# Patient Record
Sex: MALE | Race: WHITE | NOT HISPANIC OR LATINO | Employment: FULL TIME | URBAN - METROPOLITAN AREA
[De-identification: names, ages, dates, MRNs, and addresses within clinical notes are randomized per-mention and may not be internally consistent; named-entity substitution may affect disease eponyms.]

---

## 2017-06-02 ENCOUNTER — HOSPITAL ENCOUNTER (OUTPATIENT)
Dept: RADIOLOGY | Age: 42
Discharge: HOME | End: 2017-06-02
Payer: COMMERCIAL

## 2017-06-02 DIAGNOSIS — C73 THYROID CANCER (HCC): ICD-10-CM

## 2017-06-27 ENCOUNTER — APPOINTMENT (OUTPATIENT)
Dept: LAB | Facility: CLINIC | Age: 42
End: 2017-06-27
Payer: COMMERCIAL

## 2017-06-27 ENCOUNTER — TRANSCRIBE ORDERS (OUTPATIENT)
Dept: LAB | Facility: CLINIC | Age: 42
End: 2017-06-27

## 2017-06-27 DIAGNOSIS — C73 MALIGNANT NEOPLASM OF THYROID GLAND (HCC): ICD-10-CM

## 2017-06-27 DIAGNOSIS — E83.51 HYPOCALCEMIA: ICD-10-CM

## 2017-06-27 DIAGNOSIS — E89.0 POSTSURGICAL HYPOTHYROIDISM: ICD-10-CM

## 2017-06-27 DIAGNOSIS — E11.9 DIABETES MELLITUS WITHOUT COMPLICATION (HCC): ICD-10-CM

## 2017-06-27 DIAGNOSIS — Z79.899 ENCOUNTER FOR LONG-TERM (CURRENT) USE OF OTHER MEDICATIONS: ICD-10-CM

## 2017-06-27 DIAGNOSIS — Z79.4 ENCOUNTER FOR LONG-TERM (CURRENT) USE OF INSULIN (HCC): ICD-10-CM

## 2017-06-27 DIAGNOSIS — E66.09 EXOGENOUS OBESITY: ICD-10-CM

## 2017-06-27 DIAGNOSIS — E11.9 DIABETES MELLITUS WITHOUT COMPLICATION (HCC): Primary | ICD-10-CM

## 2017-06-27 LAB
ALBUMIN SERPL BCP-MCNC: 4 G/DL (ref 3.5–5)
ALP SERPL-CCNC: 58 U/L (ref 46–116)
ALT SERPL W P-5'-P-CCNC: 70 U/L (ref 12–78)
ANION GAP SERPL CALCULATED.3IONS-SCNC: 6 MMOL/L (ref 4–13)
AST SERPL W P-5'-P-CCNC: 33 U/L (ref 5–45)
BILIRUB SERPL-MCNC: 0.9 MG/DL (ref 0.2–1)
BUN SERPL-MCNC: 16 MG/DL (ref 5–25)
CALCIUM SERPL-MCNC: 8.7 MG/DL (ref 8.3–10.1)
CHLORIDE SERPL-SCNC: 102 MMOL/L (ref 100–108)
CO2 SERPL-SCNC: 31 MMOL/L (ref 21–32)
CREAT SERPL-MCNC: 1.08 MG/DL (ref 0.6–1.3)
EST. AVERAGE GLUCOSE BLD GHB EST-MCNC: 154 MG/DL
GFR SERPL CREATININE-BSD FRML MDRD: >60 ML/MIN/1.73SQ M
GLUCOSE P FAST SERPL-MCNC: 154 MG/DL (ref 65–99)
HBA1C MFR BLD: 7 % (ref 4.2–6.3)
POTASSIUM SERPL-SCNC: 3.9 MMOL/L (ref 3.5–5.3)
PROT SERPL-MCNC: 7.4 G/DL (ref 6.4–8.2)
SODIUM SERPL-SCNC: 139 MMOL/L (ref 136–145)
T4 FREE SERPL-MCNC: 0.3 NG/DL (ref 0.76–1.46)
TSH SERPL DL<=0.05 MIU/L-ACNC: 93.91 UIU/ML (ref 0.36–3.74)

## 2017-06-27 PROCEDURE — 80053 COMPREHEN METABOLIC PANEL: CPT

## 2017-06-27 PROCEDURE — 84443 ASSAY THYROID STIM HORMONE: CPT

## 2017-06-27 PROCEDURE — 36415 COLL VENOUS BLD VENIPUNCTURE: CPT

## 2017-06-27 PROCEDURE — 86800 THYROGLOBULIN ANTIBODY: CPT

## 2017-06-27 PROCEDURE — 84432 ASSAY OF THYROGLOBULIN: CPT

## 2017-06-27 PROCEDURE — 83036 HEMOGLOBIN GLYCOSYLATED A1C: CPT

## 2017-06-27 PROCEDURE — 84439 ASSAY OF FREE THYROXINE: CPT

## 2017-06-28 LAB
THYROGLOB AB SERPL-ACNC: <1 IU/ML (ref 0–0.9)
THYROGLOB AB SERPL-ACNC: <1 IU/ML (ref 0–0.9)
THYROGLOB SERPL-MCNC: 10.1 NG/ML (ref 1.4–29.2)

## 2017-06-29 ENCOUNTER — HOSPITAL ENCOUNTER (OUTPATIENT)
Dept: RADIOLOGY | Age: 42
Discharge: HOME | End: 2017-06-29
Payer: COMMERCIAL

## 2017-06-29 DIAGNOSIS — C73 MALIGNANT NEOPLASM OF THYROID GLAND (HCC): ICD-10-CM

## 2017-06-30 ENCOUNTER — HOSPITAL ENCOUNTER (OUTPATIENT)
Dept: RADIOLOGY | Age: 42
Discharge: HOME | End: 2017-06-30
Payer: COMMERCIAL

## 2017-06-30 ENCOUNTER — HOSPITAL ENCOUNTER (OUTPATIENT)
Dept: RADIOLOGY | Facility: HOSPITAL | Age: 42
Discharge: HOME | End: 2017-06-30
Payer: COMMERCIAL

## 2017-06-30 DIAGNOSIS — C73 MALIGNANT NEOPLASM OF THYROID GLAND (HCC): ICD-10-CM

## 2017-06-30 PROCEDURE — 78018 THYROID MET IMAGING BODY: CPT

## 2017-06-30 PROCEDURE — 79005 NUCLEAR RX ORAL ADMIN: CPT

## 2017-06-30 PROCEDURE — A9517 I131 IODIDE CAP, RX: HCPCS

## 2017-06-30 PROCEDURE — A9509 IODINE I-123 SOD IODIDE MIL: HCPCS

## 2017-07-05 ENCOUNTER — HOSPITAL ENCOUNTER (OUTPATIENT)
Dept: RADIOLOGY | Age: 42
Discharge: HOME | End: 2017-07-05
Payer: COMMERCIAL

## 2017-07-05 DIAGNOSIS — C73 MALIGNANT NEOPLASM OF THYROID GLAND (HCC): ICD-10-CM

## 2017-07-05 PROCEDURE — 78018 THYROID MET IMAGING BODY: CPT

## 2017-08-04 ENCOUNTER — TRANSCRIBE ORDERS (OUTPATIENT)
Dept: LAB | Facility: CLINIC | Age: 42
End: 2017-08-04

## 2017-08-04 ENCOUNTER — APPOINTMENT (OUTPATIENT)
Dept: LAB | Facility: CLINIC | Age: 42
End: 2017-08-04
Payer: COMMERCIAL

## 2017-08-04 DIAGNOSIS — E83.51 HYPOCALCEMIA: ICD-10-CM

## 2017-08-04 DIAGNOSIS — E66.09 EXOGENOUS OBESITY: ICD-10-CM

## 2017-08-04 DIAGNOSIS — Z79.899 ENCOUNTER FOR LONG-TERM (CURRENT) USE OF OTHER MEDICATIONS: ICD-10-CM

## 2017-08-04 DIAGNOSIS — Z79.4 ENCOUNTER FOR LONG-TERM (CURRENT) USE OF INSULIN (HCC): ICD-10-CM

## 2017-08-04 DIAGNOSIS — E11.9 DIABETES MELLITUS WITHOUT COMPLICATION (HCC): ICD-10-CM

## 2017-08-04 DIAGNOSIS — C73 MALIGNANT NEOPLASM OF THYROID GLAND (HCC): ICD-10-CM

## 2017-08-04 DIAGNOSIS — E89.0 POSTSURGICAL HYPOTHYROIDISM: ICD-10-CM

## 2017-08-04 DIAGNOSIS — C73 MALIGNANT NEOPLASM OF THYROID GLAND (HCC): Primary | ICD-10-CM

## 2017-08-04 LAB
ALBUMIN SERPL BCP-MCNC: 4 G/DL (ref 3.5–5)
ALP SERPL-CCNC: 69 U/L (ref 46–116)
ALT SERPL W P-5'-P-CCNC: 38 U/L (ref 12–78)
ANION GAP SERPL CALCULATED.3IONS-SCNC: 8 MMOL/L (ref 4–13)
AST SERPL W P-5'-P-CCNC: 16 U/L (ref 5–45)
BILIRUB SERPL-MCNC: 1.1 MG/DL (ref 0.2–1)
BUN SERPL-MCNC: 25 MG/DL (ref 5–25)
CALCIUM SERPL-MCNC: 8.6 MG/DL (ref 8.3–10.1)
CHLORIDE SERPL-SCNC: 102 MMOL/L (ref 100–108)
CHOLEST SERPL-MCNC: 152 MG/DL (ref 50–200)
CO2 SERPL-SCNC: 29 MMOL/L (ref 21–32)
CREAT SERPL-MCNC: 0.99 MG/DL (ref 0.6–1.3)
GFR SERPL CREATININE-BSD FRML MDRD: 93 ML/MIN/1.73SQ M
GLUCOSE P FAST SERPL-MCNC: 229 MG/DL (ref 65–99)
HDLC SERPL-MCNC: 31 MG/DL (ref 40–60)
LDLC SERPL CALC-MCNC: 100 MG/DL (ref 0–100)
POTASSIUM SERPL-SCNC: 4 MMOL/L (ref 3.5–5.3)
PROT SERPL-MCNC: 7.5 G/DL (ref 6.4–8.2)
SODIUM SERPL-SCNC: 139 MMOL/L (ref 136–145)
T4 FREE SERPL-MCNC: 1.39 NG/DL (ref 0.76–1.46)
TRIGL SERPL-MCNC: 103 MG/DL
TSH SERPL DL<=0.05 MIU/L-ACNC: 4.54 UIU/ML (ref 0.36–3.74)

## 2017-08-04 PROCEDURE — 84439 ASSAY OF FREE THYROXINE: CPT

## 2017-08-04 PROCEDURE — 80061 LIPID PANEL: CPT

## 2017-08-04 PROCEDURE — 80053 COMPREHEN METABOLIC PANEL: CPT

## 2017-08-04 PROCEDURE — 36415 COLL VENOUS BLD VENIPUNCTURE: CPT

## 2017-08-04 PROCEDURE — 84443 ASSAY THYROID STIM HORMONE: CPT

## 2017-11-14 ENCOUNTER — TRANSCRIBE ORDERS (OUTPATIENT)
Dept: LAB | Facility: CLINIC | Age: 42
End: 2017-11-14

## 2017-11-14 ENCOUNTER — APPOINTMENT (OUTPATIENT)
Dept: LAB | Facility: CLINIC | Age: 42
End: 2017-11-14
Payer: COMMERCIAL

## 2017-11-14 DIAGNOSIS — E78.00 PURE HYPERCHOLESTEROLEMIA: ICD-10-CM

## 2017-11-14 DIAGNOSIS — E66.09 EXOGENOUS OBESITY: ICD-10-CM

## 2017-11-14 DIAGNOSIS — E89.0 POSTSURGICAL HYPOTHYROIDISM: ICD-10-CM

## 2017-11-14 DIAGNOSIS — R03.0 ELEVATED BLOOD PRESSURE READING WITHOUT DIAGNOSIS OF HYPERTENSION: ICD-10-CM

## 2017-11-14 DIAGNOSIS — E11.00 UNCONTROLLED TYPE 2 DIABETES MELLITUS WITH HYPEROSMOLARITY WITHOUT COMA, UNSPECIFIED LONG TERM INSULIN USE STATUS: ICD-10-CM

## 2017-11-14 DIAGNOSIS — Z79.899 NEED FOR PROPHYLACTIC CHEMOTHERAPY: ICD-10-CM

## 2017-11-14 DIAGNOSIS — E78.00 PURE HYPERCHOLESTEROLEMIA: Primary | ICD-10-CM

## 2017-11-14 LAB
ALBUMIN SERPL BCP-MCNC: 3.9 G/DL (ref 3.5–5)
ALP SERPL-CCNC: 62 U/L (ref 46–116)
ALT SERPL W P-5'-P-CCNC: 39 U/L (ref 12–78)
ANION GAP SERPL CALCULATED.3IONS-SCNC: 8 MMOL/L (ref 4–13)
AST SERPL W P-5'-P-CCNC: 14 U/L (ref 5–45)
BASOPHILS # BLD AUTO: 0.05 THOUSANDS/ΜL (ref 0–0.1)
BASOPHILS NFR BLD AUTO: 1 % (ref 0–1)
BILIRUB SERPL-MCNC: 0.8 MG/DL (ref 0.2–1)
BUN SERPL-MCNC: 16 MG/DL (ref 5–25)
CALCIUM SERPL-MCNC: 8.9 MG/DL (ref 8.3–10.1)
CHLORIDE SERPL-SCNC: 103 MMOL/L (ref 100–108)
CO2 SERPL-SCNC: 29 MMOL/L (ref 21–32)
CREAT SERPL-MCNC: 0.99 MG/DL (ref 0.6–1.3)
EOSINOPHIL # BLD AUTO: 0.1 THOUSAND/ΜL (ref 0–0.61)
EOSINOPHIL NFR BLD AUTO: 2 % (ref 0–6)
ERYTHROCYTE [DISTWIDTH] IN BLOOD BY AUTOMATED COUNT: 13 % (ref 11.6–15.1)
EST. AVERAGE GLUCOSE BLD GHB EST-MCNC: 171 MG/DL
GFR SERPL CREATININE-BSD FRML MDRD: 93 ML/MIN/1.73SQ M
GLUCOSE P FAST SERPL-MCNC: 251 MG/DL (ref 65–99)
HBA1C MFR BLD: 7.6 % (ref 4.2–6.3)
HCT VFR BLD AUTO: 44.3 % (ref 36.5–49.3)
HGB BLD-MCNC: 15.9 G/DL (ref 12–17)
LYMPHOCYTES # BLD AUTO: 1.13 THOUSANDS/ΜL (ref 0.6–4.47)
LYMPHOCYTES NFR BLD AUTO: 28 % (ref 14–44)
MCH RBC QN AUTO: 30.3 PG (ref 26.8–34.3)
MCHC RBC AUTO-ENTMCNC: 35.9 G/DL (ref 31.4–37.4)
MCV RBC AUTO: 85 FL (ref 82–98)
MONOCYTES # BLD AUTO: 0.36 THOUSAND/ΜL (ref 0.17–1.22)
MONOCYTES NFR BLD AUTO: 9 % (ref 4–12)
NEUTROPHILS # BLD AUTO: 2.46 THOUSANDS/ΜL (ref 1.85–7.62)
NEUTS SEG NFR BLD AUTO: 60 % (ref 43–75)
PLATELET # BLD AUTO: 182 THOUSANDS/UL (ref 149–390)
PMV BLD AUTO: 9.5 FL (ref 8.9–12.7)
POTASSIUM SERPL-SCNC: 4.1 MMOL/L (ref 3.5–5.3)
PROT SERPL-MCNC: 7.4 G/DL (ref 6.4–8.2)
RBC # BLD AUTO: 5.24 MILLION/UL (ref 3.88–5.62)
SODIUM SERPL-SCNC: 140 MMOL/L (ref 136–145)
T4 FREE SERPL-MCNC: 0.8 NG/DL (ref 0.76–1.46)
TSH SERPL DL<=0.05 MIU/L-ACNC: 84.72 UIU/ML (ref 0.36–3.74)
WBC # BLD AUTO: 4.1 THOUSAND/UL (ref 4.31–10.16)

## 2017-11-14 PROCEDURE — 36415 COLL VENOUS BLD VENIPUNCTURE: CPT

## 2017-11-14 PROCEDURE — 86800 THYROGLOBULIN ANTIBODY: CPT

## 2017-11-14 PROCEDURE — 83036 HEMOGLOBIN GLYCOSYLATED A1C: CPT

## 2017-11-14 PROCEDURE — 80053 COMPREHEN METABOLIC PANEL: CPT

## 2017-11-14 PROCEDURE — 85025 COMPLETE CBC W/AUTO DIFF WBC: CPT

## 2017-11-14 PROCEDURE — 84443 ASSAY THYROID STIM HORMONE: CPT

## 2017-11-14 PROCEDURE — 84439 ASSAY OF FREE THYROXINE: CPT

## 2017-11-14 PROCEDURE — 84432 ASSAY OF THYROGLOBULIN: CPT

## 2017-11-15 LAB
THYROGLOB AB SERPL-ACNC: <1 IU/ML (ref 0–0.9)
THYROGLOB AB SERPL-ACNC: <1 IU/ML (ref 0–0.9)
THYROGLOB SERPL-MCNC: 0.3 NG/ML (ref 1.4–29.2)

## 2018-01-16 ENCOUNTER — TRANSCRIBE ORDERS (OUTPATIENT)
Dept: LAB | Facility: CLINIC | Age: 43
End: 2018-01-16

## 2018-01-16 ENCOUNTER — APPOINTMENT (OUTPATIENT)
Dept: LAB | Facility: CLINIC | Age: 43
End: 2018-01-16
Payer: COMMERCIAL

## 2018-01-16 DIAGNOSIS — I10 ESSENTIAL HYPERTENSION, MALIGNANT: ICD-10-CM

## 2018-01-16 DIAGNOSIS — E89.0 POSTSURGICAL HYPOTHYROIDISM: ICD-10-CM

## 2018-01-16 DIAGNOSIS — E78.2 MIXED HYPERLIPIDEMIA: ICD-10-CM

## 2018-01-16 DIAGNOSIS — Z79.899 NEED FOR PROPHYLACTIC CHEMOTHERAPY: ICD-10-CM

## 2018-01-16 DIAGNOSIS — E66.09 EXOGENOUS OBESITY: ICD-10-CM

## 2018-01-16 DIAGNOSIS — E11.00 UNCONTROLLED TYPE 2 DIABETES MELLITUS WITH HYPEROSMOLARITY WITHOUT COMA, UNSPECIFIED LONG TERM INSULIN USE STATUS: Primary | ICD-10-CM

## 2018-01-16 DIAGNOSIS — E11.00 UNCONTROLLED TYPE 2 DIABETES MELLITUS WITH HYPEROSMOLARITY WITHOUT COMA, UNSPECIFIED LONG TERM INSULIN USE STATUS: ICD-10-CM

## 2018-01-16 LAB
25(OH)D3 SERPL-MCNC: 8.6 NG/ML (ref 30–100)
ALBUMIN SERPL BCP-MCNC: 4 G/DL (ref 3.5–5)
ALP SERPL-CCNC: 68 U/L (ref 46–116)
ALT SERPL W P-5'-P-CCNC: 41 U/L (ref 12–78)
ANION GAP SERPL CALCULATED.3IONS-SCNC: 9 MMOL/L (ref 4–13)
AST SERPL W P-5'-P-CCNC: 15 U/L (ref 5–45)
BILIRUB SERPL-MCNC: 0.7 MG/DL (ref 0.2–1)
BUN SERPL-MCNC: 22 MG/DL (ref 5–25)
CALCIUM SERPL-MCNC: 8.5 MG/DL (ref 8.3–10.1)
CHLORIDE SERPL-SCNC: 103 MMOL/L (ref 100–108)
CO2 SERPL-SCNC: 27 MMOL/L (ref 21–32)
CREAT SERPL-MCNC: 0.91 MG/DL (ref 0.6–1.3)
EST. AVERAGE GLUCOSE BLD GHB EST-MCNC: 154 MG/DL
GFR SERPL CREATININE-BSD FRML MDRD: 103 ML/MIN/1.73SQ M
GLUCOSE P FAST SERPL-MCNC: 218 MG/DL (ref 65–99)
HBA1C MFR BLD: 7 % (ref 4.2–6.3)
POTASSIUM SERPL-SCNC: 3.9 MMOL/L (ref 3.5–5.3)
PROT SERPL-MCNC: 7.5 G/DL (ref 6.4–8.2)
SODIUM SERPL-SCNC: 139 MMOL/L (ref 136–145)
T4 FREE SERPL-MCNC: 1.16 NG/DL (ref 0.76–1.46)
TSH SERPL DL<=0.05 MIU/L-ACNC: 25.12 UIU/ML (ref 0.36–3.74)

## 2018-01-16 PROCEDURE — 83036 HEMOGLOBIN GLYCOSYLATED A1C: CPT

## 2018-01-16 PROCEDURE — 36415 COLL VENOUS BLD VENIPUNCTURE: CPT

## 2018-01-16 PROCEDURE — 84432 ASSAY OF THYROGLOBULIN: CPT

## 2018-01-16 PROCEDURE — 84439 ASSAY OF FREE THYROXINE: CPT

## 2018-01-16 PROCEDURE — 80053 COMPREHEN METABOLIC PANEL: CPT

## 2018-01-16 PROCEDURE — 84443 ASSAY THYROID STIM HORMONE: CPT

## 2018-01-16 PROCEDURE — 82306 VITAMIN D 25 HYDROXY: CPT

## 2018-01-16 PROCEDURE — 86800 THYROGLOBULIN ANTIBODY: CPT

## 2018-01-17 LAB
THYROGLOB AB SERPL-ACNC: <1 IU/ML (ref 0–0.9)
THYROGLOB SERPL-MCNC: 0.1 NG/ML (ref 1.4–29.2)

## 2018-03-26 ENCOUNTER — APPOINTMENT (OUTPATIENT)
Dept: LAB | Facility: CLINIC | Age: 43
End: 2018-03-26
Payer: COMMERCIAL

## 2018-03-26 ENCOUNTER — TRANSCRIBE ORDERS (OUTPATIENT)
Dept: LAB | Facility: CLINIC | Age: 43
End: 2018-03-26

## 2018-03-26 DIAGNOSIS — E55.9 VITAMIN D DEFICIENCY DISEASE: ICD-10-CM

## 2018-03-26 DIAGNOSIS — Z79.899 ENCOUNTER FOR LONG-TERM (CURRENT) USE OF MEDICATIONS: ICD-10-CM

## 2018-03-26 DIAGNOSIS — E11.65 UNCONTROLLED TYPE 2 DIABETES MELLITUS WITH COMPLICATION, UNSPECIFIED LONG TERM INSULIN USE STATUS: ICD-10-CM

## 2018-03-26 DIAGNOSIS — I10 HYPERTENSION, ESSENTIAL: ICD-10-CM

## 2018-03-26 DIAGNOSIS — Z79.4 ENCOUNTER FOR LONG-TERM (CURRENT) USE OF INSULIN (HCC): ICD-10-CM

## 2018-03-26 DIAGNOSIS — E66.09 EXOGENOUS OBESITY: ICD-10-CM

## 2018-03-26 DIAGNOSIS — E78.2 MIXED HYPERLIPIDEMIA: ICD-10-CM

## 2018-03-26 DIAGNOSIS — E89.0 POSTSURGICAL HYPOTHYROIDISM: ICD-10-CM

## 2018-03-26 DIAGNOSIS — E11.8 UNCONTROLLED TYPE 2 DIABETES MELLITUS WITH COMPLICATION, UNSPECIFIED LONG TERM INSULIN USE STATUS: ICD-10-CM

## 2018-03-26 DIAGNOSIS — E11.65 UNCONTROLLED TYPE 2 DIABETES MELLITUS WITH COMPLICATION, UNSPECIFIED LONG TERM INSULIN USE STATUS: Primary | ICD-10-CM

## 2018-03-26 DIAGNOSIS — E11.8 UNCONTROLLED TYPE 2 DIABETES MELLITUS WITH COMPLICATION, UNSPECIFIED LONG TERM INSULIN USE STATUS: Primary | ICD-10-CM

## 2018-03-26 LAB
25(OH)D3 SERPL-MCNC: 30 NG/ML (ref 30–100)
ALBUMIN SERPL BCP-MCNC: 4.1 G/DL (ref 3.5–5)
ALP SERPL-CCNC: 63 U/L (ref 46–116)
ALT SERPL W P-5'-P-CCNC: 41 U/L (ref 12–78)
ANION GAP SERPL CALCULATED.3IONS-SCNC: 9 MMOL/L (ref 4–13)
AST SERPL W P-5'-P-CCNC: 13 U/L (ref 5–45)
BILIRUB SERPL-MCNC: 0.8 MG/DL (ref 0.2–1)
BUN SERPL-MCNC: 20 MG/DL (ref 5–25)
CALCIUM SERPL-MCNC: 8.6 MG/DL (ref 8.3–10.1)
CHLORIDE SERPL-SCNC: 102 MMOL/L (ref 100–108)
CO2 SERPL-SCNC: 27 MMOL/L (ref 21–32)
CREAT SERPL-MCNC: 0.88 MG/DL (ref 0.6–1.3)
EST. AVERAGE GLUCOSE BLD GHB EST-MCNC: 197 MG/DL
GFR SERPL CREATININE-BSD FRML MDRD: 105 ML/MIN/1.73SQ M
GLUCOSE P FAST SERPL-MCNC: 314 MG/DL (ref 65–99)
HBA1C MFR BLD: 8.5 % (ref 4.2–6.3)
POTASSIUM SERPL-SCNC: 4.5 MMOL/L (ref 3.5–5.3)
PROT SERPL-MCNC: 7.4 G/DL (ref 6.4–8.2)
SODIUM SERPL-SCNC: 138 MMOL/L (ref 136–145)
T4 FREE SERPL-MCNC: 1.3 NG/DL (ref 0.76–1.46)
TSH SERPL DL<=0.05 MIU/L-ACNC: 3.45 UIU/ML (ref 0.36–3.74)

## 2018-03-26 PROCEDURE — 83036 HEMOGLOBIN GLYCOSYLATED A1C: CPT | Performed by: INTERNAL MEDICINE

## 2018-03-26 PROCEDURE — 82306 VITAMIN D 25 HYDROXY: CPT

## 2018-03-26 PROCEDURE — 84439 ASSAY OF FREE THYROXINE: CPT

## 2018-03-26 PROCEDURE — 84432 ASSAY OF THYROGLOBULIN: CPT

## 2018-03-26 PROCEDURE — 80053 COMPREHEN METABOLIC PANEL: CPT

## 2018-03-26 PROCEDURE — 84443 ASSAY THYROID STIM HORMONE: CPT

## 2018-03-26 PROCEDURE — 36415 COLL VENOUS BLD VENIPUNCTURE: CPT | Performed by: INTERNAL MEDICINE

## 2018-03-26 PROCEDURE — 86800 THYROGLOBULIN ANTIBODY: CPT

## 2018-03-27 LAB
THYROGLOB AB SERPL-ACNC: <1 IU/ML (ref 0–0.9)
THYROGLOB SERPL-MCNC: 0.1 NG/ML (ref 1.4–29.2)

## 2018-06-27 ENCOUNTER — TRANSCRIBE ORDERS (OUTPATIENT)
Dept: LAB | Facility: CLINIC | Age: 43
End: 2018-06-27

## 2018-06-27 ENCOUNTER — APPOINTMENT (OUTPATIENT)
Dept: LAB | Facility: CLINIC | Age: 43
End: 2018-06-27
Payer: COMMERCIAL

## 2018-06-27 DIAGNOSIS — C73 MALIGNANT NEOPLASM OF THYROID GLAND (HCC): ICD-10-CM

## 2018-06-27 DIAGNOSIS — Z79.899 HIGH RISK MEDICATION USE: ICD-10-CM

## 2018-06-27 DIAGNOSIS — IMO0002 TYPE II DIABETES MELLITUS WITH COMA, UNCONTROLLED: ICD-10-CM

## 2018-06-27 DIAGNOSIS — E66.09 EXOGENOUS OBESITY: ICD-10-CM

## 2018-06-27 DIAGNOSIS — E78.00 PURE HYPERCHOLESTEROLEMIA: ICD-10-CM

## 2018-06-27 DIAGNOSIS — Z79.4 ENCOUNTER FOR LONG-TERM (CURRENT) USE OF INSULIN (HCC): ICD-10-CM

## 2018-06-27 DIAGNOSIS — I10 HYPERTENSION, UNSPECIFIED TYPE: ICD-10-CM

## 2018-06-27 DIAGNOSIS — I10 HYPERTENSION, UNSPECIFIED TYPE: Primary | ICD-10-CM

## 2018-06-27 DIAGNOSIS — E55.9 VITAMIN D DEFICIENCY DISEASE: ICD-10-CM

## 2018-06-27 DIAGNOSIS — E89.0 POSTSURGICAL HYPOTHYROIDISM: ICD-10-CM

## 2018-06-27 LAB
25(OH)D3 SERPL-MCNC: 27.9 NG/ML (ref 30–100)
ALBUMIN SERPL BCP-MCNC: 3.9 G/DL (ref 3.5–5)
ALP SERPL-CCNC: 72 U/L (ref 46–116)
ALT SERPL W P-5'-P-CCNC: 35 U/L (ref 12–78)
ANION GAP SERPL CALCULATED.3IONS-SCNC: 8 MMOL/L (ref 4–13)
AST SERPL W P-5'-P-CCNC: 14 U/L (ref 5–45)
BILIRUB SERPL-MCNC: 1.1 MG/DL (ref 0.2–1)
BUN SERPL-MCNC: 19 MG/DL (ref 5–25)
CALCIUM SERPL-MCNC: 8.8 MG/DL (ref 8.3–10.1)
CHLORIDE SERPL-SCNC: 103 MMOL/L (ref 100–108)
CHOLEST SERPL-MCNC: 125 MG/DL (ref 50–200)
CO2 SERPL-SCNC: 28 MMOL/L (ref 21–32)
CREAT SERPL-MCNC: 0.86 MG/DL (ref 0.6–1.3)
EST. AVERAGE GLUCOSE BLD GHB EST-MCNC: 169 MG/DL
GFR SERPL CREATININE-BSD FRML MDRD: 106 ML/MIN/1.73SQ M
GLUCOSE P FAST SERPL-MCNC: 149 MG/DL (ref 65–99)
HBA1C MFR BLD: 7.5 % (ref 4.2–6.3)
HDLC SERPL-MCNC: 34 MG/DL (ref 40–60)
LDLC SERPL CALC-MCNC: 75 MG/DL (ref 0–100)
NONHDLC SERPL-MCNC: 91 MG/DL
POTASSIUM SERPL-SCNC: 3.6 MMOL/L (ref 3.5–5.3)
PROT SERPL-MCNC: 7.3 G/DL (ref 6.4–8.2)
SODIUM SERPL-SCNC: 139 MMOL/L (ref 136–145)
T4 FREE SERPL-MCNC: 1.47 NG/DL (ref 0.76–1.46)
TRIGL SERPL-MCNC: 80 MG/DL
TSH SERPL DL<=0.05 MIU/L-ACNC: 1.56 UIU/ML (ref 0.36–3.74)

## 2018-06-27 PROCEDURE — 82306 VITAMIN D 25 HYDROXY: CPT

## 2018-06-27 PROCEDURE — 84443 ASSAY THYROID STIM HORMONE: CPT

## 2018-06-27 PROCEDURE — 83036 HEMOGLOBIN GLYCOSYLATED A1C: CPT | Performed by: INTERNAL MEDICINE

## 2018-06-27 PROCEDURE — 80053 COMPREHEN METABOLIC PANEL: CPT

## 2018-06-27 PROCEDURE — 80061 LIPID PANEL: CPT

## 2018-06-27 PROCEDURE — 84439 ASSAY OF FREE THYROXINE: CPT

## 2018-06-27 PROCEDURE — 36415 COLL VENOUS BLD VENIPUNCTURE: CPT | Performed by: INTERNAL MEDICINE

## 2018-11-05 ENCOUNTER — HOSPITAL ENCOUNTER (EMERGENCY)
Facility: HOSPITAL | Age: 43
Discharge: HOME | End: 2018-11-05
Attending: EMERGENCY MEDICINE
Payer: OTHER MISCELLANEOUS

## 2018-11-05 VITALS
RESPIRATION RATE: 18 BRPM | HEART RATE: 84 BPM | OXYGEN SATURATION: 97 % | SYSTOLIC BLOOD PRESSURE: 123 MMHG | WEIGHT: 208 LBS | DIASTOLIC BLOOD PRESSURE: 81 MMHG | TEMPERATURE: 98.1 F

## 2018-11-05 DIAGNOSIS — H10.211 CHEMICAL CONJUNCTIVITIS OF RIGHT EYE: Primary | ICD-10-CM

## 2018-11-05 PROCEDURE — 99283 EMERGENCY DEPT VISIT LOW MDM: CPT

## 2018-11-05 NOTE — DISCHARGE INSTRUCTIONS
Conjunctivitis   WHAT YOU NEED TO KNOW:   Conjunctivitis, or pink eye, is inflammation of your conjunctiva  The conjunctiva is a thin tissue that covers the front of your eye and the back of your eyelids  The conjunctiva helps protect your eye and keep it moist  Conjunctivitis may be caused by bacteria, allergies, or a virus  If your conjunctivitis is caused by bacteria, it may get better on its own in about 7 days  Viral conjunctivitis can last up to 3 weeks  DISCHARGE INSTRUCTIONS:   Return to the emergency department if:   · You have worsening eye pain  · The swelling in your eye gets worse, even after treatment  · Your vision suddenly becomes worse or you cannot see at all  Contact your healthcare provider if:   · You develop a fever and ear pain  · You have tiny bumps or spots of blood on your eye  · You have questions or concerns about your condition or care  Manage your symptoms:   · Apply a cool compress  Wet a washcloth with cold water and place it on your eye  This will help decrease itching and irritation  · Do not wear contact lenses  They can irritate your eye  Throw away the pair you are using and ask when you can wear them again  Use a new pair of lenses when your healthcare provider says it is okay  · Avoid irritants  Stay away from smoke filled areas  Shield your eyes from wind and sun  · Flush your eye  You may need to flush your eye with saline to help decrease your symptoms  Ask for more information on how to flush your eye  Medicines:  Treatment depends on what is causing your conjunctivitis  You may be given any of the following:  · Allergy medicine  helps decrease itchy, red, swollen eyes caused by allergies  It may be given as a pill, eye drops, or nasal spray  · Antibiotics  may be needed if your conjunctivitis is caused by bacteria  This medicine may be given as a pill, eye drops, or eye ointment  · Take your medicine as directed    Contact your healthcare provider if you think your medicine is not helping or if you have side effects  Tell him or her if you are allergic to any medicine  Keep a list of the medicines, vitamins, and herbs you take  Include the amounts, and when and why you take them  Bring the list or the pill bottles to follow-up visits  Carry your medicine list with you in case of an emergency  Prevent the spread of conjunctivitis:   · Wash your hands with soap and water often  Wash your hands before and after you touch your eyes  Also wash your hands before you prepare or eat food and after you use the bathroom or change a diaper  · Avoid allergens  Try to avoid the things that cause your allergies, such as pets, dust, or grass  · Avoid contact with others  Do not share towels or washcloths  Try to stay away from others as much as possible  Ask when you can return to work or school  · Throw away eye makeup  The bacteria that caused your conjunctivitis can stay in eye makeup  Throw away mascara and other eye makeup  © 2017 [employer]  Information is for End User's use only and may not be sold, redistributed or otherwise used for commercial purposes  All illustrations and images included in CareNotes® are the copyrighted property of A D A M [employer] Inc  or [employer]  The above information is an  only  It is not intended as medical advice for individual conditions or treatments  Talk to your doctor, nurse or pharmacist before following any medical regimen to see if it is safe and effective for you

## 2018-11-05 NOTE — ED PROVIDER NOTES
History  Chief Complaint   Patient presents with    Foreign Body in Eye     paint thinner slpashed in right eye while at work this mornint  pt states "it is starting to feel irritated" denies blurry vision  Patient is a 28-year-old male with past medical history of thyroid cancer status post resection, who presents to the emergency department for splash of paint thinner into the right eye that occurred while at work today  Patient states that he was wearing protective eyewear, however some of the pain thinner went underneath the protective eyewear, and entered the right eye  He immediately irrigated it with the eye flash while at work  Currently complains of some eye irritation and redness  He denies any loss of vision, blurred vision, double vision, current coming over the eyes, flashing lights  Denies any foreign body sensation  Denies any abnormal discharge  Denies fevers or chills  Patient does were glasses  Does not wear contacts  History provided by:  Patient   used: No        None       Past Medical History:   Diagnosis Date    Cancer [facility])     Thyroid cancer       Past Surgical History:   Procedure Laterality Date    NECK SURGERY  04/21/2017       Family History   Problem Relation Age of Onset    Diabetes type II Mother         MELLITUS    COPD Father     Cancer Father         MB 2/4/16    LYMPHOMA CANCER     I have reviewed and agree with the history as documented  Social History   Substance Use Topics    Smoking status: Never Smoker    Smokeless tobacco: Never Used    Alcohol use Not on file        Review of Systems   Constitutional: Negative for chills and fever  HENT: Negative for congestion, ear pain, postnasal drip, rhinorrhea, sinus pain, sinus pressure, sore throat and trouble swallowing  Eyes: Positive for pain and redness  Negative for photophobia, discharge, itching and visual disturbance     Respiratory: Negative for cough, chest tightness, shortness of breath and wheezing  Cardiovascular: Negative for chest pain, palpitations and leg swelling  Gastrointestinal: Negative for abdominal pain, anal bleeding, constipation, diarrhea, nausea and vomiting  Genitourinary: Negative for dysuria, flank pain, frequency, hematuria and urgency  Musculoskeletal: Negative for arthralgias, back pain, gait problem, joint swelling, myalgias, neck pain and neck stiffness  Skin: Negative for color change, pallor, rash and wound  Neurological: Negative for dizziness, syncope, weakness, light-headedness, numbness and headaches  Psychiatric/Behavioral: Negative  Physical Exam  Physical Exam   Constitutional: He is oriented to person, place, and time  He appears well-developed and well-nourished  No distress  HENT:   Head: Normocephalic and atraumatic  Mouth/Throat: Oropharynx is clear and moist    Eyes: Pupils are equal, round, and reactive to light  EOM are normal  Right conjunctiva is injected  Right conjunctiva has no hemorrhage  Left conjunctiva is not injected  Left conjunctiva has no hemorrhage  Right eye visual acuity is 20/20  Left eye visual acuity is 20/20  Bilateral eye visual acuity is 20/20  Peripheral exam is intact to bilateral eyes  There is no fluorescein uptake on Wood's lamp exam   No foreign body identified to the right eye  PH to bilateral eyes is 6 5  Neck: Normal range of motion  Neck supple  Cardiovascular: Normal rate, regular rhythm and intact distal pulses  Pulmonary/Chest: Effort normal  No respiratory distress  Abdominal: Soft  Musculoskeletal: Normal range of motion  He exhibits no edema or tenderness  Neurological: He is alert and oriented to person, place, and time  Skin: Skin is warm and dry  Capillary refill takes less than 2 seconds  He is not diaphoretic  Psychiatric: He has a normal mood and affect  His behavior is normal    Nursing note and vitals reviewed        Vital Signs  ED Triage Vitals   Temperature Pulse Respirations Blood Pressure SpO2   11/05/18 0805 11/05/18 0805 11/05/18 0805 11/05/18 0807 11/05/18 0805   98 1 °F (36 7 °C) 84 18 123/81 97 %      Temp Source Heart Rate Source Patient Position - Orthostatic VS BP Location FiO2 (%)   11/05/18 0805 11/05/18 0805 -- -- --   Oral Monitor         Pain Score       --                  Vitals:    11/05/18 0805 11/05/18 0807   BP:  123/81   Pulse: 84        Visual Acuity      ED Medications  Medications - No data to display    Diagnostic Studies  Results Reviewed     None                 No orders to display              Procedures  Procedures       Phone Contacts  ED Phone Contact    ED Course                               MDM  Number of Diagnoses or Management Options  Chemical conjunctivitis of right eye:   Diagnosis management comments: Differential Diagnosis includes but is not limited to:  Chemical conjunctivitis, corneal ulcer or abrasion  Patient likely has eye irritation secondary to chemical conjunctivitis  Patient did irrigate the eyes prior to arrival in the emergency department  Patient instructed to continue to use eye drops as needed for irritation  Discharge home with primary care in optometry follow-up as needed  CritCare Time    Disposition  Final diagnoses:   Chemical conjunctivitis of right eye     Time reflects when diagnosis was documented in both MDM as applicable and the Disposition within this note     Time User Action Codes Description Comment    11/5/2018  9:17 AM Guerrero Staton Add [H10 211] Chemical conjunctivitis of right eye       ED Disposition     ED Disposition Condition Comment    Discharge  Jeannette Vincent discharge to home/self care      Condition at discharge: Good        Follow-up Information     Follow up With Specialties Details Why [place] [facility] In 1 week As needed, If symptoms worsen [place] [phone]  [place] [address] [facility] There are no discharge medications for this patient  No discharge procedures on file      ED Provider  Electronically Signed by           Eleno Kennedy PA-C  11/05/18 7756

## 2019-02-16 ENCOUNTER — TRANSCRIBE ORDERS (OUTPATIENT)
Dept: LAB | Facility: CLINIC | Age: 44
End: 2019-02-16

## 2019-02-16 ENCOUNTER — APPOINTMENT (OUTPATIENT)
Dept: LAB | Facility: CLINIC | Age: 44
End: 2019-02-16
Payer: COMMERCIAL

## 2019-02-16 DIAGNOSIS — C73 MALIGNANT NEOPLASM OF THYROID GLAND (HCC): ICD-10-CM

## 2019-02-16 DIAGNOSIS — E11.649 UNCONTROLLED TYPE 2 DIABETES MELLITUS WITH HYPOGLYCEMIA, UNSPECIFIED HYPOGLYCEMIA COMA STATUS (HCC): ICD-10-CM

## 2019-02-16 DIAGNOSIS — E66.8 OBESITY OF ENDOCRINE ORIGIN: ICD-10-CM

## 2019-02-16 DIAGNOSIS — E89.0 POSTSURGICAL HYPOTHYROIDISM: Primary | ICD-10-CM

## 2019-02-16 DIAGNOSIS — E89.0 POSTSURGICAL HYPOTHYROIDISM: ICD-10-CM

## 2019-02-16 LAB
ALBUMIN SERPL BCP-MCNC: 3.9 G/DL (ref 3.5–5)
ALP SERPL-CCNC: 68 U/L (ref 46–116)
ALT SERPL W P-5'-P-CCNC: 31 U/L (ref 12–78)
ANION GAP SERPL CALCULATED.3IONS-SCNC: 10 MMOL/L (ref 4–13)
AST SERPL W P-5'-P-CCNC: 11 U/L (ref 5–45)
BASOPHILS # BLD AUTO: 0.05 THOUSANDS/ΜL (ref 0–0.1)
BASOPHILS NFR BLD AUTO: 1 % (ref 0–1)
BILIRUB SERPL-MCNC: 1 MG/DL (ref 0.2–1)
BUN SERPL-MCNC: 19 MG/DL (ref 5–25)
CALCIUM SERPL-MCNC: 8.6 MG/DL (ref 8.3–10.1)
CHLORIDE SERPL-SCNC: 103 MMOL/L (ref 100–108)
CHOLEST SERPL-MCNC: 148 MG/DL (ref 50–200)
CO2 SERPL-SCNC: 25 MMOL/L (ref 21–32)
CREAT SERPL-MCNC: 0.82 MG/DL (ref 0.6–1.3)
CREAT UR-MCNC: 172 MG/DL
EOSINOPHIL # BLD AUTO: 0.08 THOUSAND/ΜL (ref 0–0.61)
EOSINOPHIL NFR BLD AUTO: 2 % (ref 0–6)
ERYTHROCYTE [DISTWIDTH] IN BLOOD BY AUTOMATED COUNT: 12.6 % (ref 11.6–15.1)
EST. AVERAGE GLUCOSE BLD GHB EST-MCNC: 212 MG/DL
FERRITIN SERPL-MCNC: 314 NG/ML (ref 8–388)
FSH SERPL-ACNC: 8.5 MIU/ML (ref 0.7–10.8)
GFR SERPL CREATININE-BSD FRML MDRD: 108 ML/MIN/1.73SQ M
GLUCOSE P FAST SERPL-MCNC: 232 MG/DL (ref 65–99)
HBA1C MFR BLD: 9 % (ref 4.2–6.3)
HCT VFR BLD AUTO: 49.2 % (ref 36.5–49.3)
HDLC SERPL-MCNC: 34 MG/DL (ref 40–60)
HGB BLD-MCNC: 17.3 G/DL (ref 12–17)
IMM GRANULOCYTES # BLD AUTO: 0.03 THOUSAND/UL (ref 0–0.2)
IMM GRANULOCYTES NFR BLD AUTO: 1 % (ref 0–2)
IRON SERPL-MCNC: 91 UG/DL (ref 65–175)
LDLC SERPL CALC-MCNC: 95 MG/DL (ref 0–100)
LH SERPL-ACNC: 5.8 MIU/ML (ref 1.2–10.6)
LYMPHOCYTES # BLD AUTO: 0.83 THOUSANDS/ΜL (ref 0.6–4.47)
LYMPHOCYTES NFR BLD AUTO: 20 % (ref 14–44)
MAGNESIUM SERPL-MCNC: 1.8 MG/DL (ref 1.6–2.6)
MCH RBC QN AUTO: 29.3 PG (ref 26.8–34.3)
MCHC RBC AUTO-ENTMCNC: 35.2 G/DL (ref 31.4–37.4)
MCV RBC AUTO: 83 FL (ref 82–98)
MICROALBUMIN UR-MCNC: 10.8 MG/L (ref 0–20)
MICROALBUMIN/CREAT 24H UR: 6 MG/G CREATININE (ref 0–30)
MONOCYTES # BLD AUTO: 0.29 THOUSAND/ΜL (ref 0.17–1.22)
MONOCYTES NFR BLD AUTO: 7 % (ref 4–12)
NEUTROPHILS # BLD AUTO: 2.86 THOUSANDS/ΜL (ref 1.85–7.62)
NEUTS SEG NFR BLD AUTO: 69 % (ref 43–75)
NONHDLC SERPL-MCNC: 114 MG/DL
NRBC BLD AUTO-RTO: 0 /100 WBCS
PHOSPHATE SERPL-MCNC: 3.3 MG/DL (ref 2.7–4.5)
PLATELET # BLD AUTO: 170 THOUSANDS/UL (ref 149–390)
PMV BLD AUTO: 9.8 FL (ref 8.9–12.7)
POTASSIUM SERPL-SCNC: 3.9 MMOL/L (ref 3.5–5.3)
PROLACTIN SERPL-MCNC: 7.6 NG/ML (ref 2.5–17.4)
PROT SERPL-MCNC: 7.2 G/DL (ref 6.4–8.2)
PSA SERPL-MCNC: 0.6 NG/ML (ref 0–4)
RBC # BLD AUTO: 5.91 MILLION/UL (ref 3.88–5.62)
SODIUM SERPL-SCNC: 138 MMOL/L (ref 136–145)
T4 FREE SERPL-MCNC: 1.51 NG/DL (ref 0.76–1.46)
TESTOST SERPL-MCNC: 464 NG/DL (ref 113–1065)
TIBC SERPL-MCNC: 356 UG/DL (ref 250–450)
TRIGL SERPL-MCNC: 93 MG/DL
TSH SERPL DL<=0.05 MIU/L-ACNC: 0.7 UIU/ML (ref 0.36–3.74)
WBC # BLD AUTO: 4.14 THOUSAND/UL (ref 4.31–10.16)

## 2019-02-16 PROCEDURE — 83540 ASSAY OF IRON: CPT

## 2019-02-16 PROCEDURE — 84100 ASSAY OF PHOSPHORUS: CPT

## 2019-02-16 PROCEDURE — 36415 COLL VENOUS BLD VENIPUNCTURE: CPT

## 2019-02-16 PROCEDURE — 84443 ASSAY THYROID STIM HORMONE: CPT

## 2019-02-16 PROCEDURE — 84439 ASSAY OF FREE THYROXINE: CPT

## 2019-02-16 PROCEDURE — 80061 LIPID PANEL: CPT

## 2019-02-16 PROCEDURE — 86800 THYROGLOBULIN ANTIBODY: CPT

## 2019-02-16 PROCEDURE — 84432 ASSAY OF THYROGLOBULIN: CPT

## 2019-02-16 PROCEDURE — 83519 RIA NONANTIBODY: CPT

## 2019-02-16 PROCEDURE — 83036 HEMOGLOBIN GLYCOSYLATED A1C: CPT

## 2019-02-16 PROCEDURE — 80053 COMPREHEN METABOLIC PANEL: CPT

## 2019-02-16 PROCEDURE — 82728 ASSAY OF FERRITIN: CPT

## 2019-02-16 PROCEDURE — 82570 ASSAY OF URINE CREATININE: CPT | Performed by: SPECIALIST

## 2019-02-16 PROCEDURE — G0103 PSA SCREENING: HCPCS

## 2019-02-16 PROCEDURE — 83735 ASSAY OF MAGNESIUM: CPT

## 2019-02-16 PROCEDURE — 85025 COMPLETE CBC W/AUTO DIFF WBC: CPT

## 2019-02-16 PROCEDURE — 84146 ASSAY OF PROLACTIN: CPT

## 2019-02-16 PROCEDURE — 83550 IRON BINDING TEST: CPT

## 2019-02-16 PROCEDURE — 84206 ASSAY OF PROINSULIN: CPT

## 2019-02-16 PROCEDURE — 84681 ASSAY OF C-PEPTIDE: CPT

## 2019-02-16 PROCEDURE — 82043 UR ALBUMIN QUANTITATIVE: CPT | Performed by: SPECIALIST

## 2019-02-16 PROCEDURE — 83001 ASSAY OF GONADOTROPIN (FSH): CPT

## 2019-02-16 PROCEDURE — 83002 ASSAY OF GONADOTROPIN (LH): CPT

## 2019-02-16 PROCEDURE — 84403 ASSAY OF TOTAL TESTOSTERONE: CPT

## 2019-02-17 LAB — C PEPTIDE SERPL-MCNC: 3.6 NG/ML (ref 1.1–4.4)

## 2019-02-18 LAB — PROINSULIN SERPL-SCNC: 28.9 PMOL/L (ref 0–10)

## 2019-02-19 LAB
GAD65 AB SER-ACNC: <5 U/ML (ref 0–5)
THYROGLOB AB SERPL-ACNC: <1 IU/ML (ref 0–0.9)
THYROGLOB SERPL-MCNC: <0.1 NG/ML (ref 1.4–29.2)

## 2019-04-15 ENCOUNTER — OFFICE VISIT (OUTPATIENT)
Dept: FAMILY MEDICINE CLINIC | Facility: CLINIC | Age: 44
End: 2019-04-15
Payer: COMMERCIAL

## 2019-04-15 VITALS
SYSTOLIC BLOOD PRESSURE: 128 MMHG | HEIGHT: 68 IN | BODY MASS INDEX: 30.68 KG/M2 | DIASTOLIC BLOOD PRESSURE: 80 MMHG | TEMPERATURE: 97.3 F | WEIGHT: 202.4 LBS | HEART RATE: 94 BPM | RESPIRATION RATE: 16 BRPM | OXYGEN SATURATION: 98 %

## 2019-04-15 DIAGNOSIS — J06.9 ACUTE UPPER RESPIRATORY INFECTION: Primary | ICD-10-CM

## 2019-04-15 DIAGNOSIS — R10.9 ABDOMINAL PAIN, UNSPECIFIED ABDOMINAL LOCATION: ICD-10-CM

## 2019-04-15 PROBLEM — E11.9 DIABETES (HCC): Status: ACTIVE | Noted: 2019-04-15

## 2019-04-15 PROBLEM — E89.0 POSTOPERATIVE HYPOTHYROIDISM: Status: ACTIVE | Noted: 2019-04-15

## 2019-04-15 PROBLEM — I10 ESSENTIAL HYPERTENSION: Status: ACTIVE | Noted: 2019-04-15

## 2019-04-15 PROCEDURE — 99213 OFFICE O/P EST LOW 20 MIN: CPT | Performed by: PHYSICIAN ASSISTANT

## 2019-04-15 PROCEDURE — 1036F TOBACCO NON-USER: CPT | Performed by: PHYSICIAN ASSISTANT

## 2019-04-15 PROCEDURE — 3008F BODY MASS INDEX DOCD: CPT | Performed by: PHYSICIAN ASSISTANT

## 2019-04-15 RX ORDER — BENAZEPRIL HYDROCHLORIDE 40 MG/1
TABLET, FILM COATED ORAL
COMMUNITY
Start: 2019-04-04 | End: 2022-07-28 | Stop reason: SDUPTHER

## 2019-04-15 RX ORDER — AMLODIPINE AND OLMESARTAN MEDOXOMIL 5; 40 MG/1; MG/1
TABLET ORAL
COMMUNITY
End: 2019-04-15

## 2019-04-15 RX ORDER — AMLODIPINE BESYLATE 5 MG/1
TABLET ORAL
COMMUNITY
Start: 2019-04-04 | End: 2022-07-28 | Stop reason: SDUPTHER

## 2019-04-15 RX ORDER — ATORVASTATIN CALCIUM 20 MG/1
20 TABLET, FILM COATED ORAL
Refills: 5 | COMMUNITY
Start: 2019-03-20 | End: 2022-07-28 | Stop reason: SDUPTHER

## 2019-04-15 RX ORDER — PEN NEEDLE, DIABETIC 30 GX3/16"
NEEDLE, DISPOSABLE MISCELLANEOUS
COMMUNITY
Start: 2017-04-26

## 2019-04-15 RX ORDER — ATORVASTATIN CALCIUM 20 MG/1
TABLET, FILM COATED ORAL EVERY 24 HOURS
COMMUNITY
Start: 2017-10-30 | End: 2019-04-15

## 2019-04-15 RX ORDER — FENOPROFEN CALCIUM 200 MG
CAPSULE ORAL
Refills: 3 | COMMUNITY
Start: 2019-03-22

## 2019-04-15 RX ORDER — LEVOTHYROXINE SODIUM 0.2 MG/1
200 TABLET ORAL DAILY
Refills: 6 | COMMUNITY
Start: 2019-03-20

## 2019-05-08 ENCOUNTER — OFFICE VISIT (OUTPATIENT)
Dept: URGENT CARE | Age: 44
End: 2019-05-08
Payer: COMMERCIAL

## 2019-05-08 VITALS
HEIGHT: 68 IN | WEIGHT: 200 LBS | OXYGEN SATURATION: 96 % | DIASTOLIC BLOOD PRESSURE: 75 MMHG | BODY MASS INDEX: 30.31 KG/M2 | TEMPERATURE: 97.3 F | RESPIRATION RATE: 16 BRPM | SYSTOLIC BLOOD PRESSURE: 119 MMHG | HEART RATE: 109 BPM

## 2019-05-08 DIAGNOSIS — J01.10 ACUTE NON-RECURRENT FRONTAL SINUSITIS: Primary | ICD-10-CM

## 2019-05-08 DIAGNOSIS — R53.83 FATIGUE, UNSPECIFIED TYPE: ICD-10-CM

## 2019-05-08 LAB
GLUCOSE SERPL-MCNC: 196 MG/DL (ref 65–140)
SL AMB POCT GLUCOSE BLD: 196

## 2019-05-08 PROCEDURE — 99213 OFFICE O/P EST LOW 20 MIN: CPT | Performed by: NURSE PRACTITIONER

## 2019-05-08 PROCEDURE — 82948 REAGENT STRIP/BLOOD GLUCOSE: CPT | Performed by: NURSE PRACTITIONER

## 2019-05-08 RX ORDER — AMOXICILLIN AND CLAVULANATE POTASSIUM 875; 125 MG/1; MG/1
1 TABLET, FILM COATED ORAL EVERY 12 HOURS SCHEDULED
Qty: 14 TABLET | Refills: 0 | Status: SHIPPED | OUTPATIENT
Start: 2019-05-08 | End: 2019-05-15

## 2019-06-12 ENCOUNTER — TELEPHONE (OUTPATIENT)
Dept: FAMILY MEDICINE CLINIC | Facility: CLINIC | Age: 44
End: 2019-06-12

## 2019-09-13 ENCOUNTER — TELEPHONE (OUTPATIENT)
Dept: FAMILY MEDICINE CLINIC | Facility: CLINIC | Age: 44
End: 2019-09-13

## 2019-10-07 ENCOUNTER — TELEPHONE (OUTPATIENT)
Dept: FAMILY MEDICINE CLINIC | Facility: CLINIC | Age: 44
End: 2019-10-07

## 2019-10-07 NOTE — TELEPHONE ENCOUNTER
PC from pt, states there was a chemical released at work today & he would like to be checked  Dick Carrera states he is not having any symptoms "just wants to get checked"  Raj Kilpatrick to check with his supervisor to see if they have a workmens comp Dr that he should see  Dick Carrera agreed

## 2019-12-03 ENCOUNTER — OFFICE VISIT (OUTPATIENT)
Dept: URGENT CARE | Age: 44
End: 2019-12-03
Payer: COMMERCIAL

## 2019-12-03 ENCOUNTER — APPOINTMENT (OUTPATIENT)
Dept: RADIOLOGY | Age: 44
End: 2019-12-03
Payer: COMMERCIAL

## 2019-12-03 VITALS
WEIGHT: 204.8 LBS | OXYGEN SATURATION: 96 % | HEIGHT: 68 IN | HEART RATE: 94 BPM | SYSTOLIC BLOOD PRESSURE: 149 MMHG | DIASTOLIC BLOOD PRESSURE: 85 MMHG | TEMPERATURE: 96.6 F | RESPIRATION RATE: 19 BRPM | BODY MASS INDEX: 31.04 KG/M2

## 2019-12-03 DIAGNOSIS — M54.6 ACUTE RIGHT-SIDED THORACIC BACK PAIN: Primary | ICD-10-CM

## 2019-12-03 DIAGNOSIS — R10.11 RUQ PAIN: ICD-10-CM

## 2019-12-03 PROCEDURE — 71046 X-RAY EXAM CHEST 2 VIEWS: CPT

## 2019-12-03 PROCEDURE — 99213 OFFICE O/P EST LOW 20 MIN: CPT | Performed by: PHYSICIAN ASSISTANT

## 2019-12-03 RX ORDER — CLOBETASOL PROPIONATE 0.5 MG/G
CREAM TOPICAL
Refills: 0 | COMMUNITY
Start: 2019-11-13

## 2019-12-03 NOTE — PATIENT INSTRUCTIONS
Continue to monitor symptoms  If new or worsening symptoms occur, go immediately to the ER  Follow up with family doctor this week  Take over-the-counter pain medications in use warm heat to relieve symptoms  Back Pain   WHAT YOU NEED TO KNOW:   Back pain is common  It can be caused by many conditions, such as arthritis or the breakdown of spinal discs  Your risk for back pain is increased by injuries, lack of activity, or repeated bending and twisting  You may feel sore or stiff on one or both sides of your back  The pain may spread to your buttocks or thighs  DISCHARGE INSTRUCTIONS:   Medicines:   · NSAIDs  help decrease swelling and pain  This medicine is available with or without a doctor's order  NSAIDs can cause stomach bleeding or kidney problems in certain people  If you take blood thinner medicine, always ask your healthcare provider if NSAIDs are safe for you  Always read the medicine label and follow directions  · Acetaminophen  decreases pain  It is available without a doctor's order  Ask how much to take and how often to take it  Follow directions  Acetaminophen can cause liver damage if not taken correctly  · Prescription pain medicine  may be given  Ask your healthcare provider how to take this medicine safely  · Take your medicine as directed  Contact your healthcare provider if you think your medicine is not helping or if you have side effects  Tell him or her if you are allergic to any medicine  Keep a list of the medicines, vitamins, and herbs you take  Include the amounts, and when and why you take them  Bring the list or the pill bottles to follow-up visits  Carry your medicine list with you in case of an emergency  Follow up with your healthcare provider in 2 weeks, or as directed:  Write down your questions so you remember to ask them during your visits  How to manage your back pain:   · Apply ice  on your back or affected area for 15 to 20 minutes every hour or as directed  Use an ice pack, or put crushed ice in a plastic bag  Cover it with a towel  Ice helps prevent tissue damage and decreases pain  · Apply heat  on your back or affected area for 20 to 30 minutes every 2 hours for as many days as directed  Heat helps decrease pain and muscle spasms  · Stay active  as much as you can without causing more pain  Bed rest could make your back pain worse  Avoid heavy lifting until your pain is gone  Return to the emergency department if:   · You have pain, numbness, or weakness in one or both legs  · Your pain becomes so severe that you cannot walk  · You cannot control your urine or bowel movements  · You have severe back pain with chest pain  · You have severe back pain, nausea, and vomiting  · You have severe back pain that spreads to your side or genital area  Contact your healthcare provider if:   · You have back pain that does not get better with rest and pain medicine  · You have a fever  · You have pain that worsens when you are on your back or when you rest     · You have pain that worsens when you cough or sneeze  · You lose weight without trying  · You have questions or concerns about your condition or care  © 2017 [employer]  Information is for End User's use only and may not be sold, redistributed or otherwise used for commercial purposes  All illustrations and images included in CareNotes® are the copyrighted property of [employer]  or [employer]  The above information is an  only  It is not intended as medical advice for individual conditions or treatments  Talk to your doctor, nurse or pharmacist before following any medical regimen to see if it is safe and effective for you

## 2019-12-03 NOTE — LETTER
December 3, 2019     Patient: Alee Chowdhury   YOB: 1975   Date of Visit: 12/3/2019       To Whom It May Concern: It is my medical opinion that Ji Herrera may return to work on 12/4/2019  If you have any questions or concerns, please don't hesitate to call           Sincerely,        Jacklyn Boyer PA-C    CC: No Recipients

## 2019-12-03 NOTE — PROGRESS NOTES
[facility]        NAME: Anju Stinson is a 38 y o  male  : 1975    MRN: [id number]  DATE: December 3, 2019  TIME: 12:00 PM    Assessment and Plan   RUQ pain [R10 11]  1  RUQ pain  XR chest pa & lateral       X-ray provider read, no acute findings  Patient's pain decreasing with Tylenol  Patient has a family doctor  Patient's vital signs stable  Negative Berumen sign  Pt denies any urinary complaints  No CVA tenderness  Offered to send patient to ER for evaluation today or have patient follow up with family doctor and he wants to go to family doctor  Patient would like to try conservative therapy at this time with ibuprofen and hot pack  Patient Instructions       Take medications as directed  Drink plenty of fluids  Follow up with family doctor this week  Go to ER immediately if new or worsening symptoms occur  Chief Complaint     Chief Complaint   Patient presents with    Back Pain     pt states starting today, lumbar spine pain  no injury  right sided pain with deep inspiration and abdominal pain with palpation  pt took pain reliever(unsure of kind) at 9am          History of Present Illness       Abdominal Pain   This is a new problem  The current episode started today  The onset quality is sudden  The problem occurs constantly  The problem has been unchanged  Pain location: R middle back radiating into RUQ/Epigastric area  The pain is at a severity of 4/10  Quality: Throbbing  Pertinent negatives include no belching, constipation, diarrhea, dysuria, fever, headaches, hematochezia, melena, nausea or vomiting  The pain is aggravated by palpation  The pain is relieved by nothing  Treatments tried: tylenol  The treatment provided moderate relief  Pt denies any urinary complaints    Review of Systems   Review of Systems   Constitutional: Negative  Negative for chills and fever  HENT: Negative  Eyes: Negative  Respiratory: Negative    Negative for chest tightness, shortness of breath and wheezing  Cardiovascular: Negative  Negative for chest pain and palpitations  Gastrointestinal: Positive for abdominal pain  Negative for constipation, diarrhea, hematochezia, melena, nausea and vomiting  Endocrine: Negative  Genitourinary: Negative  Negative for dysuria  Musculoskeletal: Negative for back pain and neck pain  Skin: Negative  Negative for color change, rash and wound  Neurological: Negative for dizziness, weakness, light-headedness, numbness and headaches  Hematological: Negative  Psychiatric/Behavioral: Negative            Current Medications       Current Outpatient Medications:     clobetasol (TEMOVATE) 0 05 % cream, APPLY UP TO TWICE DAILY TO SKIN OR ECZEMA, Disp: , Rfl: 0    levothyroxine 200 mcg tablet, Take 200 mcg by mouth daily, Disp: , Rfl: 6    amLODIPine (NORVASC) 5 mg tablet, , Disp: , Rfl:     atorvastatin (LIPITOR) 20 mg tablet, Take 20 mg by mouth daily at bedtime, Disp: , Rfl: 5    benazepril (LOTENSIN) 40 MG tablet, , Disp: , Rfl:     Ergocalciferol (VITAMIN D2 PO), take 1 capsule by oral route  every week, Disp: , Rfl:     glucose blood test strip, test blood sugar twice daily or prn, Disp: , Rfl:     hydrocortisone 1 % lotion, APPLY AT BEDTIME, Disp: , Rfl: 3    Insulin Degludec-Liraglutide (XULTOPHY) 100 units-3 6 mg/mL injection pen, inject (16UNITS) every morning; may increase by 2u every 3 days until B is below 120 in am, Disp: , Rfl:     Insulin Pen Needle (PEN NEEDLES) 32G X 4 MM MISC, inject once daily as directed, Disp: , Rfl:     metFORMIN (GLUCOPHAGE) 1000 MG tablet, Every 12 hours, Disp: , Rfl:     Current Allergies     Allergies as of 12/03/2019 - Reviewed 12/03/2019   Allergen Reaction Noted    No active allergies  04/24/2018            The following portions of the patient's history were reviewed and updated as appropriate: allergies, current medications, past family history, past medical history, past social history, past surgical history and problem list      Past Medical History:   Diagnosis Date    Cancer [facility])     Thyroid cancer    Diabetes mellitus ([address] )     Disease of thyroid gland     Hyperlipidemia     Hypertension        Past Surgical History:   Procedure Laterality Date    NECK SURGERY  04/21/2017       Family History   Problem Relation Age of Onset    Diabetes type II Mother         MELLITUS    COPD Father     Cancer Father         MB 2/4/16    LYMPHOMA CANCER         Medications have been verified  Objective   /85 (BP Location: Right arm, Patient Position: Sitting, Cuff Size: Adult)   Pulse 94   Temp (!) 96 6 °F (35 9 °C) (Tympanic)   Resp 19   Ht 5' 8" (1 727 m)   Wt 92 9 kg (204 lb 12 8 oz)   SpO2 96%   BMI 31 14 kg/m²        Physical Exam     Physical Exam   Constitutional: He appears well-developed and well-nourished  No distress  HENT:   Head: Normocephalic and atraumatic  Right Ear: External ear normal    Left Ear: External ear normal    Eyes: Conjunctivae are normal  Right eye exhibits no discharge  Left eye exhibits no discharge  Neck: Normal range of motion  Neck supple  Cardiovascular: Normal rate, regular rhythm, normal heart sounds and intact distal pulses  Pulmonary/Chest: Effort normal and breath sounds normal  No respiratory distress  He has no wheezes  He has no rales  Abdominal: Soft  Bowel sounds are normal  He exhibits no distension and no mass  There is tenderness (Very mild epigastric slightly off to the right  (-)Lonita Shilpi Sign)  There is no guarding  No CVA tenderness   Musculoskeletal:        Thoracic back: He exhibits pain (Mild pain to R middle thoracic area with contralateral twisting and bending)  He exhibits normal range of motion, no tenderness, no swelling, no deformity and no spasm  Lumbar back: He exhibits normal range of motion, no tenderness, no swelling, no deformity and no spasm     Lymphadenopathy:     He has no cervical adenopathy  Skin: Skin is warm  Capillary refill takes less than 2 seconds  No rash noted  He is not diaphoretic  No pallor  Nursing note and vitals reviewed

## 2020-03-19 ENCOUNTER — OFFICE VISIT (OUTPATIENT)
Dept: FAMILY MEDICINE CLINIC | Facility: CLINIC | Age: 45
End: 2020-03-19
Payer: COMMERCIAL

## 2020-03-19 VITALS
HEIGHT: 68 IN | BODY MASS INDEX: 30.89 KG/M2 | WEIGHT: 203.8 LBS | DIASTOLIC BLOOD PRESSURE: 84 MMHG | HEART RATE: 102 BPM | RESPIRATION RATE: 16 BRPM | SYSTOLIC BLOOD PRESSURE: 110 MMHG | TEMPERATURE: 97.6 F | OXYGEN SATURATION: 96 %

## 2020-03-19 DIAGNOSIS — E89.0 POSTOPERATIVE HYPOTHYROIDISM: ICD-10-CM

## 2020-03-19 DIAGNOSIS — E11.69 TYPE 2 DIABETES MELLITUS WITH OTHER SPECIFIED COMPLICATION, UNSPECIFIED WHETHER LONG TERM INSULIN USE (HCC): Primary | ICD-10-CM

## 2020-03-19 DIAGNOSIS — E11.65 UNCONTROLLED TYPE 2 DIABETES MELLITUS WITH HYPERGLYCEMIA (HCC): ICD-10-CM

## 2020-03-19 DIAGNOSIS — F41.9 ANXIETY: ICD-10-CM

## 2020-03-19 PROBLEM — J06.9 ACUTE UPPER RESPIRATORY INFECTION: Status: RESOLVED | Noted: 2019-04-15 | Resolved: 2020-03-19

## 2020-03-19 PROBLEM — R10.9 ABDOMINAL PAIN: Status: RESOLVED | Noted: 2019-04-15 | Resolved: 2020-03-19

## 2020-03-19 PROBLEM — I10 ESSENTIAL HYPERTENSION: Status: RESOLVED | Noted: 2019-04-15 | Resolved: 2020-03-19

## 2020-03-19 LAB — SL AMB POCT HEMOGLOBIN AIC: 11.6 (ref ?–6.5)

## 2020-03-19 PROCEDURE — 3008F BODY MASS INDEX DOCD: CPT | Performed by: PHYSICIAN ASSISTANT

## 2020-03-19 PROCEDURE — 3074F SYST BP LT 130 MM HG: CPT | Performed by: PHYSICIAN ASSISTANT

## 2020-03-19 PROCEDURE — 83036 HEMOGLOBIN GLYCOSYLATED A1C: CPT | Performed by: PHYSICIAN ASSISTANT

## 2020-03-19 PROCEDURE — 3079F DIAST BP 80-89 MM HG: CPT | Performed by: PHYSICIAN ASSISTANT

## 2020-03-19 PROCEDURE — 3046F HEMOGLOBIN A1C LEVEL >9.0%: CPT | Performed by: PHYSICIAN ASSISTANT

## 2020-03-19 PROCEDURE — 99214 OFFICE O/P EST MOD 30 MIN: CPT | Performed by: PHYSICIAN ASSISTANT

## 2020-03-19 PROCEDURE — 1036F TOBACCO NON-USER: CPT | Performed by: PHYSICIAN ASSISTANT

## 2020-03-19 RX ORDER — QUETIAPINE FUMARATE 50 MG/1
50 TABLET, FILM COATED ORAL
Qty: 90 TABLET | Refills: 1 | Status: SHIPPED | OUTPATIENT
Start: 2020-03-19 | End: 2021-04-22 | Stop reason: HOSPADM

## 2020-03-19 NOTE — PROGRESS NOTES
Assessment/Plan:    Initially the patient did not want to take his shoes off for his diabetic foot exam   He stated he was not here for this  I did advise him on the increased complications of diabetes especially if his sugars are not well controlled  He then took his shoes off     -I was able to encourage him to get a hemoglobin A1c level done today here in the office  Is currently at 11 5  I did advise him of the complications of diabetes including blindness, heart disease, nerve damage, infections, kidney damage  Patient does not want any other blood work done at this time   -I did try to encourage him to restart his insulin  Patient refuses  He states the only medication he would wonder restart his metformin  I did advise him that based on his elevated hemoglobin A1c level that he would also need to exercise and watch his diet closely which he states he does not have the time 4  He still does not want to start insulin  He states that the endocrinologist told him that they were not able to see him in the office at this time  I explained to him that some of the office is may be cancelling routine appointments because of the corona virus  -I did try to encourage him to start checking his blood sugars at least several times a week  We 1 day fasting and another day 2 hours post meal in to keep a log book form a  -he will continue on his levothyroxine as per the suggestion of his endocrinologist, Dr Eusebio Berumen who is in [facility]  -start Seroquel 50 mg daily  Advised to increase the medication up to 100 mg daily if needed after 2-3 weeks  -I did review his insurance card with him and he does have a 1 800 number on the back of the card for behavior health/counseling  I did advise him to call this number to find a local counselor  -he states his last eye exam was done at [facility] at least 1 or 2 years ago  The importance of having yearly exams has been discussed    Vannessa will call to get the last report  -I did recommend follow-up here in 6 weeks, sooner if needed  He has also been given a note to return to work tomorrow    BMI Counseling: Body mass index is 30 99 kg/m²  The BMI is above normal  Nutrition recommendations include reducing portion sizes, decreasing overall calorie intake and moderation in carbohydrate intake  Exercise recommendations include exercising 3-5 times per week  M*Modal software was used to dictate this note  It may contain errors with dictating incorrect words/spelling  Please contact provider directly for any questions  Diagnoses and all orders for this visit:    Type 2 diabetes mellitus with other specified complication, unspecified whether long term insulin use (HCC)  -     POCT hemoglobin A1c    Anxiety  -     QUEtiapine (SEROquel) 50 mg tablet; Take 1 tablet (50 mg total) by mouth daily at bedtime    Uncontrolled type 2 diabetes mellitus with hyperglycemia (HCC)  -     metFORMIN (GLUCOPHAGE) 1000 MG tablet; Take 1 tablet (1,000 mg total) by mouth 2 (two) times a day with meals    Postoperative hypothyroidism          Subjective:      Patient ID: Kev Cobian is a 38 y o  male  Patient presents today for for concerns about anger issues/anxiety  He states this is been ongoing for awhile  He states yesterday he got into an argument with his niece and said some things that he regrets  He has no thoughts of harming himself or harming anybody  He states he has never been on treatment for this  He did miss work today and he was told by his employer he needed a note to return to work  He states he was also told that counseling may benefit him  He was not aware that he had a phone number on the back of his insurance card to call for counseling  He states that he sleeping okay  He states his diet is normal     He does follow with Dr Bill/endocrinology for thyroid and diabetes    He states the only medication he is taking at this time is is levothyroxine 200 mcg daily  He states he stopped all his diabetes medications  He states it is too much for him to remember his medications along with checking his blood sugars  He states he wakes up in the morning go straight to work  He does not exercise  He states he does not have a time  He does not watch his diet closely  He just called his endocrinologist for refill of his levothyroxine  The following portions of the patient's history were reviewed and updated as appropriate:   He  has a past medical history of Cancer ([facility] ), Diabetes mellitus ([place] [address] ), Disease of thyroid gland, Hyperlipidemia, and Hypertension  He   Patient Active Problem List    Diagnosis Date Noted    Anxiety 03/19/2020    Uncontrolled type 2 diabetes mellitus with hyperglycemia ([place] [address] ) 03/19/2020    Diabetes ([place] [address] ) 04/15/2019    Postoperative hypothyroidism 04/15/2019     He  has a past surgical history that includes Neck surgery (04/21/2017)  His family history includes COPD in his father; Cancer in his father; Diabetes type II in his mother  He  reports that he has never smoked  He has never used smokeless tobacco  He reports that he does not drink alcohol or use drugs    Current Outpatient Medications   Medication Sig Dispense Refill    clobetasol (TEMOVATE) 0 05 % cream APPLY UP TO TWICE DAILY TO SKIN OR ECZEMA  0    glucose blood test strip test blood sugar twice daily or prn      hydrocortisone 1 % lotion APPLY AT BEDTIME  3    Insulin Pen Needle (PEN NEEDLES) 32G X 4 MM MISC inject once daily as directed      levothyroxine 200 mcg tablet Take 200 mcg by mouth daily  6    amLODIPine (NORVASC) 5 mg tablet       atorvastatin (LIPITOR) 20 mg tablet Take 20 mg by mouth daily at bedtime  5    benazepril (LOTENSIN) 40 MG tablet       Ergocalciferol (VITAMIN D2 PO) take 1 capsule by oral route  every week      Insulin Degludec-Liraglutide (XULTOPHY) 100 units-3 6 mg/mL injection pen inject (16UNITS) every morning; may increase by 2u every 3 days until B is below 120 in am      metFORMIN (GLUCOPHAGE) 1000 MG tablet Take 1 tablet (1,000 mg total) by mouth 2 (two) times a day with meals 180 tablet 1    QUEtiapine (SEROquel) 50 mg tablet Take 1 tablet (50 mg total) by mouth daily at bedtime 90 tablet 1     No current facility-administered medications for this visit  Current Outpatient Medications on File Prior to Visit   Medication Sig    clobetasol (TEMOVATE) 0 05 % cream APPLY UP TO TWICE DAILY TO SKIN OR ECZEMA    glucose blood test strip test blood sugar twice daily or prn    hydrocortisone 1 % lotion APPLY AT BEDTIME    Insulin Pen Needle (PEN NEEDLES) 32G X 4 MM MISC inject once daily as directed    levothyroxine 200 mcg tablet Take 200 mcg by mouth daily    amLODIPine (NORVASC) 5 mg tablet     atorvastatin (LIPITOR) 20 mg tablet Take 20 mg by mouth daily at bedtime    benazepril (LOTENSIN) 40 MG tablet     Ergocalciferol (VITAMIN D2 PO) take 1 capsule by oral route  every week    Insulin Degludec-Liraglutide (XULTOPHY) 100 units-3 6 mg/mL injection pen inject (16UNITS) every morning; may increase by 2u every 3 days until B is below 120 in am    [DISCONTINUED] metFORMIN (GLUCOPHAGE) 1000 MG tablet Every 12 hours     No current facility-administered medications on file prior to visit  He is allergic to no active allergies       Review of Systems   Constitutional: Negative  Respiratory: Negative for cough and shortness of breath  Cardiovascular: Negative for chest pain and leg swelling  Gastrointestinal: Negative for abdominal pain  Psychiatric/Behavioral:        As stated in HPI         Objective:      /84 (BP Location: Left arm, Patient Position: Sitting, Cuff Size: Large)   Pulse 102   Temp 97 6 °F (36 4 °C) (Tympanic)   Resp 16   Ht 5' 8" (1 727 m)   Wt 92 4 kg (203 lb 12 8 oz)   SpO2 96%   BMI 30 99 kg/m²          Physical Exam   Constitutional: He appears well-developed and well-nourished  No distress  HENT:   Head: Normocephalic and atraumatic  Right Ear: External ear normal    Left Ear: External ear normal    Mouth/Throat: Oropharynx is clear and moist  No oropharyngeal exudate  Neck: Neck supple  No thyromegaly present  Cardiovascular: Normal rate, regular rhythm and normal heart sounds  Pulses are no weak pulses  No murmur heard  Pulses:       Dorsalis pedis pulses are 2+ on the right side, and 2+ on the left side  Posterior tibial pulses are 2+ on the right side, and 2+ on the left side  Pulmonary/Chest: Effort normal and breath sounds normal  No respiratory distress  He has no wheezes  He has no rales  Abdominal: Soft  Bowel sounds are normal  He exhibits no mass  There is no tenderness  Musculoskeletal: He exhibits no edema  Feet:   Right Foot:   Skin Integrity: Negative for ulcer, skin breakdown, erythema, warmth, callus or dry skin  Left Foot:   Skin Integrity: Negative for ulcer, skin breakdown, erythema, warmth, callus or dry skin  Lymphadenopathy:     He has no cervical adenopathy  Neurological: He is alert  Skin: Skin is warm  Psychiatric: He has a normal mood and affect  Diabetic Foot Exam    Patient's shoes and socks removed  Right Foot/Ankle   Right Foot Inspection  Skin Exam: skin normal skin not intact, no dry skin, no warmth, no callus, no erythema, no maceration, no abnormal color, no pre-ulcer, no ulcer and no callus                          Toe Exam: no swelling, no tenderness, erythema and  no right toe deformity  Sensory       Monofilament testing: intact  Vascular    The right DP pulse is 2+  The right PT pulse is 2+       Left Foot/Ankle  Left Foot Inspection  Skin Exam: skin normalskin not intact, no dry skin, no warmth, no erythema, no maceration, normal color, no pre-ulcer, no ulcer and no callus                         Toe Exam: no swelling, no tenderness, no erythema and no left toe deformity                   Sensory       Monofilament: intact  Vascular    The left DP pulse is 2+  The left PT pulse is 2+  Assign Risk Category:  No deformity present; Loss of protective sensation;  No weak pulses       Risk: 0

## 2020-03-19 NOTE — LETTER
March 19, 2020     Patient: Eliberto Habermann   YOB: 1975   Date of Visit: 3/19/2020       To Whom it May Concern:    Natty Jamesaham is under my professional care  He was seen in my office on 3/19/2020  He is able to return to work on 03/20/2020  If you have any questions or concerns, please don't hesitate to call           Sincerely,          Mariajose Baird PA-C        CC: No Recipients

## 2020-03-27 ENCOUNTER — TELEPHONE (OUTPATIENT)
Dept: FAMILY MEDICINE CLINIC | Facility: CLINIC | Age: 45
End: 2020-03-27

## 2020-03-27 NOTE — TELEPHONE ENCOUNTER
He can cut in half    He can let me know if he feels better on this dose and then I can send a new prescription to the pharmacy

## 2020-03-27 NOTE — TELEPHONE ENCOUNTER
Pt informed he can cut Quetiapine in half and if that works for pt then he should call office and Rolf Morataya will send new script the patient agrees

## 2020-03-27 NOTE — TELEPHONE ENCOUNTER
Phone call from Lelo, states he saw you last week & you started him on Quetiapine 50mg  He feels like this dosage is too strong  He took the med Tues night & came to work feeling drowsy, he had to go home  He is wondering if the dose can be adjusted ?  Please call him back after 3:30 at [phone]

## 2020-06-02 ENCOUNTER — TELEPHONE (OUTPATIENT)
Dept: FAMILY MEDICINE CLINIC | Facility: CLINIC | Age: 45
End: 2020-06-02

## 2020-08-31 ENCOUNTER — APPOINTMENT (OUTPATIENT)
Dept: LAB | Facility: CLINIC | Age: 45
End: 2020-08-31
Payer: COMMERCIAL

## 2020-08-31 ENCOUNTER — TRANSCRIBE ORDERS (OUTPATIENT)
Dept: LAB | Facility: CLINIC | Age: 45
End: 2020-08-31

## 2020-08-31 DIAGNOSIS — E11.8 DIABETIC COMPLICATION (HCC): ICD-10-CM

## 2020-08-31 DIAGNOSIS — C73 MALIGNANT NEOPLASM OF THYROID GLAND (HCC): ICD-10-CM

## 2020-08-31 DIAGNOSIS — C73 MALIGNANT NEOPLASM OF THYROID GLAND (HCC): Primary | ICD-10-CM

## 2020-08-31 LAB
ALBUMIN SERPL BCP-MCNC: 3.9 G/DL (ref 3.5–5)
ALP SERPL-CCNC: 67 U/L (ref 46–116)
ALT SERPL W P-5'-P-CCNC: 23 U/L (ref 12–78)
ANION GAP SERPL CALCULATED.3IONS-SCNC: 5 MMOL/L (ref 4–13)
AST SERPL W P-5'-P-CCNC: 8 U/L (ref 5–45)
BASOPHILS # BLD AUTO: 0.06 THOUSANDS/ΜL (ref 0–0.1)
BASOPHILS NFR BLD AUTO: 2 % (ref 0–1)
BILIRUB SERPL-MCNC: 0.69 MG/DL (ref 0.2–1)
BUN SERPL-MCNC: 16 MG/DL (ref 5–25)
CALCIUM SERPL-MCNC: 8.4 MG/DL (ref 8.3–10.1)
CHLORIDE SERPL-SCNC: 105 MMOL/L (ref 100–108)
CO2 SERPL-SCNC: 29 MMOL/L (ref 21–32)
CREAT SERPL-MCNC: 0.99 MG/DL (ref 0.6–1.3)
EOSINOPHIL # BLD AUTO: 0.08 THOUSAND/ΜL (ref 0–0.61)
EOSINOPHIL NFR BLD AUTO: 2 % (ref 0–6)
ERYTHROCYTE [DISTWIDTH] IN BLOOD BY AUTOMATED COUNT: 12.6 % (ref 11.6–15.1)
EST. AVERAGE GLUCOSE BLD GHB EST-MCNC: 226 MG/DL
GFR SERPL CREATININE-BSD FRML MDRD: 91 ML/MIN/1.73SQ M
GLUCOSE P FAST SERPL-MCNC: 271 MG/DL (ref 65–99)
HBA1C MFR BLD: 9.5 %
HCT VFR BLD AUTO: 46.6 % (ref 36.5–49.3)
HGB BLD-MCNC: 16.3 G/DL (ref 12–17)
IMM GRANULOCYTES # BLD AUTO: 0.03 THOUSAND/UL (ref 0–0.2)
IMM GRANULOCYTES NFR BLD AUTO: 1 % (ref 0–2)
LYMPHOCYTES # BLD AUTO: 0.95 THOUSANDS/ΜL (ref 0.6–4.47)
LYMPHOCYTES NFR BLD AUTO: 24 % (ref 14–44)
MAGNESIUM SERPL-MCNC: 2.3 MG/DL (ref 1.6–2.6)
MCH RBC QN AUTO: 29.9 PG (ref 26.8–34.3)
MCHC RBC AUTO-ENTMCNC: 35 G/DL (ref 31.4–37.4)
MCV RBC AUTO: 86 FL (ref 82–98)
MONOCYTES # BLD AUTO: 0.35 THOUSAND/ΜL (ref 0.17–1.22)
MONOCYTES NFR BLD AUTO: 9 % (ref 4–12)
NEUTROPHILS # BLD AUTO: 2.47 THOUSANDS/ΜL (ref 1.85–7.62)
NEUTS SEG NFR BLD AUTO: 62 % (ref 43–75)
NRBC BLD AUTO-RTO: 0 /100 WBCS
PHOSPHATE SERPL-MCNC: 3.5 MG/DL (ref 2.7–4.5)
PLATELET # BLD AUTO: 191 THOUSANDS/UL (ref 149–390)
PMV BLD AUTO: 9.3 FL (ref 8.9–12.7)
POTASSIUM SERPL-SCNC: 4.6 MMOL/L (ref 3.5–5.3)
PROT SERPL-MCNC: 7.8 G/DL (ref 6.4–8.2)
RBC # BLD AUTO: 5.45 MILLION/UL (ref 3.88–5.62)
SODIUM SERPL-SCNC: 139 MMOL/L (ref 136–145)
T4 FREE SERPL-MCNC: 1.42 NG/DL (ref 0.76–1.46)
TSH SERPL DL<=0.05 MIU/L-ACNC: 10.1 UIU/ML (ref 0.36–3.74)
WBC # BLD AUTO: 3.94 THOUSAND/UL (ref 4.31–10.16)

## 2020-08-31 PROCEDURE — 84443 ASSAY THYROID STIM HORMONE: CPT

## 2020-08-31 PROCEDURE — 36415 COLL VENOUS BLD VENIPUNCTURE: CPT

## 2020-08-31 PROCEDURE — 85025 COMPLETE CBC W/AUTO DIFF WBC: CPT

## 2020-08-31 PROCEDURE — 80053 COMPREHEN METABOLIC PANEL: CPT

## 2020-08-31 PROCEDURE — 3046F HEMOGLOBIN A1C LEVEL >9.0%: CPT | Performed by: PHYSICIAN ASSISTANT

## 2020-08-31 PROCEDURE — 83036 HEMOGLOBIN GLYCOSYLATED A1C: CPT

## 2020-08-31 PROCEDURE — 84439 ASSAY OF FREE THYROXINE: CPT

## 2020-08-31 PROCEDURE — 84100 ASSAY OF PHOSPHORUS: CPT

## 2020-08-31 PROCEDURE — 84432 ASSAY OF THYROGLOBULIN: CPT

## 2020-08-31 PROCEDURE — 83735 ASSAY OF MAGNESIUM: CPT

## 2020-08-31 PROCEDURE — 86800 THYROGLOBULIN ANTIBODY: CPT

## 2020-09-01 LAB
THYROGLOB AB SERPL-ACNC: <1 IU/ML (ref 0–0.9)
THYROGLOB SERPL-MCNC: 0.2 NG/ML (ref 1.4–29.2)

## 2020-10-05 ENCOUNTER — TRANSCRIBE ORDERS (OUTPATIENT)
Dept: ADMINISTRATIVE | Facility: HOSPITAL | Age: 45
End: 2020-10-05

## 2020-10-05 DIAGNOSIS — E11.8 TYPE 2 DIABETES MELLITUS WITH UNSPECIFIED COMPLICATIONS (HCC): ICD-10-CM

## 2020-10-05 DIAGNOSIS — C73 MALIGNANT NEOPLASM OF THYROID GLAND (HCC): Primary | ICD-10-CM

## 2020-10-09 ENCOUNTER — HOSPITAL ENCOUNTER (OUTPATIENT)
Dept: ULTRASOUND IMAGING | Facility: HOSPITAL | Age: 45
Discharge: HOME | End: 2020-10-09
Attending: SPECIALIST
Payer: COMMERCIAL

## 2020-10-09 DIAGNOSIS — E11.8 TYPE 2 DIABETES MELLITUS WITH UNSPECIFIED COMPLICATIONS (HCC): ICD-10-CM

## 2020-10-09 DIAGNOSIS — C73 MALIGNANT NEOPLASM OF THYROID GLAND (HCC): ICD-10-CM

## 2020-10-09 PROCEDURE — 76536 US EXAM OF HEAD AND NECK: CPT

## 2020-11-19 ENCOUNTER — VBI (OUTPATIENT)
Dept: ADMINISTRATIVE | Facility: OTHER | Age: 45
End: 2020-11-19

## 2020-11-30 ENCOUNTER — OFFICE VISIT (OUTPATIENT)
Dept: URGENT CARE | Age: 45
End: 2020-11-30
Payer: COMMERCIAL

## 2020-11-30 VITALS
TEMPERATURE: 97.5 F | HEART RATE: 92 BPM | BODY MASS INDEX: 30.31 KG/M2 | WEIGHT: 200 LBS | HEIGHT: 68 IN | OXYGEN SATURATION: 97 % | RESPIRATION RATE: 18 BRPM

## 2020-11-30 DIAGNOSIS — R19.7 DIARRHEA, UNSPECIFIED TYPE: Primary | ICD-10-CM

## 2020-11-30 PROCEDURE — U0003 INFECTIOUS AGENT DETECTION BY NUCLEIC ACID (DNA OR RNA); SEVERE ACUTE RESPIRATORY SYNDROME CORONAVIRUS 2 (SARS-COV-2) (CORONAVIRUS DISEASE [COVID-19]), AMPLIFIED PROBE TECHNIQUE, MAKING USE OF HIGH THROUGHPUT TECHNOLOGIES AS DESCRIBED BY CMS-2020-01-R: HCPCS | Performed by: NURSE PRACTITIONER

## 2020-11-30 PROCEDURE — 99213 OFFICE O/P EST LOW 20 MIN: CPT | Performed by: NURSE PRACTITIONER

## 2020-12-01 LAB — SARS-COV-2 RNA SPEC QL NAA+PROBE: NOT DETECTED

## 2021-02-04 ENCOUNTER — TELEPHONE (OUTPATIENT)
Dept: FAMILY MEDICINE CLINIC | Facility: CLINIC | Age: 46
End: 2021-02-04

## 2021-02-04 NOTE — TELEPHONE ENCOUNTER
Patient has not been seen here in the office since March 2020 for his diabetes  Please call him for a routine appointment    FYI he has been very resistant to any type of treatment in the past   Thank you

## 2021-04-19 ENCOUNTER — TELEMEDICINE (OUTPATIENT)
Dept: FAMILY MEDICINE CLINIC | Facility: CLINIC | Age: 46
End: 2021-04-19
Payer: COMMERCIAL

## 2021-04-19 ENCOUNTER — TELEPHONE (OUTPATIENT)
Dept: FAMILY MEDICINE CLINIC | Facility: CLINIC | Age: 46
End: 2021-04-19

## 2021-04-19 VITALS — BODY MASS INDEX: 30.31 KG/M2 | HEIGHT: 68 IN | WEIGHT: 200 LBS

## 2021-04-19 DIAGNOSIS — E11.65 UNCONTROLLED TYPE 2 DIABETES MELLITUS WITH HYPERGLYCEMIA (HCC): ICD-10-CM

## 2021-04-19 DIAGNOSIS — E89.0 POSTOPERATIVE HYPOTHYROIDISM: ICD-10-CM

## 2021-04-19 DIAGNOSIS — J06.9 ACUTE UPPER RESPIRATORY INFECTION: Primary | ICD-10-CM

## 2021-04-19 PROCEDURE — 3725F SCREEN DEPRESSION PERFORMED: CPT | Performed by: PHYSICIAN ASSISTANT

## 2021-04-19 PROCEDURE — 99214 OFFICE O/P EST MOD 30 MIN: CPT | Performed by: PHYSICIAN ASSISTANT

## 2021-04-19 RX ORDER — LEVOTHYROXINE SODIUM 0.03 MG/1
25 TABLET ORAL DAILY
COMMUNITY

## 2021-04-19 NOTE — PROGRESS NOTES
Virtual Brief Visit    Assessment/Plan:    Problem List Items Addressed This Visit        Endocrine    Postoperative hypothyroidism    Relevant Medications    levothyroxine 25 mcg tablet    Other Relevant Orders    Microalbumin / creatinine urine ratio    Comprehensive metabolic panel    Hemoglobin A1C    Lipid panel    TSH, 3rd generation    Uncontrolled type 2 diabetes mellitus with hyperglycemia (HCC)    Relevant Orders    Microalbumin / creatinine urine ratio    Comprehensive metabolic panel    Hemoglobin A1C    Lipid panel    TSH, 3rd generation       Respiratory    Acute upper respiratory infection - Primary    Relevant Orders    Microalbumin / creatinine urine ratio    Comprehensive metabolic panel    Hemoglobin A1C    Lipid panel    TSH, 3rd generation         3 days post upper respiratory symptoms   - patient refuses any COVID-19 testing at this time   -recommend over-the-counter generic Sudafed as needed as package directs   -over-the-counter Mucinex for cough as needed as package directs  -advised to call with any increasing symptoms  -he did not request a note for work at this time   - patient encouraged to make an appointment in May for his diabetes  Proceed with fasting labs prior to that visit      M*[employer] software was used to dictate this note  It may contain errors with dictating incorrect words/spelling  Please contact provider directly for any questions  Reason for visit is   Chief Complaint   Patient presents with    Cough    Headache    Generalized Body Aches    Virtual Brief Visit        Encounter provider Leland Fernandez PA-C    Provider located at [address] [place] [address] [place] [address]   [address] [address]    Recent Visits  No visits were found meeting these conditions     Showing recent visits within past 7 days and meeting all other requirements     Today's Visits  Date Type Provider Dept 04/19/21 Telemedicine Mariajose Baird PA-C [facility]   Showing today's visits and meeting all other requirements     Future Appointments  No visits were found meeting these conditions  Showing future appointments within next 150 days and meeting all other requirements        After connecting through  telephone and patient was informed that this is not a secure, HIPAA-compliant platform  He agrees to proceed  , the patient was identified by name and date of birth  Earlis Sandhoff was informed that this is a telemedicine visit and that the visit is being conducted through  telephone and patient was informed that this is not a secure, HIPAA-compliant platform  He agrees to proceed     My office door was closed  No one else was in the room  He acknowledged consent and understanding of privacy and security of the platform  The patient has agreed to participate and understands he can discontinue the visit at any time  It was my intent to perform this visit via video technology but the patient was not able to do a video connection so the visit was completed via audio telephone only  Patient is aware this is a billable service  Subjective    Earlis Sandhoff is a 54 y o  male  Virtual visit for upper respiratory symptoms  Patient has in acute visit via telephone because I was not able to connect through [employer]  He states he has had a cough, headache, body aches over the past 2-3 days  He denies any known COVID-19 contacts  He states he has been working except for today  He denies any fever, nasal congestion, loss of sense of smell or taste, nausea, vomiting, diarrhea, shortness of breath with activity  He states that he had a COVID-19 test in December which came back negative  He refuses to do any type of COVID-19 testing at this time  He has been utilizing DayQuil and NyQuil with no relief    He states his temperature has been ranging in the 97's    he states he does not check his blood sugars because he does not have the time with work  He has not been here since August also because he states he has not had the time  Past Medical History:   Diagnosis Date    Cancer [facility])     Thyroid cancer    Diabetes [facility] ([facility] )     Disease of thyroid gland     Hyperlipidemia     Hypertension        Past Surgical History:   Procedure Laterality Date    NECK SURGERY  04/21/2017       Current Outpatient Medications   Medication Sig Dispense Refill    amLODIPine (NORVASC) 5 mg tablet       atorvastatin (LIPITOR) 20 mg tablet Take 20 mg by mouth daily at bedtime  5    benazepril (LOTENSIN) 40 MG tablet       clobetasol (TEMOVATE) 0 05 % cream APPLY UP TO TWICE DAILY TO SKIN OR ECZEMA  0    Ergocalciferol (VITAMIN D2 PO) take 1 capsule by oral route  every week      glucose blood test strip test blood sugar twice daily or prn      hydrocortisone 1 % lotion APPLY AT BEDTIME  3    Insulin Degludec-Liraglutide (XULTOPHY) 100 units-3 6 mg/mL injection pen inject (16UNITS) every morning; may increase by 2u every 3 days until B is below 120 in am      Insulin Pen Needle (PEN NEEDLES) 32G X 4 MM MISC inject once daily as directed      levothyroxine 200 mcg tablet Take 200 mcg by mouth daily  6    levothyroxine 25 mcg tablet Take 25 mcg by mouth daily      metFORMIN (GLUCOPHAGE) 1000 MG tablet Take 1 tablet (1,000 mg total) by mouth 2 (two) times a day with meals 180 tablet 1    QUEtiapine (SEROquel) 50 mg tablet Take 1 tablet (50 mg total) by mouth daily at bedtime (Patient not taking: Reported on 11/30/2020) 90 tablet 1     No current facility-administered medications for this visit  Allergies   Allergen Reactions    No Active Allergies        Review of Systems   Constitutional: Negative for chills and fever  HENT: Positive for postnasal drip  Negative for congestion, rhinorrhea and sore throat  Respiratory: Positive for cough  Negative for shortness of breath      Cardiovascular: Negative for chest pain  Gastrointestinal: Negative for abdominal pain, diarrhea, nausea and vomiting  Vitals:    04/19/21 0846   Weight: 90 7 kg (200 lb)   Height: 5' 8" (1 727 m)         I spent 10 minutes directly with the patient during this visit    [address] acknowledges that he has consented to an online visit or consultation  He understands that the online visit is based solely on information provided by him, and that, in the absence of a face-to-face physical evaluation by the physician, the diagnosis he receives is both limited and provisional in terms of accuracy and completeness  This is not intended to replace a full medical face-to-face evaluation by the physician  Vinay Erickson understands and accepts these terms

## 2021-04-19 NOTE — TELEPHONE ENCOUNTER
Phone call to pt to sched a follow up appt in May for diabetes as per Mariajose (EDERS) Pt states he will call back to sched

## 2021-04-20 ENCOUNTER — HOSPITAL ENCOUNTER (INPATIENT)
Facility: HOSPITAL | Age: 46
LOS: 2 days | Discharge: HOME | DRG: 871 | End: 2021-04-22
Attending: EMERGENCY MEDICINE | Admitting: INTERNAL MEDICINE
Payer: COMMERCIAL

## 2021-04-20 ENCOUNTER — APPOINTMENT (EMERGENCY)
Dept: RADIOLOGY | Facility: HOSPITAL | Age: 46
DRG: 871 | End: 2021-04-20
Payer: COMMERCIAL

## 2021-04-20 DIAGNOSIS — J12.82 PNEUMONIA DUE TO COVID-19 VIRUS: Primary | ICD-10-CM

## 2021-04-20 DIAGNOSIS — U07.1 PNEUMONIA DUE TO COVID-19 VIRUS: Primary | ICD-10-CM

## 2021-04-20 PROBLEM — Z79.4 TYPE 2 DIABETES MELLITUS WITH HYPERGLYCEMIA, WITH LONG-TERM CURRENT USE OF INSULIN (HCC): Chronic | Status: ACTIVE | Noted: 2019-04-15

## 2021-04-20 PROBLEM — A41.89 VIRAL SEPSIS (HCC): Status: ACTIVE | Noted: 2021-04-20

## 2021-04-20 PROBLEM — E87.6 HYPOKALEMIA: Status: ACTIVE | Noted: 2021-04-20

## 2021-04-20 PROBLEM — B97.89 VIRAL SEPSIS (HCC): Status: ACTIVE | Noted: 2021-04-20

## 2021-04-20 PROBLEM — E11.65 TYPE 2 DIABETES MELLITUS WITH HYPERGLYCEMIA, WITH LONG-TERM CURRENT USE OF INSULIN (HCC): Chronic | Status: ACTIVE | Noted: 2019-04-15

## 2021-04-20 LAB
ALBUMIN SERPL BCP-MCNC: 3.6 G/DL (ref 3.5–5)
ALP SERPL-CCNC: 72 U/L (ref 46–116)
ALT SERPL W P-5'-P-CCNC: 33 U/L (ref 12–78)
ANION GAP SERPL CALCULATED.3IONS-SCNC: 11 MMOL/L (ref 4–13)
APTT PPP: 28 SECONDS (ref 23–37)
AST SERPL W P-5'-P-CCNC: 19 U/L (ref 5–45)
BASOPHILS # BLD AUTO: 0.01 THOUSANDS/ΜL (ref 0–0.1)
BASOPHILS NFR BLD AUTO: 1 % (ref 0–1)
BILIRUB SERPL-MCNC: 0.91 MG/DL (ref 0.2–1)
BUN SERPL-MCNC: 21 MG/DL (ref 5–25)
CALCIUM SERPL-MCNC: 7.8 MG/DL (ref 8.3–10.1)
CHLORIDE SERPL-SCNC: 99 MMOL/L (ref 100–108)
CO2 SERPL-SCNC: 26 MMOL/L (ref 21–32)
CREAT SERPL-MCNC: 0.99 MG/DL (ref 0.6–1.3)
D DIMER PPP FEU-MCNC: 0.27 UG/ML FEU
EOSINOPHIL # BLD AUTO: 0 THOUSAND/ΜL (ref 0–0.61)
EOSINOPHIL NFR BLD AUTO: 0 % (ref 0–6)
ERYTHROCYTE [DISTWIDTH] IN BLOOD BY AUTOMATED COUNT: 12.4 % (ref 11.6–15.1)
FLUAV RNA RESP QL NAA+PROBE: NEGATIVE
FLUBV RNA RESP QL NAA+PROBE: NEGATIVE
GFR SERPL CREATININE-BSD FRML MDRD: 91 ML/MIN/1.73SQ M
GLUCOSE SERPL-MCNC: 133 MG/DL (ref 65–140)
GLUCOSE SERPL-MCNC: 158 MG/DL (ref 65–140)
GLUCOSE SERPL-MCNC: 217 MG/DL (ref 65–140)
HCT VFR BLD AUTO: 43.1 % (ref 36.5–49.3)
HGB BLD-MCNC: 14.9 G/DL (ref 12–17)
IMM GRANULOCYTES # BLD AUTO: 0.01 THOUSAND/UL (ref 0–0.2)
IMM GRANULOCYTES NFR BLD AUTO: 1 % (ref 0–2)
INR PPP: 1.05 (ref 0.84–1.19)
LACTATE SERPL-SCNC: 1.3 MMOL/L (ref 0.5–2)
LYMPHOCYTES # BLD AUTO: 0.33 THOUSANDS/ΜL (ref 0.6–4.47)
LYMPHOCYTES NFR BLD AUTO: 16 % (ref 14–44)
MCH RBC QN AUTO: 29.3 PG (ref 26.8–34.3)
MCHC RBC AUTO-ENTMCNC: 34.6 G/DL (ref 31.4–37.4)
MCV RBC AUTO: 85 FL (ref 82–98)
MONOCYTES # BLD AUTO: 0.23 THOUSAND/ΜL (ref 0.17–1.22)
MONOCYTES NFR BLD AUTO: 11 % (ref 4–12)
NEUTROPHILS # BLD AUTO: 1.45 THOUSANDS/ΜL (ref 1.85–7.62)
NEUTS SEG NFR BLD AUTO: 71 % (ref 43–75)
NRBC BLD AUTO-RTO: 0 /100 WBCS
NT-PROBNP SERPL-MCNC: 22 PG/ML
PLATELET # BLD AUTO: 136 THOUSANDS/UL (ref 149–390)
PMV BLD AUTO: 9.2 FL (ref 8.9–12.7)
POTASSIUM SERPL-SCNC: 3.3 MMOL/L (ref 3.5–5.3)
PROCALCITONIN SERPL-MCNC: <0.05 NG/ML
PROT SERPL-MCNC: 7.9 G/DL (ref 6.4–8.2)
PROTHROMBIN TIME: 13.8 SECONDS (ref 11.6–14.5)
RBC # BLD AUTO: 5.08 MILLION/UL (ref 3.88–5.62)
RSV RNA RESP QL NAA+PROBE: NEGATIVE
SARS-COV-2 RNA RESP QL NAA+PROBE: POSITIVE
SODIUM SERPL-SCNC: 136 MMOL/L (ref 136–145)
TROPONIN I SERPL-MCNC: <0.02 NG/ML
WBC # BLD AUTO: 2.03 THOUSAND/UL (ref 4.31–10.16)

## 2021-04-20 PROCEDURE — 80053 COMPREHEN METABOLIC PANEL: CPT | Performed by: EMERGENCY MEDICINE

## 2021-04-20 PROCEDURE — 0241U HB NFCT DS VIR RESP RNA 4 TRGT: CPT | Performed by: EMERGENCY MEDICINE

## 2021-04-20 PROCEDURE — 99285 EMERGENCY DEPT VISIT HI MDM: CPT | Performed by: EMERGENCY MEDICINE

## 2021-04-20 PROCEDURE — 85379 FIBRIN DEGRADATION QUANT: CPT | Performed by: EMERGENCY MEDICINE

## 2021-04-20 PROCEDURE — 36415 COLL VENOUS BLD VENIPUNCTURE: CPT | Performed by: EMERGENCY MEDICINE

## 2021-04-20 PROCEDURE — 96365 THER/PROPH/DIAG IV INF INIT: CPT

## 2021-04-20 PROCEDURE — 85025 COMPLETE CBC W/AUTO DIFF WBC: CPT | Performed by: EMERGENCY MEDICINE

## 2021-04-20 PROCEDURE — 84484 ASSAY OF TROPONIN QUANT: CPT | Performed by: EMERGENCY MEDICINE

## 2021-04-20 PROCEDURE — XW033E5 INTRODUCTION OF REMDESIVIR ANTI-INFECTIVE INTO PERIPHERAL VEIN, PERCUTANEOUS APPROACH, NEW TECHNOLOGY GROUP 5: ICD-10-PCS | Performed by: HOSPITALIST

## 2021-04-20 PROCEDURE — 85610 PROTHROMBIN TIME: CPT | Performed by: EMERGENCY MEDICINE

## 2021-04-20 PROCEDURE — 93005 ELECTROCARDIOGRAM TRACING: CPT

## 2021-04-20 PROCEDURE — 84145 PROCALCITONIN (PCT): CPT | Performed by: EMERGENCY MEDICINE

## 2021-04-20 PROCEDURE — 82948 REAGENT STRIP/BLOOD GLUCOSE: CPT

## 2021-04-20 PROCEDURE — 83605 ASSAY OF LACTIC ACID: CPT | Performed by: EMERGENCY MEDICINE

## 2021-04-20 PROCEDURE — 99223 1ST HOSP IP/OBS HIGH 75: CPT | Performed by: PHYSICIAN ASSISTANT

## 2021-04-20 PROCEDURE — 87040 BLOOD CULTURE FOR BACTERIA: CPT | Performed by: EMERGENCY MEDICINE

## 2021-04-20 PROCEDURE — 85730 THROMBOPLASTIN TIME PARTIAL: CPT | Performed by: EMERGENCY MEDICINE

## 2021-04-20 PROCEDURE — 99285 EMERGENCY DEPT VISIT HI MDM: CPT

## 2021-04-20 PROCEDURE — 71045 X-RAY EXAM CHEST 1 VIEW: CPT

## 2021-04-20 PROCEDURE — 83880 ASSAY OF NATRIURETIC PEPTIDE: CPT | Performed by: EMERGENCY MEDICINE

## 2021-04-20 PROCEDURE — 96360 HYDRATION IV INFUSION INIT: CPT

## 2021-04-20 RX ORDER — ATORVASTATIN CALCIUM 20 MG/1
20 TABLET, FILM COATED ORAL
Status: DISCONTINUED | OUTPATIENT
Start: 2021-04-20 | End: 2021-04-22 | Stop reason: HOSPADM

## 2021-04-20 RX ORDER — LEVOTHYROXINE SODIUM 0.03 MG/1
25 TABLET ORAL
Status: DISCONTINUED | OUTPATIENT
Start: 2021-04-21 | End: 2021-04-22 | Stop reason: HOSPADM

## 2021-04-20 RX ORDER — SODIUM CHLORIDE 9 MG/ML
125 INJECTION, SOLUTION INTRAVENOUS CONTINUOUS
Status: DISCONTINUED | OUTPATIENT
Start: 2021-04-20 | End: 2021-04-22 | Stop reason: HOSPADM

## 2021-04-20 RX ORDER — ONDANSETRON 2 MG/ML
4 INJECTION INTRAMUSCULAR; INTRAVENOUS EVERY 6 HOURS PRN
Status: DISCONTINUED | OUTPATIENT
Start: 2021-04-20 | End: 2021-04-22 | Stop reason: HOSPADM

## 2021-04-20 RX ORDER — INSULIN GLARGINE 100 [IU]/ML
16 INJECTION, SOLUTION SUBCUTANEOUS EVERY MORNING
Status: DISCONTINUED | OUTPATIENT
Start: 2021-04-21 | End: 2021-04-22 | Stop reason: HOSPADM

## 2021-04-20 RX ORDER — ACETAMINOPHEN 325 MG/1
650 TABLET ORAL EVERY 6 HOURS PRN
Status: DISCONTINUED | OUTPATIENT
Start: 2021-04-20 | End: 2021-04-22 | Stop reason: HOSPADM

## 2021-04-20 RX ORDER — MELATONIN
2000 DAILY
Status: DISCONTINUED | OUTPATIENT
Start: 2021-04-21 | End: 2021-04-22 | Stop reason: HOSPADM

## 2021-04-20 RX ORDER — ASCORBIC ACID 500 MG
1000 TABLET ORAL EVERY 12 HOURS SCHEDULED
Status: DISCONTINUED | OUTPATIENT
Start: 2021-04-20 | End: 2021-04-22 | Stop reason: HOSPADM

## 2021-04-20 RX ORDER — LEVOTHYROXINE SODIUM 0.1 MG/1
200 TABLET ORAL
Status: DISCONTINUED | OUTPATIENT
Start: 2021-04-21 | End: 2021-04-22 | Stop reason: HOSPADM

## 2021-04-20 RX ORDER — BENZONATATE 100 MG/1
200 CAPSULE ORAL 3 TIMES DAILY
Status: DISCONTINUED | OUTPATIENT
Start: 2021-04-20 | End: 2021-04-22 | Stop reason: HOSPADM

## 2021-04-20 RX ORDER — ZINC SULFATE 50(220)MG
220 CAPSULE ORAL DAILY
Status: DISCONTINUED | OUTPATIENT
Start: 2021-04-21 | End: 2021-04-22 | Stop reason: HOSPADM

## 2021-04-20 RX ORDER — AMLODIPINE BESYLATE 5 MG/1
5 TABLET ORAL DAILY
Status: DISCONTINUED | OUTPATIENT
Start: 2021-04-21 | End: 2021-04-22 | Stop reason: HOSPADM

## 2021-04-20 RX ORDER — POTASSIUM CHLORIDE 20 MEQ/1
40 TABLET, EXTENDED RELEASE ORAL ONCE
Status: COMPLETED | OUTPATIENT
Start: 2021-04-20 | End: 2021-04-20

## 2021-04-20 RX ORDER — MULTIVITAMIN/IRON/FOLIC ACID 18MG-0.4MG
1 TABLET ORAL DAILY
Status: DISCONTINUED | OUTPATIENT
Start: 2021-04-28 | End: 2021-04-22 | Stop reason: HOSPADM

## 2021-04-20 RX ORDER — LISINOPRIL 20 MG/1
40 TABLET ORAL DAILY
Status: DISCONTINUED | OUTPATIENT
Start: 2021-04-21 | End: 2021-04-22 | Stop reason: HOSPADM

## 2021-04-20 RX ADMIN — SODIUM CHLORIDE 125 ML/HR: 0.9 INJECTION, SOLUTION INTRAVENOUS at 20:48

## 2021-04-20 RX ADMIN — SODIUM CHLORIDE 1000 ML: 0.9 INJECTION, SOLUTION INTRAVENOUS at 16:06

## 2021-04-20 RX ADMIN — CEFTRIAXONE SODIUM 1000 MG: 10 INJECTION, POWDER, FOR SOLUTION INTRAVENOUS at 16:22

## 2021-04-20 RX ADMIN — OXYCODONE HYDROCHLORIDE AND ACETAMINOPHEN 1000 MG: 500 TABLET ORAL at 20:44

## 2021-04-20 RX ADMIN — POTASSIUM CHLORIDE 40 MEQ: 1500 TABLET, EXTENDED RELEASE ORAL at 20:45

## 2021-04-20 RX ADMIN — ATORVASTATIN CALCIUM 20 MG: 40 TABLET, FILM COATED ORAL at 22:12

## 2021-04-20 RX ADMIN — ENOXAPARIN SODIUM 30 MG: 30 INJECTION SUBCUTANEOUS at 20:47

## 2021-04-20 RX ADMIN — REMDESIVIR 200 MG: 100 INJECTION, POWDER, LYOPHILIZED, FOR SOLUTION INTRAVENOUS at 22:09

## 2021-04-20 RX ADMIN — INSULIN LISPRO 2 UNITS: 100 INJECTION, SOLUTION INTRAVENOUS; SUBCUTANEOUS at 22:11

## 2021-04-20 RX ADMIN — BENZONATATE 200 MG: 100 CAPSULE ORAL at 20:45

## 2021-04-20 NOTE — ED PROVIDER NOTES
History  Chief Complaint   Patient presents with    Fever - 9 weeks to 74 years     pt reprts fatigue fever and cough for a few days       History provided by:  Patient  Fever - 9 weeks to 74 years  Max temp prior to arrival:  102  Temp source:  Oral  Severity:  Moderate  Onset quality:  Gradual  Duration:  3 days  Timing:  Constant  Progression:  Worsening  Chronicity:  New  Relieved by:  None tried  Worsened by:  Nothing  Ineffective treatments:  None tried  Associated symptoms: chills, confusion, congestion, cough, myalgias, nausea, rhinorrhea, somnolence and sore throat    Associated symptoms: no chest pain, no diarrhea, no dysuria, no headaches, no rash and no vomiting        Prior to Admission Medications   Prescriptions Last Dose Informant Patient Reported? Taking?    Ergocalciferol (VITAMIN D2 PO) Not Taking at Unknown time  Yes No   Sig: take 1 capsule by oral route  every week   Insulin Degludec-Liraglutide (XULTOPHY) 100 units-3 6 mg/mL injection pen Not Taking at Unknown time  Yes No   Sig: inject (16UNITS) every morning; may increase by 2u every 3 days until B is below 120 in am   Insulin Pen Needle (PEN NEEDLES) 32G X 4 MM MISC   Yes No   Sig: inject once daily as directed   QUEtiapine (SEROquel) 50 mg tablet Not Taking at Unknown time  No No   Sig: Take 1 tablet (50 mg total) by mouth daily at bedtime   Patient not taking: Reported on 11/30/2020   amLODIPine (NORVASC) 5 mg tablet Unknown at Unknown time  Yes No   atorvastatin (LIPITOR) 20 mg tablet Not Taking at Unknown time  Yes No   Sig: Take 20 mg by mouth daily at bedtime   benazepril (LOTENSIN) 40 MG tablet Not Taking at Unknown time  Yes No   clobetasol (TEMOVATE) 0 05 % cream Not Taking at Unknown time  Yes No   Sig: APPLY UP TO TWICE DAILY TO SKIN OR ECZEMA   glucose blood test strip   Yes Yes   Sig: test blood sugar twice daily or prn   hydrocortisone 1 % lotion Not Taking at Unknown time  Yes No   Sig: APPLY AT BEDTIME   levothyroxine 200 mcg tablet 4/20/2021 at Unknown time  Yes Yes   Sig: Take 200 mcg by mouth daily   levothyroxine 25 mcg tablet 4/20/2021 at Unknown time  Yes Yes   Sig: Take 25 mcg by mouth daily   metFORMIN (GLUCOPHAGE) 1000 MG tablet Past Month at Unknown time  No Yes   Sig: Take 1 tablet (1,000 mg total) by mouth 2 (two) times a day with meals      Facility-Administered Medications: None       Past Medical History:   Diagnosis Date    Cancer ([facility] )     Thyroid cancer    Diabetes mellitus ([place] [address] )     Disease of thyroid gland     Hyperlipidemia     Hypertension        Past Surgical History:   Procedure Laterality Date    NECK SURGERY  04/21/2017       Family History   Problem Relation Age of Onset    Diabetes type II Mother         MELLITUS    COPD Father     Cancer Father         MB 2/4/16    LYMPHOMA CANCER     I have reviewed and agree with the history as documented  E-Cigarette/Vaping    E-Cigarette Use Never User      E-Cigarette/Vaping Substances    Nicotine No     THC No     CBD No     Flavoring No     Other No     Unknown No      Social History     Tobacco Use    Smoking status: Never Smoker    Smokeless tobacco: Never Used   Substance Use Topics    Alcohol use: Never     Frequency: Never    Drug use: No       Review of Systems   Constitutional: Positive for chills and fever  Negative for activity change and diaphoresis  HENT: Positive for congestion, rhinorrhea and sore throat  Negative for sinus pressure  Eyes: Negative for pain and visual disturbance  Respiratory: Positive for cough  Negative for chest tightness, shortness of breath, wheezing and stridor  Cardiovascular: Negative for chest pain and palpitations  Gastrointestinal: Positive for nausea  Negative for abdominal distention, abdominal pain, constipation, diarrhea and vomiting  Genitourinary: Negative for dysuria and frequency  Musculoskeletal: Positive for myalgias  Negative for neck pain and neck stiffness     Skin: Negative for rash  Neurological: Negative for dizziness, speech difficulty, light-headedness, numbness and headaches  Psychiatric/Behavioral: Positive for confusion  Physical Exam  Physical Exam  Vitals signs reviewed  Constitutional:       General: He is in acute distress ( Uncomfortable in appearance)  Appearance: He is well-developed  He is not diaphoretic  HENT:      Head: Normocephalic and atraumatic  Right Ear: External ear normal       Left Ear: External ear normal       Nose: Nose normal    Eyes:      General:         Right eye: No discharge  Left eye: No discharge  Pupils: Pupils are equal, round, and reactive to light  Neck:      Musculoskeletal: Normal range of motion and neck supple  Trachea: No tracheal deviation  Cardiovascular:      Rate and Rhythm: Normal rate and regular rhythm  Heart sounds: Normal heart sounds  No murmur  Pulmonary:      Effort: Pulmonary effort is normal  No respiratory distress  Breath sounds: Normal breath sounds  No stridor  Abdominal:      General: There is no distension  Palpations: Abdomen is soft  Tenderness: There is no abdominal tenderness  There is no guarding or rebound  Musculoskeletal: Normal range of motion  Skin:     General: Skin is warm and dry  Coloration: Skin is not pale  Findings: No erythema  Neurological:      General: No focal deficit present  Mental Status: He is alert and oriented to person, place, and time           Vital Signs  ED Triage Vitals [04/20/21 1523]   Temperature Pulse Respirations Blood Pressure SpO2   (!) 100 9 °F (38 3 °C) (!) 125 16 147/71 100 %      Temp Source Heart Rate Source Patient Position - Orthostatic VS BP Location FiO2 (%)   Oral Monitor Sitting Left arm --      Pain Score       8           Vitals:    04/20/21 1727 04/20/21 1817 04/20/21 2051 04/20/21 2100   BP: 121/81 129/77 142/81 131/73   Pulse: (!) 106 (!) 108 105 (!) 110   Patient Position - Orthostatic VS:  Sitting Lying Lying         Visual Acuity      ED Medications  Medications   amLODIPine (NORVASC) tablet 5 mg (has no administration in time range)   atorvastatin (LIPITOR) tablet 20 mg (has no administration in time range)   lisinopril (ZESTRIL) tablet 40 mg (has no administration in time range)   insulin glargine (LANTUS) subcutaneous injection 16 Units 0 16 mL (has no administration in time range)   levothyroxine tablet 200 mcg (has no administration in time range)   levothyroxine tablet 25 mcg (has no administration in time range)   sodium chloride 0 9 % infusion (125 mL/hr Intravenous New Bag 4/20/21 2048)   acetaminophen (TYLENOL) tablet 650 mg (has no administration in time range)   ondansetron (ZOFRAN) injection 4 mg (has no administration in time range)   insulin lispro (HumaLOG) 100 units/mL subcutaneous injection 1-6 Units (has no administration in time range)   insulin lispro (HumaLOG) 100 units/mL subcutaneous injection 1-6 Units (has no administration in time range)   cholecalciferol (VITAMIN D3) tablet 2,000 Units (has no administration in time range)   ascorbic acid (VITAMIN C) tablet 1,000 mg (1,000 mg Oral Given 4/20/21 2044)   zinc sulfate (ZINCATE) capsule 220 mg (has no administration in time range)     Followed by   multivitamin-minerals (CENTRUM ADULTS) tablet 1 tablet (has no administration in time range)   enoxaparin (LOVENOX) subcutaneous injection 30 mg (30 mg Subcutaneous Given 4/20/21 2047)   remdesivir (Veklury) 200 mg in sodium chloride 0 9 % 250 mL IVPB (has no administration in time range)     Followed by   remdesivir Radene Stack) 100 mg in sodium chloride 0 9 % 250 mL IVPB (has no administration in time range)   benzonatate (TESSALON PERLES) capsule 200 mg (200 mg Oral Given 4/20/21 2045)   sodium chloride 0 9 % bolus 1,000 mL (0 mL Intravenous Stopped 4/20/21 1730)   ceftriaxone (ROCEPHIN) 1 g/50 mL in dextrose IVPB (0 mg Intravenous Stopped 4/20/21 1730)   potassium chloride (K-DUR,KLOR-CON) CR tablet 40 mEq (40 mEq Oral Given 4/20/21 2045)       Diagnostic Studies  Results Reviewed     Procedure Component Value Units Date/Time    Procalcitonin with AM Reflex [[id number]]  (Normal) Collected: 04/20/21 1600    Lab Status: Final result Specimen: Blood from Arm, Left Updated: 04/20/21 2128     Procalcitonin <0 05 ng/ml     Procalcitonin Reflex [[id number]]     Lab Status: No result Specimen: Blood     COVID19, Influenza A/B, RSV PCR, [facility] [[id number]]  (Abnormal) Collected: 04/20/21 1604    Lab Status: Final result Specimen: Nares from Nasopharyngeal Swab Updated: 04/20/21 1905     SARS-CoV-2 Positive     INFLUENZA A PCR Negative     INFLUENZA B PCR Negative     RSV PCR Negative    Narrative: This test has been authorized by FDA under an EUA (Emergency Use Assay) for use by authorized laboratories  Clinical caution and judgement should be used with the interpretation of these results with consideration of the clinical impression and other laboratory testing  Testing reported as "Positive" or "Negative" has been proven to be accurate according to standard laboratory validation requirements  All testing is performed with control materials showing appropriate reactivity at standard intervals      Fingerstick Glucose (POCT) [[id number]]  (Normal) Collected: 04/20/21 1819    Lab Status: Final result Updated: 04/20/21 1821     POC Glucose 133 mg/dl     Comprehensive metabolic panel [[id number]]  (Abnormal) Collected: 04/20/21 1600    Lab Status: Edited Result - FINAL Specimen: Blood from Arm, Left Updated: 04/20/21 1806     Sodium 136 mmol/L      Potassium 3 3 mmol/L      Chloride 99 mmol/L      CO2 26 mmol/L      ANION GAP 11 mmol/L      BUN 21 mg/dL      Creatinine 0 99 mg/dL      Glucose 158 mg/dL      Calcium 7 8 mg/dL      AST 19 U/L      ALT 33 U/L      Alkaline Phosphatase 72 U/L      Total Protein 7 9 g/dL      Albumin 3 6 g/dL      Total Bilirubin 0 91 mg/dL      eGFR 91 ml/min/1 73sq m     Narrative:      National Kidney Disease Foundation guidelines for Chronic Kidney Disease (CKD):     Stage 1 with normal or high GFR (GFR > 90 mL/min/1 73 square meters)    Stage 2 Mild CKD (GFR = 60-89 mL/min/1 73 square meters)    Stage 3A Moderate CKD (GFR = 45-59 mL/min/1 73 square meters)    Stage 3B Moderate CKD (GFR = 30-44 mL/min/1 73 square meters)    Stage 4 Severe CKD (GFR = 15-29 mL/min/1 73 square meters)    Stage 5 End Stage CKD (GFR <15 mL/min/1 73 square meters)  Note: GFR calculation is accurate only with a steady state creatinine    NT-BNP PRO [[id number]]  (Normal) Collected: 04/20/21 1600    Lab Status: Final result Specimen: Blood from Arm, Left Updated: 04/20/21 1636     NT-proBNP 22 pg/mL     Troponin I [[id number]]  (Normal) Collected: 04/20/21 1600    Lab Status: Final result Specimen: Blood from Arm, Left Updated: 04/20/21 1633     Troponin I <0 02 ng/mL     Lactic acid [[id number]]  (Normal) Collected: 04/20/21 1600    Lab Status: Final result Specimen: Blood from Arm, Left Updated: 04/20/21 1632     LACTIC ACID 1 3 mmol/L     Narrative:      Result may be elevated if tourniquet was used during collection      D-Dimer [[id number]]  (Normal) Collected: 04/20/21 1600    Lab Status: Final result Specimen: Blood from Arm, Left Updated: 04/20/21 1624     D-Dimer, Quant 0 27 ug/ml FEU     CBC and differential [[id number]]  (Abnormal) Collected: 04/20/21 1600    Lab Status: Final result Specimen: Blood from Arm, Left Updated: 04/20/21 1623     WBC 2 03 Thousand/uL      RBC 5 08 Million/uL      Hemoglobin 14 9 g/dL      Hematocrit 43 1 %      MCV 85 fL      MCH 29 3 pg      MCHC 34 6 g/dL      RDW 12 4 %      MPV 9 2 fL      Platelets 085 Thousands/uL      nRBC 0 /100 WBCs      Neutrophils Relative 71 %      Immat GRANS % 1 %      Lymphocytes Relative 16 %      Monocytes Relative 11 %      Eosinophils Relative 0 %      Basophils Relative 1 %      Neutrophils Absolute 1 45 Thousands/µL      Immature Grans Absolute 0 01 Thousand/uL      Lymphocytes Absolute 0 33 Thousands/µL      Monocytes Absolute 0 23 Thousand/µL      Eosinophils Absolute 0 00 Thousand/µL      Basophils Absolute 0 01 Thousands/µL     Protime-INR [[id number]]  (Normal) Collected: 04/20/21 1600    Lab Status: Final result Specimen: Blood from Arm, Left Updated: 04/20/21 1621     Protime 13 8 seconds      INR 1 05    APTT [[id number]]  (Normal) Collected: 04/20/21 1600    Lab Status: Final result Specimen: Blood from Arm, Left Updated: 04/20/21 1621     PTT 28 seconds     Blood culture #2 [[id number]] Collected: 04/20/21 1601    Lab Status: In process Specimen: Blood from Arm, Right Updated: 04/20/21 1608    Blood culture #1 [[id number]] Collected: 04/20/21 1600    Lab Status: In process Specimen: Blood from Arm, Left Updated: 04/20/21 1608                 XR chest 1 view portable   ED Interpretation by Alfredo Rawls DO (04/20 1641)   Bilateral patchy ground-glass infiltrates, concerning/consistent with COVID      Final Result by Valeria Meehan DO (04/20 1654)      Bilateral groundglass opacities  Although nonspecific, findings are consistent with Covid-19 pneumonia in the appropriate setting  As per comments in EPIC, findings are concordant with preliminary interpretation provided by the emergency department                 Workstation performed: [id number]                    Procedures  ECG 12 Lead Documentation Only    Date/Time: 4/20/2021 4:04 PM  Performed by: Alfredo Rawls DO  Authorized by: Alfredo Rawls DO     ECG reviewed by me, the ED Provider: yes    Patient location:  ED  Previous ECG:     Previous ECG:  Compared to current    Comparison ECG info:  11 25 2010    Similarity:  No change  Interpretation:     Interpretation: non-specific    Rate:     ECG rate:  114    ECG rate assessment: tachycardic    Rhythm:     Rhythm: sinus rhythm    Ectopy:     Ectopy: none    QRS:     QRS axis:  Normal    QRS intervals:  Normal  Conduction:     Conduction: normal    ST segments:     ST segments:  Normal  T waves:     T waves: normal               ED Course                             SBIRT 22yo+      Most Recent Value   SBIRT (24 yo +)   In order to provide better care to our patients, we are screening all of our patients for alcohol and drug use  Would it be okay to ask you these screening questions?   Unable to answer at this time Filed at: 04/20/2021 1938                    [facility]  Number of Diagnoses or Management Options  Pneumonia due to COVID-19 virus: new and requires workup  Diagnosis management comments:       Initial ED assessment:  58-year-old male presents with fevers, chills, body aches    Initial DDx includes but is not limited to:   COVID, pneumonia, doubt intra-abdominal infection due to nontender abdomen, no symptoms to suggest UTI    Initial ED plan:   COVID testing, blood work, possible admission        Final ED summary/disposition:   After evaluation and workup in the emergency department, found have COVID pneumonia, admitted to hospital for further workup and treatment        Amount and/or Complexity of Data Reviewed  Clinical lab tests: ordered and reviewed  Tests in the radiology section of CPT®: ordered and reviewed  Review and summarize past medical records: yes  Independent visualization of images, tracings, or specimens: yes        Disposition  Final diagnoses:   Pneumonia due to COVID-19 virus     Time reflects when diagnosis was documented in both MDM as applicable and the Disposition within this note     Time User Action Codes Description Comment    4/20/2021  7:23 PM Елена Dc Add [U07 1,  J12 82] Pneumonia due to COVID-19 virus       ED Disposition     ED Disposition Condition Date/Time Comment    Admit Stable Tue Apr 20, 2021  7:23 PM Case was discussed with ERIN Houser and the patient's admission status was agreed to be Admission Status: inpatient status to the service of   [facility]        Follow-up Information    None         Patient's Medications   Discharge Prescriptions    No medications on file     No discharge procedures on file      PDMP Review     None          ED Provider  Electronically Signed by           Nilsa Diane DO  04/20/21 8845

## 2021-04-21 PROBLEM — D69.6 THROMBOCYTOPENIA (HCC): Status: ACTIVE | Noted: 2021-04-21

## 2021-04-21 PROBLEM — E78.5 HYPERLIPIDEMIA: Status: ACTIVE | Noted: 2021-04-21

## 2021-04-21 PROBLEM — D72.819 LEUKOPENIA: Status: ACTIVE | Noted: 2021-04-21

## 2021-04-21 PROBLEM — E87.6 HYPOKALEMIA: Status: RESOLVED | Noted: 2021-04-20 | Resolved: 2021-04-21

## 2021-04-21 LAB
ALBUMIN SERPL BCP-MCNC: 3 G/DL (ref 3.5–5)
ALP SERPL-CCNC: 67 U/L (ref 46–116)
ALT SERPL W P-5'-P-CCNC: 32 U/L (ref 12–78)
ANION GAP SERPL CALCULATED.3IONS-SCNC: 8 MMOL/L (ref 4–13)
AST SERPL W P-5'-P-CCNC: 18 U/L (ref 5–45)
ATRIAL RATE: 119 BPM
BASOPHILS # BLD AUTO: 0.01 THOUSANDS/ΜL (ref 0–0.1)
BASOPHILS NFR BLD AUTO: 0 % (ref 0–1)
BILIRUB SERPL-MCNC: 0.59 MG/DL (ref 0.2–1)
BUN SERPL-MCNC: 15 MG/DL (ref 5–25)
CALCIUM ALBUM COR SERPL-MCNC: 7.8 MG/DL (ref 8.3–10.1)
CALCIUM SERPL-MCNC: 7 MG/DL (ref 8.3–10.1)
CHLORIDE SERPL-SCNC: 105 MMOL/L (ref 100–108)
CO2 SERPL-SCNC: 25 MMOL/L (ref 21–32)
CREAT SERPL-MCNC: 0.86 MG/DL (ref 0.6–1.3)
CRP SERPL QL: 32.6 MG/L
EOSINOPHIL # BLD AUTO: 0 THOUSAND/ΜL (ref 0–0.61)
EOSINOPHIL NFR BLD AUTO: 0 % (ref 0–6)
ERYTHROCYTE [DISTWIDTH] IN BLOOD BY AUTOMATED COUNT: 12.5 % (ref 11.6–15.1)
FERRITIN SERPL-MCNC: 529 NG/ML (ref 8–388)
GFR SERPL CREATININE-BSD FRML MDRD: 104 ML/MIN/1.73SQ M
GLUCOSE SERPL-MCNC: 140 MG/DL (ref 65–140)
GLUCOSE SERPL-MCNC: 145 MG/DL (ref 65–140)
GLUCOSE SERPL-MCNC: 155 MG/DL (ref 65–140)
GLUCOSE SERPL-MCNC: 171 MG/DL (ref 65–140)
GLUCOSE SERPL-MCNC: 274 MG/DL (ref 65–140)
GLUCOSE SERPL-MCNC: 99 MG/DL (ref 65–140)
HCT VFR BLD AUTO: 40.2 % (ref 36.5–49.3)
HGB BLD-MCNC: 13.9 G/DL (ref 12–17)
IMM GRANULOCYTES # BLD AUTO: 0.01 THOUSAND/UL (ref 0–0.2)
IMM GRANULOCYTES NFR BLD AUTO: 0 % (ref 0–2)
LYMPHOCYTES # BLD AUTO: 0.68 THOUSANDS/ΜL (ref 0.6–4.47)
LYMPHOCYTES NFR BLD AUTO: 30 % (ref 14–44)
MCH RBC QN AUTO: 29.4 PG (ref 26.8–34.3)
MCHC RBC AUTO-ENTMCNC: 34.6 G/DL (ref 31.4–37.4)
MCV RBC AUTO: 85 FL (ref 82–98)
MONOCYTES # BLD AUTO: 0.22 THOUSAND/ΜL (ref 0.17–1.22)
MONOCYTES NFR BLD AUTO: 10 % (ref 4–12)
NEUTROPHILS # BLD AUTO: 1.36 THOUSANDS/ΜL (ref 1.85–7.62)
NEUTS SEG NFR BLD AUTO: 60 % (ref 43–75)
NRBC BLD AUTO-RTO: 0 /100 WBCS
P AXIS: 44 DEGREES
PLATELET # BLD AUTO: 113 THOUSANDS/UL (ref 149–390)
PMV BLD AUTO: 9 FL (ref 8.9–12.7)
POTASSIUM SERPL-SCNC: 3.8 MMOL/L (ref 3.5–5.3)
PR INTERVAL: 158 MS
PROT SERPL-MCNC: 6.8 G/DL (ref 6.4–8.2)
QRS AXIS: 88 DEGREES
QRSD INTERVAL: 78 MS
QT INTERVAL: 294 MS
QTC INTERVAL: 405 MS
RBC # BLD AUTO: 4.72 MILLION/UL (ref 3.88–5.62)
SODIUM SERPL-SCNC: 138 MMOL/L (ref 136–145)
T WAVE AXIS: 27 DEGREES
VENTRICULAR RATE: 114 BPM
WBC # BLD AUTO: 2.28 THOUSAND/UL (ref 4.31–10.16)

## 2021-04-21 PROCEDURE — 93010 ELECTROCARDIOGRAM REPORT: CPT | Performed by: INTERNAL MEDICINE

## 2021-04-21 PROCEDURE — 82728 ASSAY OF FERRITIN: CPT | Performed by: INTERNAL MEDICINE

## 2021-04-21 PROCEDURE — 99232 SBSQ HOSP IP/OBS MODERATE 35: CPT | Performed by: INTERNAL MEDICINE

## 2021-04-21 PROCEDURE — 82948 REAGENT STRIP/BLOOD GLUCOSE: CPT

## 2021-04-21 PROCEDURE — 86140 C-REACTIVE PROTEIN: CPT | Performed by: INTERNAL MEDICINE

## 2021-04-21 PROCEDURE — 85025 COMPLETE CBC W/AUTO DIFF WBC: CPT | Performed by: PHYSICIAN ASSISTANT

## 2021-04-21 PROCEDURE — 36415 COLL VENOUS BLD VENIPUNCTURE: CPT | Performed by: PHYSICIAN ASSISTANT

## 2021-04-21 PROCEDURE — 80053 COMPREHEN METABOLIC PANEL: CPT | Performed by: PHYSICIAN ASSISTANT

## 2021-04-21 RX ADMIN — AMLODIPINE BESYLATE 5 MG: 5 TABLET ORAL at 09:06

## 2021-04-21 RX ADMIN — BENZONATATE 200 MG: 100 CAPSULE ORAL at 16:33

## 2021-04-21 RX ADMIN — INSULIN LISPRO 4 UNITS: 100 INJECTION, SOLUTION INTRAVENOUS; SUBCUTANEOUS at 16:33

## 2021-04-21 RX ADMIN — ENOXAPARIN SODIUM 30 MG: 30 INJECTION SUBCUTANEOUS at 22:09

## 2021-04-21 RX ADMIN — ZINC SULFATE 220 MG (50 MG) CAPSULE 220 MG: CAPSULE at 09:06

## 2021-04-21 RX ADMIN — LISINOPRIL 40 MG: 20 TABLET ORAL at 09:06

## 2021-04-21 RX ADMIN — ENOXAPARIN SODIUM 30 MG: 30 INJECTION SUBCUTANEOUS at 09:05

## 2021-04-21 RX ADMIN — ATORVASTATIN CALCIUM 20 MG: 40 TABLET, FILM COATED ORAL at 22:09

## 2021-04-21 RX ADMIN — SODIUM CHLORIDE 125 ML/HR: 0.9 INJECTION, SOLUTION INTRAVENOUS at 22:10

## 2021-04-21 RX ADMIN — OXYCODONE HYDROCHLORIDE AND ACETAMINOPHEN 1000 MG: 500 TABLET ORAL at 09:06

## 2021-04-21 RX ADMIN — INSULIN GLARGINE 16 UNITS: 100 INJECTION, SOLUTION SUBCUTANEOUS at 09:05

## 2021-04-21 RX ADMIN — LEVOTHYROXINE SODIUM 25 MCG: 25 TABLET ORAL at 05:26

## 2021-04-21 RX ADMIN — REMDESIVIR 100 MG: 100 INJECTION, POWDER, LYOPHILIZED, FOR SOLUTION INTRAVENOUS at 22:10

## 2021-04-21 RX ADMIN — LEVOTHYROXINE SODIUM 200 MCG: 25 TABLET ORAL at 05:26

## 2021-04-21 RX ADMIN — OXYCODONE HYDROCHLORIDE AND ACETAMINOPHEN 1000 MG: 500 TABLET ORAL at 22:09

## 2021-04-21 RX ADMIN — ACETAMINOPHEN 650 MG: 325 TABLET, FILM COATED ORAL at 15:45

## 2021-04-21 RX ADMIN — SODIUM CHLORIDE 125 ML/HR: 0.9 INJECTION, SOLUTION INTRAVENOUS at 15:46

## 2021-04-21 RX ADMIN — Medication 2000 UNITS: at 09:06

## 2021-04-21 RX ADMIN — BENZONATATE 200 MG: 100 CAPSULE ORAL at 22:09

## 2021-04-21 RX ADMIN — ACETAMINOPHEN 650 MG: 325 TABLET, FILM COATED ORAL at 22:09

## 2021-04-21 RX ADMIN — INSULIN LISPRO 4 UNITS: 100 INJECTION, SOLUTION INTRAVENOUS; SUBCUTANEOUS at 12:44

## 2021-04-21 RX ADMIN — BENZONATATE 200 MG: 100 CAPSULE ORAL at 09:06

## 2021-04-21 NOTE — ED NOTES
Charge RN [id number] made aware of pts transport to room, enquired about receiving RN number for report        Lisbet Torres RN  04/21/21 [id number]

## 2021-04-21 NOTE — UTILIZATION REVIEW
Initial Clinical Review    Admission: Date/Time/Statement:   Admission Orders (From admission, onward)     Ordered        04/20/21 1923  Inpatient Admission  Once                   Orders Placed This Encounter   Procedures    Inpatient Admission     Standing Status:   Standing     Number of Occurrences:   1     Order Specific Question:   Level of Care     Answer:   Med Surg [16]     Order Specific Question:   Bed request comments     Answer:   COVID positive     Order Specific Question:   Estimated length of stay     Answer:   More than 2 Midnights     Order Specific Question:   Certification     Answer:   I certify that inpatient services are medically necessary for this patient for a duration of greater than two midnights  See H&P and MD Progress Notes for additional information about the patient's course of treatment  ED Arrival Information     Expected Arrival Acuity Means of Arrival Escorted By Service Admission Type    - 4/20/2021 15:20 Urgent Walk-In Self General Medicine Urgent    Arrival Complaint    [facility]        Chief Complaint   Patient presents with    Fever - 9 weeks to 74 years     pt reprts fatigue fever and cough for a few days       Initial Presentation: This is a 54year old male from home to ED admitted inpatient due to pneumonia due to Debra Organ 19 virus/Viral Sepsis  Presented due to fever and cough starting about 3 days prior to arrival with associated chills, confusion, myalgias and nausea  On exam appears uncomfortable  Tachycardic  COVID 19 +    K 3 3  Wbc 2 03  Platelets 523  CxR - Covid pneumonia  Plan is start mild pathway:  Remdesivir, vitamin cocktail, tessalon, monitor inflammatory markers  Monitor off antibiotics  Replete K and trend BMP  IVF in progress    Date:  4/21/2021    Day 2:  Has back, neck and head pain  7/10 on scale, states neck feels stiff  Has ongoing weakness and fatigue  On exam mild posterior neck stiffness, full ROM    Lungs mildly diminished at bases  Glucose elevated  Oxygen wean from 3 liters to room air  To continue Remdesivir, vitamin therapy  To continue basal insulin and accu checks qid with SSI, add prandial insulin  ED Triage Vitals [04/20/21 1523]   Temperature Pulse Respirations Blood Pressure SpO2   (!) 100 9 °F (38 3 °C) (!) 125 16 147/71 100 %      Temp Source Heart Rate Source Patient Position - Orthostatic VS BP Location FiO2 (%)   Oral Monitor Sitting Left arm --      Pain Score       8          Wt Readings from Last 1 Encounters:   04/19/21 90 7 kg (200 lb)     Additional Vital Signs:   04/21/21 0755  98 7 °F (37 1 °C)  92  18  132/83  --  94 %  --  --  None (Room air)  Lying   04/21/21 0511  99 3 °F (37 4 °C)  --  --  --  --  --  --  --  --  --   04/21/21 0415  --  90  17  125/88  --  95 %  32  3 L/min  Nasal cannula  Lying   04/21/21 0200  --  82  18  112/62  80  94 %  32  3 L/min  Nasal cannula  Lying   04/21/21 0000  --  88  18  130/77  96  94 %  28  2 L/min  Nasal cannula  Lying   04/20/21 2215  --  102  20  127/73  92  96 %  --  --  None (Room air)  Lying   04/20/21 2100  --  110Abnormal   20  131/73  94  95 %  --  --  None (Room air)  Lying   04/20/21 2051  --  105  20  142/81  --  95 %  --  --  None (Room air)  Lying   04/20/21 1817  --  108Abnormal   22  129/77  96  96 %  --  --  None (Room air)  Sitting     04/21/21 1500  99 1 °F (37 3 °C)  100  20  132/78  --  98 %  --  --  None (Room air)         Pertinent Labs/Diagnostic Test Results:   4/20/2021 CxR -  Bilateral groundglass opacities  Although nonspecific, findings are consistent with Covid-19 pneumonia in the appropriate setting     4/20/2021 ECG - sinus tachycardia    Normal ST and T  Results from last 7 days   Lab Units 04/20/21  1604   SARS-COV-2  Positive*     Results from last 7 days   Lab Units 04/21/21  0411 04/20/21  1600   WBC Thousand/uL 2 28* 2 03*   HEMOGLOBIN g/dL 13 9 14 9   HEMATOCRIT % 40 2 43 1   PLATELETS Thousands/uL 113* 136* NEUTROS ABS Thousands/µL 1 36* 1 45*     Results from last 7 days   Lab Units 04/21/21  0411 04/20/21  1600   SODIUM mmol/L 138 136   POTASSIUM mmol/L 3 8 3 3*   CHLORIDE mmol/L 105 99*   CO2 mmol/L 25 26   ANION GAP mmol/L 8 11   BUN mg/dL 15 21   CREATININE mg/dL 0 86 0 99   EGFR ml/min/1 73sq m 104 91   CALCIUM mg/dL 7 0* 7 8*     Results from last 7 days   Lab Units 04/21/21  0411 04/20/21  1600   AST U/L 18 19   ALT U/L 32 33   ALK PHOS U/L 67 72   TOTAL PROTEIN g/dL 6 8 7 9   ALBUMIN g/dL 3 0* 3 6   TOTAL BILIRUBIN mg/dL 0 59 0 91     Results from last 7 days   Lab Units 04/21/21  0748 04/20/21  2208 04/20/21  1819   POC GLUCOSE mg/dl 145* 217* 133     Results from last 7 days   Lab Units 04/21/21  0411 04/20/21  1600   GLUCOSE RANDOM mg/dL 155* 158*     Results from last 7 days   Lab Units 04/20/21  1600   TROPONIN I ng/mL <0 02     Results from last 7 days   Lab Units 04/20/21  1600   D-DIMER QUANTITATIVE ug/ml FEU 0 27     Results from last 7 days   Lab Units 04/20/21  1600   PROTIME seconds 13 8   INR  1 05   PTT seconds 28     Results from last 7 days   Lab Units 04/20/21  1600   PROCALCITONIN ng/ml <0 05     Results from last 7 days   Lab Units 04/20/21  1600   LACTIC ACID mmol/L 1 3     Results from last 7 days   Lab Units 04/20/21  1600   NT-PRO BNP pg/mL 22     Results from last 7 days   Lab Units 04/20/21  1604   INFLUENZA A PCR  Negative   INFLUENZA B PCR  Negative   RSV PCR  Negative     Results from last 7 days   Lab Units 04/20/21  1601 04/20/21  1600   BLOOD CULTURE  Received in Microbiology Lab  Culture in Progress  Received in Microbiology Lab  Culture in Progress       ED Treatment:   Medication Administration from 04/20/2021 1519 to 04/21/2021 0813       Date/Time Order Dose Route Action Comments     04/20/2021 1606 sodium chloride 0 9 % bolus 1,000 mL 1,000 mL Intravenous New Bag      04/20/2021 1622 ceftriaxone (ROCEPHIN) 1 g/50 mL in dextrose IVPB 1,000 mg Intravenous New Bag 04/20/2021 2212 atorvastatin (LIPITOR) tablet 20 mg 20 mg Oral Given      04/21/2021 0526 levothyroxine tablet 200 mcg 200 mcg Oral Given      04/21/2021 0526 levothyroxine tablet 25 mcg 25 mcg Oral Given      04/20/2021 2048 sodium chloride 0 9 % infusion 125 mL/hr Intravenous New Bag      04/20/2021 2211 insulin lispro (HumaLOG) 100 units/mL subcutaneous injection 1-6 Units 2 Units Subcutaneous Given      04/20/2021 2044 ascorbic acid (VITAMIN C) tablet 1,000 mg 1,000 mg Oral Given      04/20/2021 2047 enoxaparin (LOVENOX) subcutaneous injection 30 mg 30 mg Subcutaneous Given      04/20/2021 2209 remdesivir (Veklury) 200 mg in sodium chloride 0 9 % 250 mL IVPB 200 mg Intravenous New Bag      04/20/2021 2045 benzonatate (TESSALON PERLES) capsule 200 mg 200 mg Oral Given      04/20/2021 2045 potassium chloride (K-DUR,KLOR-CON) CR tablet 40 mEq 40 mEq Oral Given         Past Medical History:   Diagnosis Date    Cancer ([facility] )     Thyroid cancer    Diabetes mellitus ([facility] )     Disease of thyroid gland     Hyperlipidemia     Hypertension      Present on Admission:   Pneumonia due to COVID-19 virus   Viral sepsis ([facility] )   Postoperative hypothyroidism   Hypokalemia      Admitting Diagnosis: Fever [R50 9]  Pneumonia due to COVID-19 virus [U07 1, J12 82]  Age/Sex: 54 y o  male  Admission Orders:  4/20/2021 1923 inpatient   Scheduled Medications:  amLODIPine, 5 mg, Oral, Daily  ascorbic acid, 1,000 mg, Oral, Q12H [facility]  atorvastatin, 20 mg, Oral, HS  benzonatate, 200 mg, Oral, TID  cholecalciferol, 2,000 Units, Oral, Daily  enoxaparin, 30 mg, Subcutaneous, Q12H JOLANTA  insulin glargine, 16 Units, Subcutaneous, QAM  insulin lispro, 1-6 Units, Subcutaneous, TID AC  insulin lispro, 1-6 Units, Subcutaneous, HS  levothyroxine, 200 mcg, Oral, Early Morning  levothyroxine, 25 mcg, Oral, Early Morning  lisinopril, 40 mg, Oral, Daily  zinc sulfate, 220 mg, Oral, Daily    Followed by  [START ON 4/28/2021] multivitamin-minerals, 1 tablet, Oral, Daily  remdesivir, 100 mg, Intravenous, Q24H      Continuous IV Infusions:  sodium chloride, 125 mL/hr, Intravenous, Continuous      PRN Meds: not used   acetaminophen, 650 mg, Oral, Q6H PRN - used x 2    ondansetron, 4 mg, Intravenous, Q6H PRN    Contact and airborne isolation  Network Utilization Review Department  ATTENTION: Please call with any questions or concerns to [phone] and carefully listen to the prompts so that you are directed to the right person  All voicemails are confidential   Clayton Santiago all requests for admission clinical reviews, approved or denied determinations and any other requests to dedicated fax number below belonging to the campus where the patient is receiving treatment   List of dedicated fax numbers for the Facilities:  [address] DENIALS (Administrative/Medical Necessity) [phone]   [address] (Maternity/NICU/Pediatrics) [phone]   [address] [place] [id number] [address] [facility] [phone]

## 2021-04-21 NOTE — ASSESSMENT & PLAN NOTE
· POA, COVID positive in ED, GGO noted on CXR  Viral sepsis noted on admission  Doubt bacterial infection   Dimer negative    · Admit patient to med/surg under inpatient status   · Start Mild Pathway   · Remdesivir  · Vitamin cocktail   · Tessalon TID   · Trend procalcitonin   · Monitor inflammatory markers   · CBC, CMP daily

## 2021-04-21 NOTE — ASSESSMENT & PLAN NOTE
Lab Results   Component Value Date    HGBA1C 9 5 (H) 08/31/2020     Continue basal insulin additional SSI coverage per Accu-Cheks - add next/prandial insulin for optimized control  Carbohydrate restricted diet

## 2021-04-21 NOTE — ED NOTES
Pt given sandwich box  No complaints at this time   Will continue to monitor     Ward Teran, OSCAR  04/20/21 2056

## 2021-04-21 NOTE — ED NOTES
Pt ambulates from restroom to room on room air for approx 10 mins    Pt reports "breathing feels good"  RA O2 saturation 95%     Gerardo Root RN  04/21/21 9047

## 2021-04-21 NOTE — ASSESSMENT & PLAN NOTE
Initially requiring up to 3 L of oxygen via nasal cannula -> weaned down to baseline room air today  C/w Remdesivir - c/w zinc/multivitamin regimen - c/w vitamin C/D course  Procalcitonin and D-dimer normal - ferritin/CRP elevated (trend)  Supportive care otherwise - maintain airborne/contact precautions  With continued clinical stability, anticipate discharge in 24-48 hours

## 2021-04-21 NOTE — ED NOTES
Pt called and ordered breakfast, requested to brush teeth  Pt ambulatory to restroom with own supplies        Jacob Rodriguez RN  04/21/21 6294

## 2021-04-21 NOTE — PROGRESS NOTES
[facility]  Progress Note - José uLis Henderson 1975, 54 y o  male MRN: [id number]  Unit/Bed#: S -01 Encounter: [id number]  Primary Care Provider: Jo Maldonado PA-C   Date and time admitted to hospital: 4/20/2021  3:28 PM      * Pneumonia due to COVID-19 virus  Assessment & Plan  Initially requiring up to 3 L of oxygen via nasal cannula -> weaned down to baseline room air today  C/w Remdesivir - c/w zinc/multivitamin regimen - c/w vitamin C/D course  Procalcitonin and D-dimer normal - ferritin/CRP elevated (trend)  Supportive care otherwise - maintain airborne/contact precautions  With continued clinical stability, anticipate discharge in 24-48 hours    Insulin dependent diabetes mellitus  Assessment & Plan  Lab Results   Component Value Date    HGBA1C 9 5 (H) 08/31/2020     Continue basal insulin additional SSI coverage per Accu-Cheks - add next/prandial insulin for optimized control  Carbohydrate restricted diet    Essential hypertension  Assessment & Plan  Continue Norvasc/Zestril    Hyperlipidemia  Assessment & Plan  Continue Lipitor    Postoperative hypothyroidism  Assessment & Plan  S/p thyroidectomy  Continue Synthroid replacement    Hypokalemia-resolved as of 4/21/2021  Assessment & Plan  Serum potassium normalized status post repletion    Leukopenia  Assessment & Plan  Chronic issue   Monitor WBC count    Thrombocytopenia   Assessment & Plan  Monitor platelet count - monitor for bleeding      DVT Prophylaxis:  Lovenox       Patient Centered Rounds:  I have performed bedside rounds and discussed plan of care with nursing today  Discussions with Specialists or Other Care Team Provider:  see above assessments if applicable    Education and Discussions with Family / Patient:  Patient at bedside    Time Spent for Care:  32 minutes  More than 50% of total time spent on counseling and coordination of care as described above      Current Length of Stay: 1 day(s)    Current Patient Status: Inpatient   Certification Statement:  Patient will continue to require additional hospital stay due to assessments as noted above  Code Status: Level 1 - Full Code        Subjective:     Seen/examined earlier in the day  Per nursing, patient was weaned off oxygen in the morning  He is currently maintained on room air at rest   Upon my encounter, he complains of intermittent neck stiffness, however, denies worsening shortness of breath at this time  Still remains weak/fatigued  Objective:     Vitals:   Temp (24hrs), Av °F (37 2 °C), Min:98 7 °F (37 1 °C), Max:99 3 °F (37 4 °C)    Temp:  [98 7 °F (37 1 °C)-99 3 °F (37 4 °C)] 99 1 °F (37 3 °C)  HR:  [] 100  Resp:  [17-20] 20  BP: (112-142)/(62-88) 132/78  SpO2:  [94 %-98 %] 98 %  There is no height or weight on file to calculate BMI  Input and Output Summary (last 24 hours):        Intake/Output Summary (Last 24 hours) at 2021 7260  Last data filed at 2021 1300  Gross per 24 hour   Intake 730 ml   Output 0 ml   Net 730 ml       Physical Exam:     GENERAL:  Well-developed/nourished - no immediate distress at rest  HEAD:  Normocephalic - atraumatic  EYES: PERRL - EOMI   MOUTH:  Mucosa moist  NECK:  Supple - full range of motion - mild posterior neck stiffness  CARDIAC:  Rate controlled - S1/S2 positive  PULMONARY:  Mildly diminished at the bases - nonlabored respirations  ABDOMEN:  Soft - nontender/nondistended - active bowel sounds  MUSCULOSKELETAL:  Motor strength/range of motion intact  NEUROLOGIC:  Alert/oriented at baseline  SKIN:  Chronic wrinkles/blemishes   PSYCHIATRIC:  Mood/affect stable      Additional Data:     Labs & Recent Cultures:    Results from last 7 days   Lab Units 211   WBC Thousand/uL 2 28*   HEMOGLOBIN g/dL 13 9   HEMATOCRIT % 40 2   PLATELETS Thousands/uL 113*   NEUTROS PCT % 60   LYMPHS PCT % 30   MONOS PCT % 10   EOS PCT % 0     Results from last 7 days   Lab Units 21 POTASSIUM mmol/L 3 8   CHLORIDE mmol/L 105   CO2 mmol/L 25   BUN mg/dL 15   CREATININE mg/dL 0 86   CALCIUM mg/dL 7 0*   ALK PHOS U/L 67   ALT U/L 32   AST U/L 18     Results from last 7 days   Lab Units 04/20/21  1600   INR  1 05     Results from last 7 days   Lab Units 04/21/21  1804 04/21/21  1558 04/21/21  1053 04/21/21  0748 04/20/21  2208 04/20/21  1819   POC GLUCOSE mg/dl 99 140 274* 145* 217* 133         Results from last 7 days   Lab Units 04/20/21  1600   LACTIC ACID mmol/L 1 3   PROCALCITONIN ng/ml <0 05         Results from last 7 days   Lab Units 04/20/21  1601 04/20/21  1600   BLOOD CULTURE  Received in Microbiology Lab  Culture in Progress  Received in Microbiology Lab  Culture in Progress           Last 24 Hours Medication List:   Current Facility-Administered Medications   Medication Dose Route Frequency Provider Last Rate    acetaminophen  650 mg Oral Q6H PRN Mik Guevara, PA-C      amLODIPine  5 mg Oral Daily Mik Guevara, PA-C      ascorbic acid  1,000 mg Oral Q12H [facility] Mik Guevara, PA-C      atorvastatin  20 mg Oral HS Melda Marek, PA-C      benzonatate  200 mg Oral TID Melda Silversmith, PA-C      cholecalciferol  2,000 Units Oral Daily Mik Guevara, PA-C      enoxaparin  30 mg Subcutaneous Q12H [facility] Mik Guevara, PA-C      insulin glargine  16 Units Subcutaneous QAM Mik Guevara, PA-C      insulin lispro  1-6 Units Subcutaneous HS Mik Guevara, PA-C      insulin lispro  1-6 Units Subcutaneous TID AC Denae Christina MD     Ned Abt insulin lispro  4 Units Subcutaneous TID With Meals Denae Christina MD      levothyroxine  200 mcg Oral Early Morning Mik Guevara, PA-C      levothyroxine  25 mcg Oral Early Morning Mik Guevara, PA-C      lisinopril  40 mg Oral Daily Mik Guevara, PA-C      zinc sulfate  220 mg Oral Daily Mik Hernandez PA-C      Followed by   Magdalene Campbell ON 4/28/2021] multivitamin-minerals  1 tablet Oral Daily Mik Hernandez PA-C      ondansetron  4 mg Intravenous Q6H PRN Brittany Enamorado PA-C      remdesivir  100 mg Intravenous Q24H Veronda Kelsea, PA-C      sodium chloride  125 mL/hr Intravenous Continuous Veronda Tristanet, PA-C 125 mL/hr (04/21/21 2125)                ** Please Note: This note is constructed using a voice recognition dictation system  An occasional wrong word/phrase or sound-a-like substitution may have been picked up by dictation device due to the inherent limitations of voice recognition software  Read the chart carefully and recognize, using reasonable context, where substitutions may have occurred  **

## 2021-04-21 NOTE — H&P
Elen[facility]  H&P- Ann Marie Lema 1975, 54 y o  male MRN: [id number]  Unit/Bed#: FT 02 Encounter: [id number]  Primary Care Provider: Patricia Gonsalves PA-C   Date and time admitted to hospital: 4/20/2021  3:28 PM    * Pneumonia due to COVID-19 virus  Assessment & Plan  · POA, COVID positive in ED, GGO noted on CXR  Viral sepsis noted on admission  Doubt bacterial infection  Dimer negative    · Admit patient to med/surg under inpatient status   · Start Mild Pathway   · Remdesivir  · Vitamin cocktail   · Tessalon TID   · Trend procalcitonin   · Monitor inflammatory markers   · CBC, CMP daily     Viral sepsis (HCC)  Assessment & Plan  · POA, evidenced by tachycardia, tachypnea, fever, and leukopenia  Secondary to COVID-19   · Plan as above   · Monitor off antibiotics     Hypokalemia  Assessment & Plan  · Noted at 3 3  · Give 40 mEq PO once   · BMP in AM     Type 2 diabetes mellitus with hyperglycemia, with long-term current use of insulin (HCC)  Assessment & Plan  Lab Results   Component Value Date    HGBA1C 9 5 (H) 08/31/2020       Recent Labs     04/20/21  1819   POCGLU 133       Blood Sugar Average: Last 72 hrs:  (P) 133   · BG acceptable on admission   · Continue insulin - subbed with Lantus while here   · SSI algorithm ordered   · QID accuchecks  · Hold Metformin     Essential hypertension  Assessment & Plan  · BP acceptable   · Continue home regimen     Postoperative hypothyroidism  Assessment & Plan  · Continue Levothyroxine      VTE Prophylaxis: Enoxaparin (Lovenox)  / reason for no mechanical VTE prophylaxis None needed as per VTE protocol    Code Status: Full Code   POLST: POLST form is not discussed and not completed at this time  Discussion with family: None at bedside     Anticipated Length of Stay:  Patient will be admitted on an Inpatient basis with an anticipated length of stay of  Greater than 2 midnights     Justification for Hospital Stay: As per above assessment and plan Total Time for Visit, including Counseling / Coordination of Care: 1 hour  Greater than 50% of this total time spent on direct patient counseling and coordination of care  Chief Complaint:   Cough    History of Present Illness:    Catarino Saxena is a 54 y o  male with a history of T2DM on insulin, HTN, hypothyroid, and HLD who presents with cough  Patient reports symptoms for last few days  He reports intermittent fevers at home, but did not check his temperature to come to the emergency department  He stated that his temperature was a 109°, however his temperature is actually 100 9° F  Patient denies shortness of breath or wheezing  Denies chest pain  He denies nausea or vomiting  Patient does state that he is very hungry and has not eaten all day  Patient is to being a diabetic and reports that he is on insulin at home  He reports compliance with his rest of his medications  He denies receiving COVID vaccine, but he states that he was planning on getting it  Review of Systems:    Review of Systems   Constitutional: Positive for fever  Negative for appetite change, chills, diaphoresis and fatigue  HENT: Negative for congestion, rhinorrhea and sore throat  Eyes: Negative for visual disturbance  Respiratory: Positive for cough  Negative for chest tightness, shortness of breath and wheezing  Cardiovascular: Negative for chest pain, palpitations and leg swelling  Gastrointestinal: Negative for abdominal pain, constipation, diarrhea, nausea and vomiting  Genitourinary: Negative for dysuria  Musculoskeletal: Negative for arthralgias and myalgias  Neurological: Negative for dizziness, syncope, weakness, light-headedness, numbness and headaches  All other systems reviewed and are negative        Past Medical and Surgical History:     Past Medical History:   Diagnosis Date    Cancer [facility])     Thyroid cancer    Diabetes mellitus ([facility] [address] )     Disease of thyroid gland     Hyperlipidemia  Hypertension        Past Surgical History:   Procedure Laterality Date    NECK SURGERY  04/21/2017       Meds/Allergies:    Prior to Admission medications    Medication Sig Start Date End Date Taking? Authorizing Provider   glucose blood test strip test blood sugar twice daily or prn 1/14/16  Yes Historical Provider, MD   levothyroxine 200 mcg tablet Take 200 mcg by mouth daily 3/20/19  Yes Historical Provider, MD   levothyroxine 25 mcg tablet Take 25 mcg by mouth daily   Yes Historical Provider, MD   metFORMIN (GLUCOPHAGE) 1000 MG tablet Take 1 tablet (1,000 mg total) by mouth 2 (two) times a day with meals 3/19/20  Yes Mariajose Baird PA-C   amLODIPine (NORVASC) 5 mg tablet  4/4/19   Historical Provider, MD   atorvastatin (LIPITOR) 20 mg tablet Take 20 mg by mouth daily at bedtime 3/20/19   Historical Provider, MD   benazepril (LOTENSIN) 40 MG tablet  4/4/19   Historical Provider, MD   clobetasol (TEMOVATE) 0 05 % cream APPLY UP TO TWICE DAILY TO SKIN OR ECZEMA 11/13/19   Historical Provider, MD   Ergocalciferol (VITAMIN D2 PO) take 1 capsule by oral route  every week    Historical Provider, MD   hydrocortisone 1 % lotion APPLY AT BEDTIME 3/22/19   Historical Provider, MD   Insulin Degludec-Liraglutide (XULTOPHY) 100 units-3 6 mg/mL injection pen inject (16UNITS) every morning; may increase by 2u every 3 days until B is below 120 in am    Historical Provider, MD   Insulin Pen Needle (PEN NEEDLES) 32G X 4 MM MISC inject once daily as directed 4/26/17   Historical Provider, MD   QUEtiapine (SEROquel) 50 mg tablet Take 1 tablet (50 mg total) by mouth daily at bedtime  Patient not taking: Reported on 11/30/2020 3/19/20   Mariajose Baird PA-C     I have reviewed home medications with patient personally  Allergies:    Allergies   Allergen Reactions    No Active Allergies        Social History:     Marital Status: /Civil Union   Occupation: Noncontributory   Patient Pre-hospital Living Situation: Home  Patient Pre-hospital Level of Mobility: Full  Patient Pre-hospital Diet Restrictions: None  Substance Use History:   Social History     Substance and Sexual Activity   Alcohol Use Never    Frequency: Never     Social History     Tobacco Use   Smoking Status Never Smoker   Smokeless Tobacco Never Used     Social History     Substance and Sexual Activity   Drug Use No       Family History:    Family History   Problem Relation Age of Onset    Diabetes type II Mother         MELLITUS    COPD Father     Cancer Father         MB 2/4/16    LYMPHOMA CANCER       Physical Exam:     Vitals:   Blood Pressure: 129/77 (04/20/21 1817)  Pulse: (!) 108 (04/20/21 1817)  Temperature: (!) 100 9 °F (38 3 °C) (04/20/21 1523)  Temp Source: Oral (04/20/21 1523)  Respirations: 22 (04/20/21 1817)  SpO2: 96 % (04/20/21 1817)    Physical Exam  Constitutional:       General: He is in acute distress  Appearance: Normal appearance  He is normal weight  He is ill-appearing  He is not diaphoretic  HENT:      Head: Normocephalic and atraumatic  Mouth/Throat:      Mouth: Mucous membranes are dry  Eyes:      General: No scleral icterus  Pupils: Pupils are equal, round, and reactive to light  Cardiovascular:      Rate and Rhythm: Regular rhythm  Tachycardia present  Pulses: Normal pulses  Heart sounds: Normal heart sounds, S1 normal and S2 normal  No murmur  No systolic murmur  No diastolic murmur  No gallop  No S3 or S4 sounds  Pulmonary:      Effort: Pulmonary effort is normal  No accessory muscle usage or respiratory distress  Breath sounds: Normal breath sounds  No stridor  No decreased breath sounds, wheezing, rhonchi or rales  Chest:      Chest wall: No tenderness  Abdominal:      General: Bowel sounds are normal  There is no distension  Palpations: Abdomen is soft  Tenderness: There is no abdominal tenderness  There is no guarding  Musculoskeletal:      Right lower leg: No edema        Left lower leg: No edema  Skin:     General: Skin is warm and dry  Coloration: Skin is not jaundiced  Neurological:      General: No focal deficit present  Mental Status: He is alert  Mental status is at baseline  Motor: No tremor or seizure activity  Psychiatric:         Behavior: Behavior is cooperative  Additional Data:     Lab Results: I have personally reviewed pertinent reports  Results from last 7 days   Lab Units 04/20/21  1600   WBC Thousand/uL 2 03*   HEMOGLOBIN g/dL 14 9   HEMATOCRIT % 43 1   PLATELETS Thousands/uL 136*   NEUTROS PCT % 71   LYMPHS PCT % 16   MONOS PCT % 11   EOS PCT % 0     Results from last 7 days   Lab Units 04/20/21  1600   SODIUM mmol/L 136   POTASSIUM mmol/L 3 3*   CHLORIDE mmol/L 99*   CO2 mmol/L 26   BUN mg/dL 21   CREATININE mg/dL 0 99   ANION GAP mmol/L 11   CALCIUM mg/dL 7 8*   ALBUMIN g/dL 3 6   TOTAL BILIRUBIN mg/dL 0 91   ALK PHOS U/L 72   ALT U/L 33   AST U/L 19   GLUCOSE RANDOM mg/dL 158*     Results from last 7 days   Lab Units 04/20/21  1600   INR  1 05     Results from last 7 days   Lab Units 04/20/21  1819   POC GLUCOSE mg/dl 133         Results from last 7 days   Lab Units 04/20/21  1600   LACTIC ACID mmol/L 1 3       Imaging: I have personally reviewed pertinent reports  XR chest 1 view portable   ED Interpretation by Horace Kumar DO (04/20 1641)   Bilateral patchy ground-glass infiltrates, concerning/consistent with COVID      Final Result by Isaac Isidro DO (04/20 1654)      Bilateral groundglass opacities  Although nonspecific, findings are consistent with Covid-19 pneumonia in the appropriate setting  As per comments in EPIC, findings are concordant with preliminary interpretation provided by the emergency department  Workstation performed: [id number]             EKG, Pathology, and Other Studies Reviewed on Admission:   · EKG: Sinus Tachycardia, 114 BPM  · CXR:  Bilateral ground-glass opacities    Findings are consistent with COVID-19 pneumonia in the appropriate setting  Allscripts / Epic Records Reviewed: Yes     ** Please Note: This note has been constructed using a voice recognition system   **

## 2021-04-21 NOTE — ASSESSMENT & PLAN NOTE
Lab Results   Component Value Date    HGBA1C 9 5 (H) 08/31/2020       Recent Labs     04/20/21  1819   POCGLU 133       Blood Sugar Average: Last 72 hrs:  (P) 133   · BG acceptable on admission   · Continue insulin - subbed with Lantus while here   · SSI algorithm ordered   · QID accuchecks  · Hold Metformin

## 2021-04-22 VITALS
TEMPERATURE: 98.4 F | DIASTOLIC BLOOD PRESSURE: 77 MMHG | SYSTOLIC BLOOD PRESSURE: 140 MMHG | OXYGEN SATURATION: 95 % | RESPIRATION RATE: 20 BRPM | HEART RATE: 90 BPM

## 2021-04-22 LAB
BASOPHILS # BLD AUTO: 0.01 THOUSANDS/ΜL (ref 0–0.1)
BASOPHILS NFR BLD AUTO: 1 % (ref 0–1)
D DIMER PPP FEU-MCNC: 0.28 UG/ML FEU
EOSINOPHIL # BLD AUTO: 0 THOUSAND/ΜL (ref 0–0.61)
EOSINOPHIL NFR BLD AUTO: 0 % (ref 0–6)
ERYTHROCYTE [DISTWIDTH] IN BLOOD BY AUTOMATED COUNT: 12.4 % (ref 11.6–15.1)
GLUCOSE SERPL-MCNC: 126 MG/DL (ref 65–140)
GLUCOSE SERPL-MCNC: 175 MG/DL (ref 65–140)
GLUCOSE SERPL-MCNC: 89 MG/DL (ref 65–140)
HCT VFR BLD AUTO: 39.8 % (ref 36.5–49.3)
HGB BLD-MCNC: 13.9 G/DL (ref 12–17)
IMM GRANULOCYTES # BLD AUTO: 0.01 THOUSAND/UL (ref 0–0.2)
IMM GRANULOCYTES NFR BLD AUTO: 1 % (ref 0–2)
LYMPHOCYTES # BLD AUTO: 0.5 THOUSANDS/ΜL (ref 0.6–4.47)
LYMPHOCYTES NFR BLD AUTO: 28 % (ref 14–44)
MCH RBC QN AUTO: 29.4 PG (ref 26.8–34.3)
MCHC RBC AUTO-ENTMCNC: 34.9 G/DL (ref 31.4–37.4)
MCV RBC AUTO: 84 FL (ref 82–98)
MONOCYTES # BLD AUTO: 0.14 THOUSAND/ΜL (ref 0.17–1.22)
MONOCYTES NFR BLD AUTO: 8 % (ref 4–12)
NEUTROPHILS # BLD AUTO: 1.1 THOUSANDS/ΜL (ref 1.85–7.62)
NEUTS SEG NFR BLD AUTO: 62 % (ref 43–75)
NRBC BLD AUTO-RTO: 0 /100 WBCS
PLATELET # BLD AUTO: 126 THOUSANDS/UL (ref 149–390)
PMV BLD AUTO: 9.4 FL (ref 8.9–12.7)
RBC # BLD AUTO: 4.72 MILLION/UL (ref 3.88–5.62)
WBC # BLD AUTO: 1.83 THOUSAND/UL (ref 4.31–10.16)

## 2021-04-22 PROCEDURE — 85379 FIBRIN DEGRADATION QUANT: CPT | Performed by: PHYSICIAN ASSISTANT

## 2021-04-22 PROCEDURE — 99239 HOSP IP/OBS DSCHRG MGMT >30: CPT | Performed by: INTERNAL MEDICINE

## 2021-04-22 PROCEDURE — 85025 COMPLETE CBC W/AUTO DIFF WBC: CPT | Performed by: INTERNAL MEDICINE

## 2021-04-22 PROCEDURE — 82948 REAGENT STRIP/BLOOD GLUCOSE: CPT

## 2021-04-22 RX ORDER — ZINC SULFATE 50(220)MG
220 CAPSULE ORAL DAILY
Qty: 5 CAPSULE | Refills: 0 | Status: SHIPPED | OUTPATIENT
Start: 2021-04-23 | End: 2021-04-28

## 2021-04-22 RX ORDER — MULTIVITAMIN/IRON/FOLIC ACID 18MG-0.4MG
1 TABLET ORAL DAILY
Qty: 30 TABLET | Refills: 0 | Status: SHIPPED | OUTPATIENT
Start: 2021-04-28

## 2021-04-22 RX ORDER — MELATONIN
2000 DAILY
Qty: 20 TABLET | Refills: 0 | Status: SHIPPED | OUTPATIENT
Start: 2021-04-23

## 2021-04-22 RX ADMIN — LEVOTHYROXINE SODIUM 200 MCG: 25 TABLET ORAL at 05:27

## 2021-04-22 RX ADMIN — LEVOTHYROXINE SODIUM 25 MCG: 25 TABLET ORAL at 05:27

## 2021-04-22 RX ADMIN — ZINC SULFATE 220 MG (50 MG) CAPSULE 220 MG: CAPSULE at 08:25

## 2021-04-22 RX ADMIN — INSULIN LISPRO 4 UNITS: 100 INJECTION, SOLUTION INTRAVENOUS; SUBCUTANEOUS at 12:59

## 2021-04-22 RX ADMIN — INSULIN GLARGINE 16 UNITS: 100 INJECTION, SOLUTION SUBCUTANEOUS at 08:25

## 2021-04-22 RX ADMIN — SODIUM CHLORIDE 125 ML/HR: 0.9 INJECTION, SOLUTION INTRAVENOUS at 05:38

## 2021-04-22 RX ADMIN — LISINOPRIL 40 MG: 20 TABLET ORAL at 08:25

## 2021-04-22 RX ADMIN — OXYCODONE HYDROCHLORIDE AND ACETAMINOPHEN 1000 MG: 500 TABLET ORAL at 08:25

## 2021-04-22 RX ADMIN — ENOXAPARIN SODIUM 30 MG: 30 INJECTION SUBCUTANEOUS at 08:26

## 2021-04-22 RX ADMIN — INSULIN LISPRO 4 UNITS: 100 INJECTION, SOLUTION INTRAVENOUS; SUBCUTANEOUS at 08:26

## 2021-04-22 RX ADMIN — Medication 2000 UNITS: at 08:25

## 2021-04-22 RX ADMIN — AMLODIPINE BESYLATE 5 MG: 5 TABLET ORAL at 08:25

## 2021-04-22 RX ADMIN — BENZONATATE 200 MG: 100 CAPSULE ORAL at 08:25

## 2021-04-22 NOTE — DISCHARGE SUMMARY
Discharge Summary - [facility] Internal Medicine  Patient: José Luis Henderson 54 y o  male   MRN: [id number]  PCP: Jo Maldonado PA-C  Unit/Bed#: S -01 Encounter: [id number]            Discharging Physician / Practitioner: Alonzo Wyman MD  PCP: Jo Maldonado PA-C  Admission Date:   Admission Orders (From admission, onward)     Ordered        04/20/21 1923  Inpatient Admission  Once                   Discharge Date: 04/22/21      Reason for Admission:  Cough and fever      Discharge Diagnoses:     Principal Problem:    Pneumonia due to COVID-19 virus    Active Problems:    Insulin dependent diabetes mellitus    Essential hypertension    Hyperlipidemia    Postoperative hypothyroidism    Leukopenia    Thrombocytopenia     Resolved Problems:    Hypokalemia    Sepsis on admission      Consultations During Hospital Stay:  · None      Hospital Course:     * Pneumonia due to COVID-19 virus  Assessment & Plan  Initially requiring up to 3 L of oxygen via nasal cannula -> weaned down to baseline room air yesterday  Was initiated on Remdesivir - will c/w zinc/multivitamin regimen on discharge - c/w vitamin C/D course as well   Procalcitonin and D-dimer normal - ferritin/CRP was elevated  Supportive care otherwise - maintained on airborne/contact precautions  Plan for discharge home today     Insulin dependent diabetes mellitus  Assessment & Plan        Lab Results   Component Value Date     HGBA1C 9 5 (H) 08/31/2020      Continued basal insulin additional SSI coverage per Accu-Cheks w/ addition of prandial insulin for optimized control - resume home regimen on discharge  Carbohydrate restricted diet     Essential hypertension  Assessment & Plan  Continue Norvasc/Zestril     Hyperlipidemia  Assessment & Plan  Continue Lipitor     Postoperative hypothyroidism  Assessment & Plan  S/p thyroidectomy  Continue Synthroid replacement     Leukopenia  Assessment & Plan  Chronic issue      Thrombocytopenia   Assessment & Plan  Platelet count stable      Condition at Discharge: fair       Discharge Day Visit / Exam:     Vitals: Blood Pressure: 142/69 (04/22/21 0751)  Pulse: 86 (04/21/21 2200)  Temperature: 98 6 °F (37 °C) (04/22/21 0751)  Temp Source: Oral (04/22/21 0751)  Respirations: 20 (04/22/21 0751)  SpO2: 98 % (04/21/21 2200)      Physical exam - I had a face-to-face encounter with the patient on day of discharge  Discussion with Patient and/or Family:  The patient has been advised to return to the ER immediately if any symptoms recur or worsen  Discharge instructions/Information to Patient and/or Family:   See after visit summary for information provided to patient and/or family  Provisions for Follow-Up Care:  See after visit summary for information related to follow-up care and any pertinent home health orders  Disposition:   Home      Discharge Medications:  See after visit summary for reconciled discharge medications provided to patient and/or family  Discharge Statement:  I spent 38 minutes discharging the patient  This time was spent on the day of discharge  I had direct contact with the patient on the day of discharge  Greater than 50% of the total time was spent examining patient, answering all patient questions, arranging and discussing plan of care with patient as well as directly providing post-discharge instructions  Additional time then spent on discharge activities  ** Please Note: This note is constructed using a voice recognition dictation system  An occasional wrong word/phrase or sound-a-like substitution may have been picked up by dictation device due to the inherent limitations of voice recognition software  Read the chart carefully and recognize, using reasonable context, where substitutions may have occurred  **

## 2021-04-22 NOTE — PLAN OF CARE
Problem: PAIN - ADULT  Goal: Verbalizes/displays adequate comfort level or baseline comfort level  Description: Interventions:  - Encourage patient to monitor pain and request assistance  - Assess pain using appropriate pain scale  - Administer analgesics based on type and severity of pain and evaluate response  - Implement non-pharmacological measures as appropriate and evaluate response  - Consider cultural and social influences on pain and pain management  - Notify physician/advanced practitioner if interventions unsuccessful or patient reports new pain  Outcome: Progressing     Problem: INFECTION - ADULT  Goal: Absence or prevention of progression during hospitalization  Description: INTERVENTIONS:  - Assess and monitor for signs and symptoms of infection  - Monitor lab/diagnostic results  - Monitor all insertion sites, i e  indwelling lines, tubes, and drains  - Monitor endotracheal if appropriate and nasal secretions for changes in amount and color  - [place] appropriate cooling/warming therapies per order  - Administer medications as ordered  - Instruct and encourage patient and family to use good hand hygiene technique  - Identify and instruct in appropriate isolation precautions for identified infection/condition  Outcome: Progressing  Goal: Absence of fever/infection during neutropenic period  Description: INTERVENTIONS:  - Monitor WBC    Outcome: Progressing     Problem: SAFETY ADULT  Goal: Patient will remain free of falls  Description: INTERVENTIONS:  - Assess patient frequently for physical needs  -  Identify cognitive and physical deficits and behaviors that affect risk of falls    -  [place] fall precautions as indicated by assessment   - Educate patient/family on patient safety including physical limitations  - Instruct patient to call for assistance with activity based on assessment  - Modify environment to reduce risk of injury  - Consider OT/PT consult to assist with strengthening/mobility  Outcome: Progressing  Goal: Maintain or return to baseline ADL function  Description: INTERVENTIONS:  -  Assess patient's ability to carry out ADLs; assess patient's baseline for ADL function and identify physical deficits which impact ability to perform ADLs (bathing, care of mouth/teeth, toileting, grooming, dressing, etc )  - Assess/evaluate cause of self-care deficits   - Assess range of motion  - Assess patient's mobility; develop plan if impaired  - Assess patient's need for assistive devices and provide as appropriate  - Encourage maximum independence but intervene and supervise when necessary  - Involve family in performance of ADLs  - Assess for home care needs following discharge   - Consider OT consult to assist with ADL evaluation and planning for discharge  - Provide patient education as appropriate  Outcome: Progressing  Goal: Maintain or return mobility status to optimal level  Description: INTERVENTIONS:  - Assess patient's baseline mobility status (ambulation, transfers, stairs, etc )    - Identify cognitive and physical deficits and behaviors that affect mobility  - Identify mobility aids required to assist with transfers and/or ambulation (gait belt, sit-to-stand, lift, walker, cane, etc )  - [place] fall precautions as indicated by assessment  - Record patient progress and toleration of activity level on Mobility SBAR; progress patient to next Phase/Stage  - Instruct patient to call for assistance with activity based on assessment  - Consider rehabilitation consult to assist with strengthening/weightbearing, etc   Outcome: Progressing     Problem: DISCHARGE PLANNING  Goal: Discharge to home or other facility with appropriate resources  Description: INTERVENTIONS:  - Identify barriers to discharge w/patient and caregiver  - Arrange for needed discharge resources and transportation as appropriate  - Identify discharge learning needs (meds, wound care, etc )  - Arrange for interpretive services to assist at discharge as needed  - Refer to Case Management Department for coordinating discharge planning if the patient needs post-hospital services based on physician/advanced practitioner order or complex needs related to functional status, cognitive ability, or social support system  Outcome: Progressing     Problem: Knowledge Deficit  Goal: Patient/family/caregiver demonstrates understanding of disease process, treatment plan, medications, and discharge instructions  Description: Complete learning assessment and assess knowledge base    Interventions:  - Provide teaching at level of understanding  - Provide teaching via preferred learning methods  Outcome: Progressing

## 2021-04-23 ENCOUNTER — TRANSITIONAL CARE MANAGEMENT (OUTPATIENT)
Dept: FAMILY MEDICINE CLINIC | Facility: CLINIC | Age: 46
End: 2021-04-23

## 2021-04-23 NOTE — UTILIZATION REVIEW
Notification of Discharge   This is a Notification of Discharge from our facility [address]  Please be advised that this patient has been discharge from our facility  Below you will find the admission and discharge date and time including the patients disposition  UTILIZATION REVIEW CONTACT:  Linda Hall  Utilization   Network Utilization Review Department  Phone: [phone] x carefully listen to the prompts  All voicemails are confidential   Email: [email or web address]     PHYSICIAN ADVISORY SERVICES:  FOR [id number] REVIEW - MEDICAL NECESSITY DENIAL  Phone: [phone]  Fax: [phone]  Email: [email or web address]     PRESENTATION DATE: 4/20/2021  3:28 PM  OBERVATION ADMISSION DATE:   INPATIENT ADMISSION DATE: 4/20/21 1923   DISCHARGE DATE: 4/22/2021  7:31 PM  DISPOSITION: Home/Self Care Home/Self Care      IMPORTANT INFORMATION:  Send all requests for admission clinical reviews, approved or denied determinations and any other requests to dedicated fax number below belonging to the campus where the patient is receiving treatment   List of dedicated fax numbers:  [address] DENIALS (Administrative/Medical Necessity) [phone]   [address] [place] [address] (Maternity/NICU/Pediatrics) [phone]   Roxana Washington [phone]   Alyssa Silvestre [phone]   Digna Jain [phone]   [address] [phone]   [facility]  [phone]   [address] [place] [address], [address]  [address] [facility] [address] [facility] [phone]

## 2021-04-25 LAB
BACTERIA BLD CULT: NORMAL
BACTERIA BLD CULT: NORMAL

## 2021-04-26 ENCOUNTER — TELEMEDICINE (OUTPATIENT)
Dept: FAMILY MEDICINE CLINIC | Facility: CLINIC | Age: 46
End: 2021-04-26
Payer: COMMERCIAL

## 2021-04-26 ENCOUNTER — TELEPHONE (OUTPATIENT)
Dept: FAMILY MEDICINE CLINIC | Facility: CLINIC | Age: 46
End: 2021-04-26

## 2021-04-26 VITALS — HEIGHT: 68 IN | WEIGHT: 200 LBS | BODY MASS INDEX: 30.31 KG/M2

## 2021-04-26 DIAGNOSIS — D72.819 LEUKOPENIA, UNSPECIFIED TYPE: ICD-10-CM

## 2021-04-26 DIAGNOSIS — U07.1 PNEUMONIA DUE TO COVID-19 VIRUS: Primary | ICD-10-CM

## 2021-04-26 DIAGNOSIS — E11.65 UNCONTROLLED TYPE 2 DIABETES MELLITUS WITH HYPERGLYCEMIA (HCC): ICD-10-CM

## 2021-04-26 DIAGNOSIS — D69.6 THROMBOCYTOPENIA (HCC): ICD-10-CM

## 2021-04-26 DIAGNOSIS — E89.0 POSTOPERATIVE HYPOTHYROIDISM: ICD-10-CM

## 2021-04-26 DIAGNOSIS — I10 ESSENTIAL HYPERTENSION: ICD-10-CM

## 2021-04-26 DIAGNOSIS — E78.2 MIXED HYPERLIPIDEMIA: ICD-10-CM

## 2021-04-26 DIAGNOSIS — J12.82 PNEUMONIA DUE TO COVID-19 VIRUS: Primary | ICD-10-CM

## 2021-04-26 PROBLEM — J06.9 ACUTE UPPER RESPIRATORY INFECTION: Status: RESOLVED | Noted: 2019-04-15 | Resolved: 2021-04-26

## 2021-04-26 PROCEDURE — 99496 TRANSJ CARE MGMT HIGH F2F 7D: CPT | Performed by: PHYSICIAN ASSISTANT

## 2021-04-26 NOTE — PROGRESS NOTES
Assessment/Plan:        Problem List Items Addressed This Visit        Endocrine    Postoperative hypothyroidism    Relevant Orders    Hemoglobin A1C    CBC and differential    Lipid panel    TSH, 3rd generation    Uncontrolled type 2 diabetes mellitus with hyperglycemia (HCC)    Relevant Orders    Hemoglobin A1C    CBC and differential    Lipid panel    TSH, 3rd generation       Respiratory    Pneumonia due to COVID-19 virus - Primary    Relevant Orders    XR chest pa & lateral       Cardiovascular and Mediastinum    Essential hypertension    Relevant Orders    Hemoglobin A1C    CBC and differential    Lipid panel    TSH, 3rd generation       Other    Mixed hyperlipidemia    Relevant Orders    Hemoglobin A1C    CBC and differential    Lipid panel    TSH, 3rd generation    Leukopenia    Relevant Orders    Hemoglobin A1C    CBC and differential    Lipid panel    TSH, 3rd generation    Thrombocytopenia     Relevant Orders    Hemoglobin A1C    CBC and differential    Lipid panel    TSH, 3rd generation          10 days post COVID-19 pneumonia    [facility] records have been reviewed   - patient states only taking vitamins at this time   - encouraged to take medications as advised   - repeat chest x-ray in 1 month   - I did order a CBC repeat due to leukopenia and thrombocytopenia  Also added hemoglobin A1c, lipid profile, TSH level which was not done in the hospital  - note printed for return to work on 05/03/2021   - patient states he will drop off [place] paperwork today but advised that it may take 2-3 days to complete paperwork   -patient aware that not considered contagious as of tomorrow  - Camille Foster,  also aware of above  - recommend a follow-up at the end of the week and he has been encouraged to proceed with the blood work prior to the visit    M*Modal software was used to dictate this note  It may contain errors with dictating incorrect words/spelling   Please contact provider directly for any questions  Reason for visit is   Virtual TCM via Google duo    Encounter provider [facility]       Provider located at [address] [place] [address]  [address] [facility],[address]   [facility] [address] [place] [id number]      Recent Visits  Date Type Provider Dept   04/19/21 Telephone Sharri Joe [facility]   04/19/21 Telemedicine Mariajose Baird PA-C [facility]   Showing recent visits within past 7 days and meeting all other requirements     Today's Visits  Date Type Provider Dept   04/26/21 Telemedicine Mariajose Baird PA-C [facility]   Showing today's visits and meeting all other requirements     Future Appointments  No visits were found meeting these conditions  Showing future appointments within next 150 days and meeting all other requirements        After connecting through [employer], the patient was identified by name and date of birth  Wade Rodriguez was informed that this is a telemedicine visit and that the visit is being conducted through [employer] and patient was informed that this is not a secure, HIPAA-compliant platform  He agrees to proceed     My office door was closed  No one else was in the room  He acknowledged consent and understanding of privacy and security of the video platform  The patient has agreed to participate and understands they can discontinue the visit at any time  Patient is aware this is a billable service  Subjective:     Patient ID: Wade Rodriguez is a 54 y o  male  Patient presents today for a virtual follow-up for a recent hospitalization from April 20th through April 22nd for COVID-19 pneumonia  He did start the visit off by asking me " how much we get paid for diagnosing someone with COVID-19"  He does have access to my chart he states he did see his test results but he said that someone put his resolved in the system for the COVID-19 infection    He states overall he has improved significantly  He does have an occasional cough  He denies any fevers or chills  He is now day 10 of COVID-19 pneumonia  He states he has been out of work since 04/21/2021  He does not want to return to work until 05/03/2021  He does have FMLA paperwork that needs to be completed and he is asking that this is done today  He did stop all his medications for diabetes/ hypertension because he told Vannessa at time of triage that he lost track of everything  Review of Systems   Constitutional: Negative for chills and fever  Respiratory: Positive for cough  Negative for shortness of breath  Cardiovascular: Negative for chest pain  Objective:    Vitals:    04/26/21 1031   Weight: 90 7 kg (200 lb)   Height: 5' 8" (1 727 m)       Physical Exam  Constitutional:       General: He is not in acute distress  Appearance: Normal appearance  He is not ill-appearing, toxic-appearing or diaphoretic  Neck:      Musculoskeletal: Neck supple  Pulmonary:      Effort: Pulmonary effort is normal  No respiratory distress ( patient able to talk in full sentences without coughing or appearing short of breath)  Neurological:      General: No focal deficit present  Mental Status: He is alert  Transitional Care Management Review:  Cassandra Tracy is a 54 y o  male here for TCM follow up  During the TCM phone call patient stated:    TCM Call (since 3/26/2021)     Date and time call was made  4/23/2021  9:37 AM    Hospital care reviewed  Records reviewed    Patient was hospitialized at  [address] [place] [address]        Date of Admission  04/20/21    Date of discharge  04/22/21    Diagnosis  COVID and Pneumonia    Disposition  Home    Were the patients medications reviewed and updated  No    Current Symptoms  Cough    Cough Severity  Mild      TCM Call (since 3/26/2021)     Post hospital issues  None    Should patient be enrolled in anticoag monitoring?   No    Scheduled for follow up? Yes    I have advised the patient to call PCP with any new or worsening symptoms  [address]  Partner    The type of support provided  None    Do you have social support  Yes, as much as I need    Are you recieving any outpatient services  No    Are you recieving home care services  No    Are you using any community resources  No    Current waiver services  No    Have you fallen in the last 12 months  No    Interperter language line needed  No          I spent 15 minutes with the patient during this visit      Mariajose Baird PA-C

## 2021-04-26 NOTE — LETTER
April 26, 2021     Patient: José Luis Henderson   YOB: 1975   Date of Visit: 4/26/2021       To Whom it May Concern:    Diallo Floresea is under my professional care  He was seen in my office on 4/26/2021  He  Is able to return to work on 05/03/2021  If you have any questions or concerns, please don't hesitate to call           Sincerely,          Mariajose Baird PA-C        CC: No Recipients

## 2021-04-26 NOTE — TELEPHONE ENCOUNTER
I did complete the paperwork in give it back to [facility]  It does need the tax ID number and the phone number for [facility]    Thank you

## 2021-04-26 NOTE — TELEPHONE ENCOUNTER
FMLA paperwork to be completed  Place on the clipboard on your desk  Only page 3 needs to be completed by our office

## 2021-04-27 ENCOUNTER — HOSPITAL ENCOUNTER (OUTPATIENT)
Dept: RADIOLOGY | Facility: IMAGING CENTER | Age: 46
Discharge: HOME | End: 2021-04-27
Payer: COMMERCIAL

## 2021-04-27 ENCOUNTER — APPOINTMENT (OUTPATIENT)
Dept: LAB | Facility: IMAGING CENTER | Age: 46
End: 2021-04-27
Payer: COMMERCIAL

## 2021-04-27 DIAGNOSIS — I10 ESSENTIAL HYPERTENSION: ICD-10-CM

## 2021-04-27 DIAGNOSIS — E78.2 MIXED HYPERLIPIDEMIA: ICD-10-CM

## 2021-04-27 DIAGNOSIS — U07.1 PNEUMONIA DUE TO COVID-19 VIRUS: ICD-10-CM

## 2021-04-27 DIAGNOSIS — E89.0 POSTOPERATIVE HYPOTHYROIDISM: ICD-10-CM

## 2021-04-27 DIAGNOSIS — J06.9 ACUTE UPPER RESPIRATORY INFECTION: ICD-10-CM

## 2021-04-27 DIAGNOSIS — D72.819 LEUKOPENIA, UNSPECIFIED TYPE: ICD-10-CM

## 2021-04-27 DIAGNOSIS — E11.65 UNCONTROLLED TYPE 2 DIABETES MELLITUS WITH HYPERGLYCEMIA (HCC): ICD-10-CM

## 2021-04-27 DIAGNOSIS — D69.6 THROMBOCYTOPENIA (HCC): ICD-10-CM

## 2021-04-27 DIAGNOSIS — J12.82 PNEUMONIA DUE TO COVID-19 VIRUS: ICD-10-CM

## 2021-04-27 LAB
ALBUMIN SERPL BCP-MCNC: 3.2 G/DL (ref 3.5–5)
ALP SERPL-CCNC: 80 U/L (ref 46–116)
ALT SERPL W P-5'-P-CCNC: 80 U/L (ref 12–78)
ANION GAP SERPL CALCULATED.3IONS-SCNC: 4 MMOL/L (ref 4–13)
AST SERPL W P-5'-P-CCNC: 24 U/L (ref 5–45)
BASOPHILS # BLD AUTO: 0.04 THOUSANDS/ΜL (ref 0–0.1)
BASOPHILS NFR BLD AUTO: 1 % (ref 0–1)
BILIRUB SERPL-MCNC: 0.45 MG/DL (ref 0.2–1)
BUN SERPL-MCNC: 11 MG/DL (ref 5–25)
CALCIUM ALBUM COR SERPL-MCNC: 8.8 MG/DL (ref 8.3–10.1)
CALCIUM SERPL-MCNC: 8.2 MG/DL (ref 8.3–10.1)
CHLORIDE SERPL-SCNC: 108 MMOL/L (ref 100–108)
CHOLEST SERPL-MCNC: 118 MG/DL (ref 50–200)
CO2 SERPL-SCNC: 30 MMOL/L (ref 21–32)
CREAT SERPL-MCNC: 0.81 MG/DL (ref 0.6–1.3)
CREAT UR-MCNC: 293 MG/DL
EOSINOPHIL # BLD AUTO: 0.08 THOUSAND/ΜL (ref 0–0.61)
EOSINOPHIL NFR BLD AUTO: 2 % (ref 0–6)
ERYTHROCYTE [DISTWIDTH] IN BLOOD BY AUTOMATED COUNT: 12.7 % (ref 11.6–15.1)
EST. AVERAGE GLUCOSE BLD GHB EST-MCNC: 194 MG/DL
GFR SERPL CREATININE-BSD FRML MDRD: 107 ML/MIN/1.73SQ M
GLUCOSE P FAST SERPL-MCNC: 189 MG/DL (ref 65–99)
HBA1C MFR BLD: 8.4 %
HCT VFR BLD AUTO: 41.7 % (ref 36.5–49.3)
HDLC SERPL-MCNC: 27 MG/DL
HGB BLD-MCNC: 13.9 G/DL (ref 12–17)
IMM GRANULOCYTES # BLD AUTO: 0.07 THOUSAND/UL (ref 0–0.2)
IMM GRANULOCYTES NFR BLD AUTO: 2 % (ref 0–2)
LDLC SERPL CALC-MCNC: 79 MG/DL (ref 0–100)
LYMPHOCYTES # BLD AUTO: 0.77 THOUSANDS/ΜL (ref 0.6–4.47)
LYMPHOCYTES NFR BLD AUTO: 18 % (ref 14–44)
MCH RBC QN AUTO: 28.9 PG (ref 26.8–34.3)
MCHC RBC AUTO-ENTMCNC: 33.3 G/DL (ref 31.4–37.4)
MCV RBC AUTO: 87 FL (ref 82–98)
MICROALBUMIN UR-MCNC: 20 MG/L (ref 0–20)
MICROALBUMIN/CREAT 24H UR: 7 MG/G CREATININE (ref 0–30)
MONOCYTES # BLD AUTO: 0.28 THOUSAND/ΜL (ref 0.17–1.22)
MONOCYTES NFR BLD AUTO: 7 % (ref 4–12)
NEUTROPHILS # BLD AUTO: 2.99 THOUSANDS/ΜL (ref 1.85–7.62)
NEUTS SEG NFR BLD AUTO: 70 % (ref 43–75)
NONHDLC SERPL-MCNC: 91 MG/DL
NRBC BLD AUTO-RTO: 0 /100 WBCS
PLATELET # BLD AUTO: 236 THOUSANDS/UL (ref 149–390)
PMV BLD AUTO: 9.7 FL (ref 8.9–12.7)
POTASSIUM SERPL-SCNC: 4.4 MMOL/L (ref 3.5–5.3)
PROT SERPL-MCNC: 7.5 G/DL (ref 6.4–8.2)
RBC # BLD AUTO: 4.81 MILLION/UL (ref 3.88–5.62)
SODIUM SERPL-SCNC: 142 MMOL/L (ref 136–145)
TRIGL SERPL-MCNC: 59 MG/DL
TSH SERPL DL<=0.05 MIU/L-ACNC: 26.8 UIU/ML (ref 0.36–3.74)
WBC # BLD AUTO: 4.23 THOUSAND/UL (ref 4.31–10.16)

## 2021-04-27 PROCEDURE — 82043 UR ALBUMIN QUANTITATIVE: CPT

## 2021-04-27 PROCEDURE — 80053 COMPREHEN METABOLIC PANEL: CPT | Performed by: PHYSICIAN ASSISTANT

## 2021-04-27 PROCEDURE — 80061 LIPID PANEL: CPT | Performed by: PHYSICIAN ASSISTANT

## 2021-04-27 PROCEDURE — 82570 ASSAY OF URINE CREATININE: CPT

## 2021-04-27 PROCEDURE — 85025 COMPLETE CBC W/AUTO DIFF WBC: CPT | Performed by: PHYSICIAN ASSISTANT

## 2021-04-27 PROCEDURE — 3052F HG A1C>EQUAL 8.0%<EQUAL 9.0%: CPT | Performed by: PHYSICIAN ASSISTANT

## 2021-04-27 PROCEDURE — 84443 ASSAY THYROID STIM HORMONE: CPT

## 2021-04-27 PROCEDURE — 83036 HEMOGLOBIN GLYCOSYLATED A1C: CPT | Performed by: PHYSICIAN ASSISTANT

## 2021-04-27 PROCEDURE — 36415 COLL VENOUS BLD VENIPUNCTURE: CPT | Performed by: PHYSICIAN ASSISTANT

## 2021-04-27 PROCEDURE — 3061F NEG MICROALBUMINURIA REV: CPT | Performed by: PHYSICIAN ASSISTANT

## 2021-04-29 ENCOUNTER — OFFICE VISIT (OUTPATIENT)
Dept: FAMILY MEDICINE CLINIC | Facility: CLINIC | Age: 46
End: 2021-04-29
Payer: COMMERCIAL

## 2021-04-29 VITALS
BODY MASS INDEX: 30.46 KG/M2 | OXYGEN SATURATION: 98 % | HEIGHT: 68 IN | TEMPERATURE: 98 F | WEIGHT: 201 LBS | DIASTOLIC BLOOD PRESSURE: 78 MMHG | HEART RATE: 100 BPM | RESPIRATION RATE: 16 BRPM | SYSTOLIC BLOOD PRESSURE: 112 MMHG

## 2021-04-29 DIAGNOSIS — E78.2 MIXED HYPERLIPIDEMIA: ICD-10-CM

## 2021-04-29 DIAGNOSIS — D72.819 LEUKOPENIA, UNSPECIFIED TYPE: ICD-10-CM

## 2021-04-29 DIAGNOSIS — I10 ESSENTIAL HYPERTENSION: ICD-10-CM

## 2021-04-29 DIAGNOSIS — E89.0 POSTOPERATIVE HYPOTHYROIDISM: ICD-10-CM

## 2021-04-29 DIAGNOSIS — E11.65 UNCONTROLLED TYPE 2 DIABETES MELLITUS WITH HYPERGLYCEMIA (HCC): Primary | ICD-10-CM

## 2021-04-29 PROBLEM — D69.6 THROMBOCYTOPENIA (HCC): Status: RESOLVED | Noted: 2021-04-21 | Resolved: 2021-04-29

## 2021-04-29 PROCEDURE — 1111F DSCHRG MED/CURRENT MED MERGE: CPT | Performed by: PHYSICIAN ASSISTANT

## 2021-04-29 PROCEDURE — 3008F BODY MASS INDEX DOCD: CPT | Performed by: PHYSICIAN ASSISTANT

## 2021-04-29 PROCEDURE — 99214 OFFICE O/P EST MOD 30 MIN: CPT | Performed by: PHYSICIAN ASSISTANT

## 2021-04-29 PROCEDURE — 1036F TOBACCO NON-USER: CPT | Performed by: PHYSICIAN ASSISTANT

## 2021-04-29 NOTE — PROGRESS NOTES
Assessment/Plan:     patient refuses to remove his shoes at this time for a foot exam     - I did ask him if he does need any refills on his medications at this time and he said no  - I did not see any notes in the system from his current endocrinologist   He is unsure when he was seen last   Medications were not filled in the system since 2019  He states he does have medications at home at this time  - I did recommend he restart the metformin but take half tablet of the 1000 mg twice daily for 3 weeks and then increase it to 1 tablet twice daily  -he is not currently utilizing his insulin either  -lab results have been reviewed and he does have an elevated TSH level  Hemoglobin A1c is still elevated at 8 4 although it did improve  He does have a decrease calcium level  He is currently on 225 mcg of levothyroxine  I did recommend follow-up with endocrinology  I did give him the phone number for [facility] if he would like to establish care with a [facility] provider instead  - I did give him a diabetic eye form and explain the importance of having his eyes check for retinopathy  -  I did recommend follow-up in the beginning of August and proceed with labs prior to that visit    M*Real Intent software was used to dictate this note  It may contain errors with dictating incorrect words/spelling  Please contact provider directly for any questions  BMI Counseling: Body mass index is 30 56 kg/m²  The BMI is above normal  Nutrition recommendations include reducing portion sizes, decreasing overall calorie intake, decreasing soda and/or juice intake and moderation in carbohydrate intake  Exercise recommendations include exercising 3-5 times per week  Diagnoses and all orders for this visit:    Uncontrolled type 2 diabetes mellitus with hyperglycemia ([facility] )  -     Ambulatory referral to Endocrinology; Future  -     Comprehensive metabolic panel;  Future  -     Hemoglobin A1C; Future  - Lipid panel; Future  -     TSH, 3rd generation; Future    Postoperative hypothyroidism  -     Ambulatory referral to Endocrinology; Future  -     Comprehensive metabolic panel; Future  -     Hemoglobin A1C; Future  -     Lipid panel; Future  -     TSH, 3rd generation; Future    Mixed hyperlipidemia  -     Comprehensive metabolic panel; Future  -     Hemoglobin A1C; Future  -     Lipid panel; Future  -     TSH, 3rd generation; Future    Leukopenia, unspecified type    Essential hypertension  -     Comprehensive metabolic panel; Future  -     Hemoglobin A1C; Future  -     Lipid panel; Future  -     TSH, 3rd generation; Future          Subjective:      Patient ID: Megha Cortez is a 54 y o  male  Patient presents today for routine follow-up for hypothyroidism, diabetes, hypertension, hyperlipidemia  He states he has not been taking his blood pressure medications or his metformin for at least 1 and half months  He states he was seeing an endocrinologist any states the last time he was seen was back in the fall  He states he did miss a dose of his levothyroxine but he does take this on a daily basis otherwise  He recently had COVID-19 infection  He states he is doing well at this time  He is returning to work on Monday  He did have an appointment at [address] [place] [address] for his eyes but he states that he could not make the appointment but he did not reschedule  His last appointment was several years ago  He does not check his blood sugars at home  He states recently he has been drinking a lot of Gatorade to help with hydration  The following portions of the patient's history were reviewed and updated as appropriate:   He  has a past medical history of Cancer ([facility] ), Diabetes mellitus ([facility] ), Disease of thyroid gland, Hyperlipidemia, and Hypertension    He   Patient Active Problem List    Diagnosis Date Noted    Mixed hyperlipidemia 04/21/2021    Leukopenia 04/21/2021    Pneumonia due to COVID-19 virus 04/20/2021    [facility] 04/20/2021    Anxiety 03/19/2020    Uncontrolled type 2 diabetes mellitus with hyperglycemia ([facility] ) 03/19/2020    Insulin dependent diabetes mellitus 04/15/2019    Postoperative hypothyroidism 04/15/2019    Essential hypertension 04/15/2019     He  has a past surgical history that includes Neck surgery (04/21/2017)  His family history includes COPD in his father; Cancer in his father; Diabetes type II in his mother  He  reports that he has never smoked  He has never used smokeless tobacco  He reports that he does not drink alcohol or use drugs    Current Outpatient Medications   Medication Sig Dispense Refill    ascorbic acid (VITAMIN C) 1000 MG tablet Take 1 tablet (1,000 mg total) by mouth every 12 (twelve) hours for 10 doses 10 tablet 0    cholecalciferol (VITAMIN D3) 1,000 units tablet Take 2 tablets (2,000 Units total) by mouth daily 20 tablet 0    levothyroxine 200 mcg tablet Take 200 mcg by mouth daily  6    levothyroxine 25 mcg tablet Take 25 mcg by mouth daily      multivitamin-minerals (CENTRUM ADULTS) tablet Take 1 tablet by mouth daily 30 tablet 0    amLODIPine (NORVASC) 5 mg tablet       atorvastatin (LIPITOR) 20 mg tablet Take 20 mg by mouth daily at bedtime  5    benazepril (LOTENSIN) 40 MG tablet       clobetasol (TEMOVATE) 0 05 % cream APPLY UP TO TWICE DAILY TO SKIN OR ECZEMA  0    glucose blood test strip test blood sugar twice daily or prn      hydrocortisone 1 % lotion APPLY AT BEDTIME  3    Insulin Degludec-Liraglutide (XULTOPHY) 100 units-3 6 mg/mL injection pen inject (16UNITS) every morning; may increase by 2u every 3 days until B is below 120 in am      Insulin Pen Needle (PEN NEEDLES) 32G X 4 MM MISC inject once daily as directed      metFORMIN (GLUCOPHAGE) 1000 MG tablet Take 1 tablet (1,000 mg total) by mouth 2 (two) times a day with meals (Patient not taking: Reported on 4/26/2021) 180 tablet 1    zinc sulfate (ZINCATE) 220 mg capsule Take 1 capsule (220 mg total) by mouth daily for 5 doses 5 capsule 0     No current facility-administered medications for this visit  Current Outpatient Medications on File Prior to Visit   Medication Sig    ascorbic acid (VITAMIN C) 1000 MG tablet Take 1 tablet (1,000 mg total) by mouth every 12 (twelve) hours for 10 doses    cholecalciferol (VITAMIN D3) 1,000 units tablet Take 2 tablets (2,000 Units total) by mouth daily    levothyroxine 200 mcg tablet Take 200 mcg by mouth daily    levothyroxine 25 mcg tablet Take 25 mcg by mouth daily    multivitamin-minerals (CENTRUM ADULTS) tablet Take 1 tablet by mouth daily    amLODIPine (NORVASC) 5 mg tablet     atorvastatin (LIPITOR) 20 mg tablet Take 20 mg by mouth daily at bedtime    benazepril (LOTENSIN) 40 MG tablet     clobetasol (TEMOVATE) 0 05 % cream APPLY UP TO TWICE DAILY TO SKIN OR ECZEMA    glucose blood test strip test blood sugar twice daily or prn    hydrocortisone 1 % lotion APPLY AT BEDTIME    Insulin Degludec-Liraglutide (XULTOPHY) 100 units-3 6 mg/mL injection pen inject (16UNITS) every morning; may increase by 2u every 3 days until B is below 120 in am    Insulin Pen Needle (PEN NEEDLES) 32G X 4 MM MISC inject once daily as directed    metFORMIN (GLUCOPHAGE) 1000 MG tablet Take 1 tablet (1,000 mg total) by mouth 2 (two) times a day with meals (Patient not taking: Reported on 4/26/2021)    zinc sulfate (ZINCATE) 220 mg capsule Take 1 capsule (220 mg total) by mouth daily for 5 doses     No current facility-administered medications on file prior to visit  He is allergic to no active allergies       Review of Systems   Constitutional: Negative  Respiratory: Negative for cough and shortness of breath  Cardiovascular: Negative for chest pain and leg swelling           Objective:      /78 (BP Location: Left arm, Patient Position: Sitting, Cuff Size: Standard)   Pulse 100   Temp 98 °F (36 7 °C) (Tympanic)   Resp 16 Ht 5' 8" (1 727 m)   Wt 91 2 kg (201 lb)   SpO2 98%   BMI 30 56 kg/m²          Physical Exam  Constitutional:       General: He is not in acute distress  Appearance: Normal appearance  He is well-developed  He is not ill-appearing, toxic-appearing or diaphoretic  HENT:      Head: Normocephalic and atraumatic  Right Ear: Tympanic membrane, ear canal and external ear normal       Left Ear: Tympanic membrane, ear canal and external ear normal    Neck:      Musculoskeletal: Normal range of motion and neck supple  Thyroid: No thyromegaly  Cardiovascular:      Rate and Rhythm: Normal rate and regular rhythm  Heart sounds: Normal heart sounds  No murmur  Pulmonary:      Effort: Pulmonary effort is normal  No respiratory distress  Breath sounds: Normal breath sounds  No wheezing, rhonchi or rales  Abdominal:      General: Bowel sounds are normal       Palpations: Abdomen is soft  There is no mass  Tenderness: There is no abdominal tenderness  Musculoskeletal:      Right lower leg: No edema  Left lower leg: No edema  Lymphadenopathy:      Cervical: No cervical adenopathy  Skin:     General: Skin is warm  Neurological:      General: No focal deficit present  Mental Status: He is alert  Psychiatric:         Mood and Affect: Mood normal          Behavior: Behavior normal          Thought Content:  Thought content normal          Judgment: Judgment normal

## 2021-05-12 ENCOUNTER — VBI (OUTPATIENT)
Dept: ADMINISTRATIVE | Facility: OTHER | Age: 46
End: 2021-05-12

## 2021-08-19 ENCOUNTER — TELEPHONE (OUTPATIENT)
Dept: FAMILY MEDICINE CLINIC | Facility: CLINIC | Age: 46
End: 2021-08-19

## 2021-08-19 ENCOUNTER — RA CDI HCC (OUTPATIENT)
Dept: OTHER | Facility: HOSPITAL | Age: 46
End: 2021-08-19

## 2021-08-19 NOTE — TELEPHONE ENCOUNTER
Please call and remind him to proceed with the fasting lab orders in the system in preparation for his appointment on Thursday August 26 arrival at 5:30 p m  thank you

## 2021-08-19 NOTE — PROGRESS NOTES
[facility]  coding opportunities          Number of diagnosis code(s) already on the problem list added to FYI fla                     Patients insurance company: [employer] (Medicare Advantage and Commercial)     Visit status: Patient canceled the appointment     Provider never responded to [address]  coding request     [facility]  coding opportunities          Number of diagnosis code(s) already on the problem list added to FYI fla                   Found on active problem list- please assess using MEAT for current status - resolve to hx if appropriate  A41 89 Viral sepsis ([facility] )    Patients insurance company: [employer] ("[employer]

## 2021-08-31 ENCOUNTER — VBI (OUTPATIENT)
Dept: ADMINISTRATIVE | Facility: OTHER | Age: 46
End: 2021-08-31

## 2021-09-23 ENCOUNTER — TELEPHONE (OUTPATIENT)
Dept: FAMILY MEDICINE CLINIC | Facility: CLINIC | Age: 46
End: 2021-09-23

## 2021-09-23 NOTE — TELEPHONE ENCOUNTER
Please call him for a routine follow-up for diabetes  There are fasting lab orders in the system for him to complete prior to his appointment  He did cancel his appointment in August but did not reschedule  I am not sure if he is aware of my transfer    Thank you

## 2021-10-28 ENCOUNTER — VBI (OUTPATIENT)
Dept: ADMINISTRATIVE | Facility: OTHER | Age: 46
End: 2021-10-28

## 2021-12-01 ENCOUNTER — VBI (OUTPATIENT)
Dept: ADMINISTRATIVE | Facility: OTHER | Age: 46
End: 2021-12-01

## 2021-12-09 ENCOUNTER — TELEPHONE (OUTPATIENT)
Dept: FAMILY MEDICINE CLINIC | Facility: CLINIC | Age: 46
End: 2021-12-09

## 2022-01-26 ENCOUNTER — VBI (OUTPATIENT)
Dept: ADMINISTRATIVE | Facility: OTHER | Age: 47
End: 2022-01-26

## 2022-04-22 ENCOUNTER — TELEPHONE (OUTPATIENT)
Dept: FAMILY MEDICINE CLINIC | Facility: CLINIC | Age: 47
End: 2022-04-22

## 2022-04-22 NOTE — TELEPHONE ENCOUNTER
I did speak to Shreyas Asif because I have not seen him in almost a year  He states that he is currently working 2 jobs and this is why he has not seen a provider for his diabetes  His other concern is the fact that he would need to wear a mask  I did advised him that if not wearing a mask is what encouraged him to come in he would not need to as long as he is not ill  He states that he does have this number and he will call back to make an appointment

## 2022-05-04 ENCOUNTER — VBI (OUTPATIENT)
Dept: ADMINISTRATIVE | Facility: OTHER | Age: 47
End: 2022-05-04

## 2022-06-29 ENCOUNTER — VBI (OUTPATIENT)
Dept: ADMINISTRATIVE | Facility: OTHER | Age: 47
End: 2022-06-29

## 2022-07-25 ENCOUNTER — APPOINTMENT (EMERGENCY)
Dept: CT IMAGING | Facility: HOSPITAL | Age: 47
End: 2022-07-25
Payer: COMMERCIAL

## 2022-07-25 ENCOUNTER — HOSPITAL ENCOUNTER (EMERGENCY)
Facility: HOSPITAL | Age: 47
Discharge: HOME | End: 2022-07-25
Attending: EMERGENCY MEDICINE
Payer: COMMERCIAL

## 2022-07-25 VITALS
OXYGEN SATURATION: 98 % | WEIGHT: 200 LBS | TEMPERATURE: 97.8 F | BODY MASS INDEX: 30.41 KG/M2 | RESPIRATION RATE: 18 BRPM | SYSTOLIC BLOOD PRESSURE: 137 MMHG | HEART RATE: 76 BPM | DIASTOLIC BLOOD PRESSURE: 76 MMHG

## 2022-07-25 DIAGNOSIS — K76.89 LIVER NODULE: ICD-10-CM

## 2022-07-25 DIAGNOSIS — N12 PYELONEPHRITIS: Primary | ICD-10-CM

## 2022-07-25 DIAGNOSIS — R10.9 RIGHT FLANK PAIN: ICD-10-CM

## 2022-07-25 LAB
ALBUMIN SERPL BCP-MCNC: 4.4 G/DL (ref 3.5–5)
ALP SERPL-CCNC: 57 U/L (ref 34–104)
ALT SERPL W P-5'-P-CCNC: 16 U/L (ref 7–52)
ANION GAP SERPL CALCULATED.3IONS-SCNC: 8 MMOL/L (ref 4–13)
AST SERPL W P-5'-P-CCNC: 12 U/L (ref 13–39)
ATRIAL RATE: 74 BPM
BACTERIA UR QL AUTO: ABNORMAL /HPF
BASOPHILS # BLD AUTO: 0.07 THOUSANDS/ΜL (ref 0–0.1)
BASOPHILS NFR BLD AUTO: 2 % (ref 0–1)
BILIRUB SERPL-MCNC: 1.2 MG/DL (ref 0.2–1)
BILIRUB UR QL STRIP: NEGATIVE
BUN SERPL-MCNC: 19 MG/DL (ref 5–25)
CALCIUM SERPL-MCNC: 8.7 MG/DL (ref 8.4–10.2)
CHLORIDE SERPL-SCNC: 102 MMOL/L (ref 96–108)
CLARITY UR: CLEAR
CO2 SERPL-SCNC: 25 MMOL/L (ref 21–32)
COLOR UR: ABNORMAL
CREAT SERPL-MCNC: 0.9 MG/DL (ref 0.6–1.3)
EOSINOPHIL # BLD AUTO: 0.1 THOUSAND/ΜL (ref 0–0.61)
EOSINOPHIL NFR BLD AUTO: 3 % (ref 0–6)
ERYTHROCYTE [DISTWIDTH] IN BLOOD BY AUTOMATED COUNT: 13 % (ref 11.6–15.1)
GFR SERPL CREATININE-BSD FRML MDRD: 100 ML/MIN/1.73SQ M
GLUCOSE SERPL-MCNC: 276 MG/DL (ref 65–140)
GLUCOSE UR STRIP-MCNC: ABNORMAL MG/DL
HCT VFR BLD AUTO: 45.9 % (ref 36.5–49.3)
HGB BLD-MCNC: 17 G/DL (ref 12–17)
HGB UR QL STRIP.AUTO: NEGATIVE
IMM GRANULOCYTES # BLD AUTO: 0.04 THOUSAND/UL (ref 0–0.2)
IMM GRANULOCYTES NFR BLD AUTO: 1 % (ref 0–2)
KETONES UR STRIP-MCNC: ABNORMAL MG/DL
LEUKOCYTE ESTERASE UR QL STRIP: ABNORMAL
LYMPHOCYTES # BLD AUTO: 1.07 THOUSANDS/ΜL (ref 0.6–4.47)
LYMPHOCYTES NFR BLD AUTO: 27 % (ref 14–44)
MCH RBC QN AUTO: 31 PG (ref 26.8–34.3)
MCHC RBC AUTO-ENTMCNC: 37 G/DL (ref 31.4–37.4)
MCV RBC AUTO: 84 FL (ref 82–98)
MONOCYTES # BLD AUTO: 0.32 THOUSAND/ΜL (ref 0.17–1.22)
MONOCYTES NFR BLD AUTO: 8 % (ref 4–12)
MUCOUS THREADS UR QL AUTO: ABNORMAL
NEUTROPHILS # BLD AUTO: 2.33 THOUSANDS/ΜL (ref 1.85–7.62)
NEUTS SEG NFR BLD AUTO: 59 % (ref 43–75)
NITRITE UR QL STRIP: NEGATIVE
NON-SQ EPI CELLS URNS QL MICRO: ABNORMAL /HPF
NRBC BLD AUTO-RTO: 0 /100 WBCS
P AXIS: 32 DEGREES
PH UR STRIP.AUTO: 5.5 [PH]
PLATELET # BLD AUTO: 172 THOUSANDS/UL (ref 149–390)
PMV BLD AUTO: 9.1 FL (ref 8.9–12.7)
POTASSIUM SERPL-SCNC: 4.2 MMOL/L (ref 3.5–5.3)
PR INTERVAL: 168 MS
PROT SERPL-MCNC: 7.4 G/DL (ref 6.4–8.4)
PROT UR STRIP-MCNC: ABNORMAL MG/DL
QRS AXIS: 71 DEGREES
QRSD INTERVAL: 86 MS
QT INTERVAL: 372 MS
QTC INTERVAL: 408 MS
RBC # BLD AUTO: 5.48 MILLION/UL (ref 3.88–5.62)
RBC #/AREA URNS AUTO: ABNORMAL /HPF
SODIUM SERPL-SCNC: 135 MMOL/L (ref 135–147)
SP GR UR STRIP.AUTO: 1.04 (ref 1–1.03)
T WAVE AXIS: 33 DEGREES
UROBILINOGEN UR STRIP-ACNC: <2 MG/DL
VENTRICULAR RATE: 72 BPM
WBC # BLD AUTO: 3.93 THOUSAND/UL (ref 4.31–10.16)
WBC #/AREA URNS AUTO: ABNORMAL /HPF

## 2022-07-25 PROCEDURE — 81001 URINALYSIS AUTO W/SCOPE: CPT | Performed by: EMERGENCY MEDICINE

## 2022-07-25 PROCEDURE — 74176 CT ABD & PELVIS W/O CONTRAST: CPT

## 2022-07-25 PROCEDURE — 80053 COMPREHEN METABOLIC PANEL: CPT | Performed by: EMERGENCY MEDICINE

## 2022-07-25 PROCEDURE — 36415 COLL VENOUS BLD VENIPUNCTURE: CPT | Performed by: EMERGENCY MEDICINE

## 2022-07-25 PROCEDURE — 99284 EMERGENCY DEPT VISIT MOD MDM: CPT

## 2022-07-25 PROCEDURE — 96365 THER/PROPH/DIAG IV INF INIT: CPT

## 2022-07-25 PROCEDURE — 99284 EMERGENCY DEPT VISIT MOD MDM: CPT | Performed by: EMERGENCY MEDICINE

## 2022-07-25 PROCEDURE — 85025 COMPLETE CBC W/AUTO DIFF WBC: CPT | Performed by: EMERGENCY MEDICINE

## 2022-07-25 PROCEDURE — 93010 ELECTROCARDIOGRAM REPORT: CPT | Performed by: INTERNAL MEDICINE

## 2022-07-25 PROCEDURE — 96375 TX/PRO/DX INJ NEW DRUG ADDON: CPT

## 2022-07-25 PROCEDURE — G1004 CDSM NDSC: HCPCS

## 2022-07-25 PROCEDURE — 93005 ELECTROCARDIOGRAM TRACING: CPT

## 2022-07-25 RX ORDER — KETOROLAC TROMETHAMINE 30 MG/ML
15 INJECTION, SOLUTION INTRAMUSCULAR; INTRAVENOUS ONCE
Status: COMPLETED | OUTPATIENT
Start: 2022-07-25 | End: 2022-07-25

## 2022-07-25 RX ORDER — CEFUROXIME AXETIL 500 MG/1
500 TABLET ORAL EVERY 12 HOURS SCHEDULED
Qty: 28 TABLET | Refills: 0 | Status: SHIPPED | OUTPATIENT
Start: 2022-07-25 | End: 2022-08-08

## 2022-07-25 RX ORDER — CEFTRIAXONE 1 G/50ML
1000 INJECTION, SOLUTION INTRAVENOUS ONCE
Status: COMPLETED | OUTPATIENT
Start: 2022-07-25 | End: 2022-07-25

## 2022-07-25 RX ORDER — ONDANSETRON 2 MG/ML
4 INJECTION INTRAMUSCULAR; INTRAVENOUS ONCE
Status: COMPLETED | OUTPATIENT
Start: 2022-07-25 | End: 2022-07-25

## 2022-07-25 RX ADMIN — KETOROLAC TROMETHAMINE 15 MG: 30 INJECTION, SOLUTION INTRAMUSCULAR at 07:39

## 2022-07-25 RX ADMIN — ONDANSETRON 4 MG: 2 INJECTION INTRAMUSCULAR; INTRAVENOUS at 07:40

## 2022-07-25 RX ADMIN — CEFTRIAXONE 1000 MG: 1 INJECTION, SOLUTION INTRAVENOUS at 09:47

## 2022-07-25 NOTE — DISCHARGE INSTRUCTIONS
You were seen in the Ed for flank pain  You were foudnt o have an infection of your urinary tract  You have been prescribed ceftin to be taken twice daily for 14 days  You had incidental findings in your liver where you had a mass, as well as a lesion on your bone at the level of S2 which will need followup imaging to be ordered per your PCP  Please come back to the ED if you develop any uncontrollable pain, loss of bowel or bladder function

## 2022-07-25 NOTE — Clinical Note
Jose Palmer was seen and treated in our emergency department on 7/25/2022  No restrictions            Diagnosis:     Linsey Zimmerman    He may return on this date: 07/27/2022         If you have any questions or concerns, please don't hesitate to call        Carlos Olguin MD    ______________________________           _______________          _______________  Hospital Representative                              Date                                Time

## 2022-07-25 NOTE — Clinical Note
Adrianaelaine Myles was seen and treated in our emergency department on 7/25/2022  No restrictions            Diagnosis:     Deandre Crowley    He may return on this date: 07/27/2022         If you have any questions or concerns, please don't hesitate to call        Channing Garvey MD    ______________________________           _______________          _______________  Hospital Representative                              Date                                Time

## 2022-07-25 NOTE — ED PROVIDER NOTES
History  Chief Complaint   Patient presents with    Flank Pain     Right sided flank pain since last night, h/I kidney stones  Feels like he cant fully empty bladder     This is a 76-year-old male patient presenting to the ED for complaint of right-sided flank pain  Patient states that he has had flank pain since last night  He states that his pain is a 6 to 7/10 in intensity, nonradiating, but associated with some dysuria and hesitancy  He denies any fever, chills, night sweats, nausea or vomiting  He denies any other significantly related symptoms  Patient states that his pain is reminiscent of 1 he has had nephrolithiasis in the past   He has never needed surgery, never needed a stent, and never needed to be hospitalized for kidney stones previously  Flank Pain  Associated symptoms: no chest pain, no chills, no cough, no dysuria, no fever, no hematuria, no shortness of breath, no sore throat and no vomiting        Prior to Admission Medications   Prescriptions Last Dose Informant Patient Reported? Taking?    Insulin Degludec-Liraglutide (XULTOPHY) 100 units-3 6 mg/mL injection pen   Yes No   Sig: inject (16UNITS) every morning; may increase by 2u every 3 days until B is below 120 in am   Insulin Pen Needle (PEN NEEDLES) 32G X 4 MM MISC   Yes No   Sig: inject once daily as directed   amLODIPine (NORVASC) 5 mg tablet   Yes No   ascorbic acid (VITAMIN C) 1000 MG tablet   No No   Sig: Take 1 tablet (1,000 mg total) by mouth every 12 (twelve) hours for 10 doses   atorvastatin (LIPITOR) 20 mg tablet   Yes No   Sig: Take 20 mg by mouth daily at bedtime   benazepril (LOTENSIN) 40 MG tablet   Yes No   cholecalciferol (VITAMIN D3) 1,000 units tablet   No No   Sig: Take 2 tablets (2,000 Units total) by mouth daily   clobetasol (TEMOVATE) 0 05 % cream   Yes No   Sig: APPLY UP TO TWICE DAILY TO SKIN OR ECZEMA   glucose blood test strip   Yes No   Sig: test blood sugar twice daily or prn   hydrocortisone 1 % lotion Yes No   Sig: APPLY AT BEDTIME   levothyroxine 200 mcg tablet   Yes No   Sig: Take 200 mcg by mouth daily   levothyroxine 25 mcg tablet   Yes No   Sig: Take 25 mcg by mouth daily   metFORMIN (GLUCOPHAGE) 1000 MG tablet   No No   Sig: Take 1 tablet (1,000 mg total) by mouth 2 (two) times a day with meals   Patient not taking: Reported on 4/26/2021   multivitamin-minerals (CENTRUM ADULTS) tablet   No No   Sig: Take 1 tablet by mouth daily   zinc sulfate (ZINCATE) 220 mg capsule   No No   Sig: Take 1 capsule (220 mg total) by mouth daily for 5 doses      Facility-Administered Medications: None       Past Medical History:   Diagnosis Date    Cancer ([facility] )     Thyroid cancer    Diabetes mellitus ([facility] )     Disease of thyroid gland     Hyperlipidemia     Hypertension        Past Surgical History:   Procedure Laterality Date    NECK SURGERY  04/21/2017    THYROIDECTOMY         Family History   Problem Relation Age of Onset    Diabetes type II Mother         MELLITUS    COPD Father     Cancer Father         MB 2/4/16    LYMPHOMA CANCER     I have reviewed and agree with the history as documented  E-Cigarette/Vaping    E-Cigarette Use Never User      E-Cigarette/Vaping Substances    Nicotine No     THC No     CBD No     Flavoring No     Other No     Unknown No      Social History     Tobacco Use    Smoking status: Never Smoker    Smokeless tobacco: Never Used   Vaping Use    Vaping Use: Never used   Substance Use Topics    Alcohol use: Never    Drug use: No       Review of Systems   Constitutional: Negative for chills and fever  HENT: Negative for ear pain and sore throat  Eyes: Negative for pain and visual disturbance  Respiratory: Negative for cough and shortness of breath  Cardiovascular: Negative for chest pain and palpitations  Gastrointestinal: Negative for abdominal pain and vomiting  Genitourinary: Positive for flank pain  Negative for dysuria and hematuria     Musculoskeletal: Negative for arthralgias and back pain  Skin: Negative for color change and rash  Neurological: Negative for seizures and syncope  All other systems reviewed and are negative  Physical Exam  Physical Exam  Vitals and nursing note reviewed  Constitutional:       General: He is not in acute distress  Appearance: Normal appearance  He is well-developed and normal weight  He is not ill-appearing or toxic-appearing  HENT:      Head: Normocephalic and atraumatic  Right Ear: External ear normal       Left Ear: External ear normal       Nose: Nose normal  No congestion or rhinorrhea  Mouth/Throat:      Mouth: Mucous membranes are moist       Pharynx: Oropharynx is clear  Eyes:      General: No scleral icterus  Conjunctiva/sclera: Conjunctivae normal    Cardiovascular:      Rate and Rhythm: Normal rate and regular rhythm  Pulses: Normal pulses  Heart sounds: Normal heart sounds  No murmur heard  Pulmonary:      Effort: Pulmonary effort is normal  No respiratory distress  Breath sounds: Normal breath sounds  No wheezing or rales  Abdominal:      General: Abdomen is flat  Bowel sounds are normal  There is no distension  Palpations: Abdomen is soft  There is no mass  Tenderness: There is no abdominal tenderness  There is right CVA tenderness  There is no left CVA tenderness  Musculoskeletal:         General: Normal range of motion  Cervical back: Normal range of motion and neck supple  No tenderness  Right lower leg: No edema  Left lower leg: No edema  Skin:     General: Skin is warm and dry  Capillary Refill: Capillary refill takes less than 2 seconds  Neurological:      General: No focal deficit present  Mental Status: He is alert and oriented to person, place, and time  Mental status is at baseline  Motor: No weakness     Psychiatric:         Mood and Affect: Mood normal          Behavior: Behavior normal          Thought Content: Thought content normal          Judgment: Judgment normal          Vital Signs  ED Triage Vitals [07/25/22 0647]   Temperature Pulse Respirations Blood Pressure SpO2   97 8 °F (36 6 °C) 92 18 145/95 98 %      Temp Source Heart Rate Source Patient Position - Orthostatic VS BP Location FiO2 (%)   Oral Monitor Sitting Right arm --      Pain Score       6           Vitals:    07/25/22 0647   BP: 145/95   Pulse: 92   Patient Position - Orthostatic VS: Sitting         Visual Acuity      ED Medications  Medications - No data to display    Diagnostic Studies  Results Reviewed     None                 No orders to display              Procedures  Procedures         ED Course  ED Course as of 07/25/22 1716   Mon Jul 25, 2022   0906 Urinalysis showing evidence for infection, no blood, will order CT scan  MDM  Number of Diagnoses or Management Options  Liver nodule  Pyelonephritis  Right flank pain  Diagnosis management comments: This is a 25-year-old male patient presented to the ED today for complaint of right-sided flank pain  Patient states that he has had symptoms since this morning  He has had associated nausea, but currently is not nauseous  He also complains of some occasional dysuria  He denies any fever or chills, night sweats, or any other significantly related symptoms  His physical exam is remarkable for right-sided flank pain  His differential diagnosis includes:  Nephro/ureterolithiasis versus urinary tract infection versus infected stone versus other  Patient had a urinalysis showing evidence for infection  He had a CT stone protocol showing no evidence for stone, or obstruction, or evidence of infection  He did have some incidental findings for which she will need to follow-up with his PCP, namely liver mass, large gallstone, as well as what is likely a bone island at S2  He is nontender over the spinous process of S2    Patient was given antibiotics for a pyelonephritis, and sent home with instructions to follow-up with his PCP within the next 1-2 weeks for continued management of his condition  Patient was given strict return precautions with which he agreed to comply  He was discharged in stable condition and felt safe going home  Patient was given a work note  Disposition  Final diagnoses:   None     ED Disposition     None      Follow-up Information    None         Patient's Medications   Discharge Prescriptions    No medications on file       No discharge procedures on file      PDMP Review     None          ED Provider  Electronically Signed by           Danielle Narvaez MD  07/25/22 4485

## 2022-07-27 ENCOUNTER — TELEPHONE (OUTPATIENT)
Dept: FAMILY MEDICINE CLINIC | Facility: CLINIC | Age: 47
End: 2022-07-27

## 2022-07-27 NOTE — TELEPHONE ENCOUNTER
FYI:  Patient called to find out if a mask is required in the office for his visit  I explained to him that due to the recent surge in [place] cases, our office and [facility] is requiring ALL patients to wear masks in the office  He said that Cinthya Pelaez told him that he didn't have to wear a mask when he comes back for his next appt  Patient said that he is done wearing his "freaking mask" and that if he didn't have to come see Cinthya Pelaez for this follow up he wouldn't be coming in at all

## 2022-07-28 ENCOUNTER — OFFICE VISIT (OUTPATIENT)
Dept: FAMILY MEDICINE CLINIC | Facility: CLINIC | Age: 47
End: 2022-07-28
Payer: COMMERCIAL

## 2022-07-28 VITALS
DIASTOLIC BLOOD PRESSURE: 74 MMHG | SYSTOLIC BLOOD PRESSURE: 116 MMHG | TEMPERATURE: 96 F | HEART RATE: 100 BPM | BODY MASS INDEX: 30.31 KG/M2 | HEIGHT: 68 IN | WEIGHT: 200 LBS | RESPIRATION RATE: 16 BRPM | OXYGEN SATURATION: 99 %

## 2022-07-28 DIAGNOSIS — K80.20 CALCULUS OF GALLBLADDER WITHOUT CHOLECYSTITIS WITHOUT OBSTRUCTION: ICD-10-CM

## 2022-07-28 DIAGNOSIS — K76.9 LIVER LESION: ICD-10-CM

## 2022-07-28 DIAGNOSIS — E11.65 UNCONTROLLED TYPE 2 DIABETES MELLITUS WITH HYPERGLYCEMIA (HCC): Primary | ICD-10-CM

## 2022-07-28 DIAGNOSIS — E89.0 POSTOPERATIVE HYPOTHYROIDISM: ICD-10-CM

## 2022-07-28 DIAGNOSIS — I10 ESSENTIAL HYPERTENSION: ICD-10-CM

## 2022-07-28 DIAGNOSIS — Z12.5 PROSTATE CANCER SCREENING: ICD-10-CM

## 2022-07-28 DIAGNOSIS — C73 MALIGNANT NEOPLASM OF THYROID GLAND (HCC): ICD-10-CM

## 2022-07-28 DIAGNOSIS — N12 PYELONEPHRITIS: ICD-10-CM

## 2022-07-28 DIAGNOSIS — E78.2 MIXED HYPERLIPIDEMIA: ICD-10-CM

## 2022-07-28 DIAGNOSIS — Z00.00 WELL ADULT EXAM: ICD-10-CM

## 2022-07-28 PROBLEM — M89.9 LESION OF PELVIC BONE: Status: ACTIVE | Noted: 2022-07-28

## 2022-07-28 LAB — SL AMB POCT HEMOGLOBIN AIC: 9 (ref ?–6.5)

## 2022-07-28 PROCEDURE — 83036 HEMOGLOBIN GLYCOSYLATED A1C: CPT | Performed by: PHYSICIAN ASSISTANT

## 2022-07-28 PROCEDURE — 99214 OFFICE O/P EST MOD 30 MIN: CPT | Performed by: PHYSICIAN ASSISTANT

## 2022-07-28 PROCEDURE — 99396 PREV VISIT EST AGE 40-64: CPT | Performed by: PHYSICIAN ASSISTANT

## 2022-07-28 RX ORDER — METFORMIN HYDROCHLORIDE 750 MG/1
750 TABLET, EXTENDED RELEASE ORAL
Qty: 90 TABLET | Refills: 1 | Status: SHIPPED | OUTPATIENT
Start: 2022-07-28 | End: 2022-10-07

## 2022-07-28 RX ORDER — BENAZEPRIL HYDROCHLORIDE 20 MG/1
20 TABLET ORAL DAILY
Qty: 90 TABLET | Refills: 1 | Status: SHIPPED | OUTPATIENT
Start: 2022-07-28 | End: 2022-07-28 | Stop reason: SDUPTHER

## 2022-07-28 RX ORDER — AMLODIPINE BESYLATE 5 MG/1
5 TABLET ORAL DAILY
Qty: 90 TABLET | Refills: 1 | Status: SHIPPED | OUTPATIENT
Start: 2022-07-28 | End: 2022-08-22

## 2022-07-28 RX ORDER — BENAZEPRIL HYDROCHLORIDE 20 MG/1
10 TABLET ORAL DAILY
Qty: 90 TABLET | Refills: 1 | Status: SHIPPED | OUTPATIENT
Start: 2022-07-28

## 2022-07-28 RX ORDER — ATORVASTATIN CALCIUM 20 MG/1
20 TABLET, FILM COATED ORAL
Qty: 90 TABLET | Refills: 1 | Status: SHIPPED | OUTPATIENT
Start: 2022-07-28

## 2022-07-28 NOTE — PROGRESS NOTES
Assessment/Plan:    -encouraged to eat a low-carbohydrate diet for his diabetes  -encouraged exercise  -encouraged to see the eye doctor yearly  Given a diabetic eye form  -routine physical in 1 year    M*Modal software was used to dictate this note  It may contain errors with dictating incorrect words/spelling  Please contact provider directly for any questions  Diagnoses and all orders for this visit:    Uncontrolled type 2 diabetes mellitus with hyperglycemia ([facility] )  -     POCT hemoglobin A1c    Well adult exam          Subjective:      Patient ID: Jose Bridges is a 49 y o  male  Patient presents today with his wife for routine follow-up/annual physical   His chronic medical conditions will be addressed as a separate visit  He does see the dentist regularly throughout the year  He has not seen an eye doctor in a while  He goes to [employer]  He does not eat a healthy diet  He does not exercise but he be remains very active  Denies smoking, alcohol, drug use or vaping      The following portions of the patient's history were reviewed and updated as appropriate:   He  has a past medical history of Cancer ([facility] ), Diabetes mellitus ([facility] ), Disease of thyroid gland, Hyperlipidemia, and Hypertension  He   Patient Active Problem List    Diagnosis Date Noted    Well adult exam 07/28/2022    Mixed hyperlipidemia 04/21/2021    Leukopenia 04/21/2021    Pneumonia due to COVID-19 virus 04/20/2021    Vir[facility] sepsis ([facility] ) 04/20/2021    Anxiety 03/19/2020    Uncontrolled type 2 diabetes mellitus with hyperglycemia ([facility] ) 03/19/2020    Insulin dependent diabetes mellitus 04/15/2019    Postoperative hypothyroidism 04/15/2019    Essential hypertension 04/15/2019     He  has a past surgical history that includes Neck surgery (04/21/2017) and Thyroidectomy  His family history includes COPD in his father; Cancer in his father; Diabetes type II in his mother  He  reports that he has never smoked   He has never used smokeless tobacco  He reports that he does not drink alcohol and does not use drugs    Current Outpatient Medications   Medication Sig Dispense Refill    levothyroxine 200 mcg tablet Take 200 mcg by mouth daily  6    levothyroxine 25 mcg tablet Take 25 mcg by mouth daily      amLODIPine (NORVASC) 5 mg tablet  (Patient not taking: Reported on 7/28/2022)      ascorbic acid (VITAMIN C) 1000 MG tablet Take 1 tablet (1,000 mg total) by mouth every 12 (twelve) hours for 10 doses 10 tablet 0    atorvastatin (LIPITOR) 20 mg tablet Take 20 mg by mouth daily at bedtime (Patient not taking: Reported on 7/28/2022)  5    benazepril (LOTENSIN) 40 MG tablet  (Patient not taking: Reported on 7/28/2022)      cefuroxime (CEFTIN) 500 mg tablet Take 1 tablet (500 mg total) by mouth every 12 (twelve) hours for 14 days (Patient not taking: Reported on 7/28/2022) 28 tablet 0    cholecalciferol (VITAMIN D3) 1,000 units tablet Take 2 tablets (2,000 Units total) by mouth daily (Patient not taking: Reported on 7/28/2022) 20 tablet 0    clobetasol (TEMOVATE) 0 05 % cream APPLY UP TO TWICE DAILY TO SKIN OR ECZEMA (Patient not taking: Reported on 7/28/2022)  0    glucose blood test strip test blood sugar twice daily or prn (Patient not taking: Reported on 7/28/2022)      hydrocortisone 1 % lotion APPLY AT BEDTIME (Patient not taking: Reported on 7/28/2022)  3    Insulin Degludec-Liraglutide (Insulin Degludec-Liraglutide) 100 units-3 6 mg/mL injection pen inject (16UNITS) every morning; may increase by 2u every 3 days until B is below 120 in am (Patient not taking: Reported on 7/28/2022)      Insulin Pen Needle (PEN NEEDLES) 32G X 4 MM MISC inject once daily as directed (Patient not taking: Reported on 7/28/2022)      metFORMIN (GLUCOPHAGE) 1000 MG tablet Take 1 tablet (1,000 mg total) by mouth 2 (two) times a day with meals (Patient not taking: No sig reported) 180 tablet 1    multivitamin-minerals (CENTRUM ADULTS) tablet Take 1 tablet by mouth daily (Patient not taking: Reported on 7/28/2022) 30 tablet 0    zinc sulfate (ZINCATE) 220 mg capsule Take 1 capsule (220 mg total) by mouth daily for 5 doses 5 capsule 0     No current facility-administered medications for this visit       Current Outpatient Medications on File Prior to Visit   Medication Sig    levothyroxine 200 mcg tablet Take 200 mcg by mouth daily    levothyroxine 25 mcg tablet Take 25 mcg by mouth daily    amLODIPine (NORVASC) 5 mg tablet  (Patient not taking: Reported on 7/28/2022)    ascorbic acid (VITAMIN C) 1000 MG tablet Take 1 tablet (1,000 mg total) by mouth every 12 (twelve) hours for 10 doses    atorvastatin (LIPITOR) 20 mg tablet Take 20 mg by mouth daily at bedtime (Patient not taking: Reported on 7/28/2022)    benazepril (LOTENSIN) 40 MG tablet  (Patient not taking: Reported on 7/28/2022)    cefuroxime (CEFTIN) 500 mg tablet Take 1 tablet (500 mg total) by mouth every 12 (twelve) hours for 14 days (Patient not taking: Reported on 7/28/2022)    cholecalciferol (VITAMIN D3) 1,000 units tablet Take 2 tablets (2,000 Units total) by mouth daily (Patient not taking: Reported on 7/28/2022)    clobetasol (TEMOVATE) 0 05 % cream APPLY UP TO TWICE DAILY TO SKIN OR ECZEMA (Patient not taking: Reported on 7/28/2022)    glucose blood test strip test blood sugar twice daily or prn (Patient not taking: Reported on 7/28/2022)    hydrocortisone 1 % lotion APPLY AT BEDTIME (Patient not taking: Reported on 7/28/2022)    Insulin Degludec-Liraglutide (Insulin Degludec-Liraglutide) 100 units-3 6 mg/mL injection pen inject (16UNITS) every morning; may increase by 2u every 3 days until B is below 120 in am (Patient not taking: Reported on 7/28/2022)    Insulin Pen Needle (PEN NEEDLES) 32G X 4 MM MISC inject once daily as directed (Patient not taking: Reported on 7/28/2022)    metFORMIN (GLUCOPHAGE) 1000 MG tablet Take 1 tablet (1,000 mg total) by mouth 2 (two) times a day with meals (Patient not taking: No sig reported)    multivitamin-minerals (CENTRUM ADULTS) tablet Take 1 tablet by mouth daily (Patient not taking: Reported on 7/28/2022)    zinc sulfate (ZINCATE) 220 mg capsule Take 1 capsule (220 mg total) by mouth daily for 5 doses     No current facility-administered medications on file prior to visit  He is allergic to no active allergies       Review of Systems      Objective:      /90 (BP Location: Left arm, Patient Position: Sitting, Cuff Size: Adult)   Pulse 100   Temp (!) 96 °F (35 6 °C)   Resp 16   Ht 5' 8" (1 727 m)   Wt 90 7 kg (200 lb)   SpO2 99%   BMI 30 41 kg/m²          Physical Exam  Constitutional:       General: He is not in acute distress  Appearance: Normal appearance  He is well-developed  He is not ill-appearing, toxic-appearing or diaphoretic  HENT:      Head: Normocephalic and atraumatic  Neck:      Thyroid: No thyromegaly  Cardiovascular:      Rate and Rhythm: Normal rate and regular rhythm  Heart sounds: Normal heart sounds  No murmur heard  Pulmonary:      Effort: Pulmonary effort is normal  No respiratory distress  Breath sounds: Normal breath sounds  No wheezing, rhonchi or rales  Abdominal:      General: Bowel sounds are normal       Palpations: Abdomen is soft  There is no mass  Tenderness: There is no abdominal tenderness  There is no right CVA tenderness or left CVA tenderness  Musculoskeletal:         General: No deformity  Cervical back: Normal range of motion and neck supple  Right lower leg: No edema  Left lower leg: No edema  Lymphadenopathy:      Cervical: No cervical adenopathy  Skin:     General: Skin is warm  Neurological:      General: No focal deficit present  Mental Status: He is alert  Psychiatric:         Mood and Affect: Mood normal          Behavior: Behavior normal          Thought Content:  Thought content normal          Judgment: Judgment normal

## 2022-07-28 NOTE — PATIENT INSTRUCTIONS
10% - bad control"> 10% - bad control,Hemoglobin A1c (HbA1c) greater than 10% indicating poor diabetic control,Haemoglobin A1c greater than 10% indicating poor diabetic control">   Diabetes Mellitus Type 2 in Adults, Ambulatory Care   GENERAL INFORMATION:   Diabetes mellitus type 2  is a disease that affects how your body uses glucose (sugar)  Insulin helps move sugar out of the blood so it can be used for energy  Normally, when the blood sugar level increases, the pancreas makes more insulin  Type 2 diabetes develops because either the body cannot make enough insulin, or it cannot use the insulin correctly  After many years, your pancreas may stop making insulin  Common symptoms include the following:   · More hunger or thirst than usual     · Frequent urination     · Weight loss without trying     · Blurred vision  Seek immediate care for the following symptoms:   · Severe abdominal pain, or pain that spreads to your back  You may also be vomiting  · Trouble staying awake or focusing    · Shaking or sweating    · Blurred or double vision    · Breath has a fruity, sweet smell    · Breathing is deep and labored, or rapid and shallow    · Heartbeat is fast and weak  Treatment for diabetes mellitus type 2  includes keeping your blood sugar at a normal level  You must eat the right foods, and exercise regularly  You may also need medicine if you cannot control your blood sugar level with nutrition and exercise  Manage diabetes mellitus type 2:   · Check your blood sugar level  You will be taught how to check a small drop of blood in a glucose monitor  Ask your healthcare provider when and how often to check during the day  Ask your healthcare provider what your blood sugar levels should be when you check them  · Keep track of carbohydrates (sugar and starchy foods)  Your blood sugar level can get too high if you eat too many carbohydrates   Your dietitian will help you plan meals and snacks that have the right amount of carbohydrates  · Eat low-fat foods  Some examples are skinless chicken and low-fat milk  · Eat less sodium (salt)  Some examples of high-sodium foods to limit are soy sauce, potato chips, and soup  Do not add salt to food you cook  Limit your use of table salt  · Eat high-fiber foods  Foods that are a good source of fiber include vegetables, whole grain bread, and beans  · Limit alcohol  Alcohol affects your blood sugar level and can make it harder to manage your diabetes  Women should limit alcohol to 1 drink a day  Men should limit alcohol to 2 drinks a day  A drink of alcohol is 12 ounces of beer, 5 ounces of wine, or 1½ ounces of liquor  · Get regular exercise  Exercise can help keep your blood sugar level steady, decrease your risk of heart disease, and help you lose weight  Exercise for at least 30 minutes, 5 days a week  Include muscle strengthening activities 2 days each week  Work with your healthcare provider to create an exercise plan  · Check your feet each day  for injuries or open sores  Ask your healthcare provider for activities you can do if you have an open sore  · Quit smoking  If you smoke, it is never too late to quit  Smoking can worsen the problems that may occur with diabetes  Ask your healthcare provider for information about how to stop smoking if you are having trouble quitting  · Ask about your weight:  Ask healthcare providers if you need to lose weight, and how much to lose  Ask them to help you with a weight loss program  Even a 10 to 15 pound weight loss can help you manage your blood sugar level  · Carry medical alert identification  Wear medical alert jewelry or carry a card that says you have diabetes  Ask your healthcare provider where to get these items  · Ask about vaccines  Diabetes puts you at risk of serious illness if you get the flu, pneumonia, or hepatitis   Ask your healthcare provider if you should get a flu, pneumonia, or hepatitis B vaccine, and when to get the vaccine  Follow up with your healthcare provider as directed:  Write down your questions so you remember to ask them during your visits  CARE AGREEMENT:   You have the right to help plan your care  Learn about your health condition and how it may be treated  Discuss treatment options with your caregivers to decide what care you want to receive  You always have the right to refuse treatment  The above information is an  only  It is not intended as medical advice for individual conditions or treatments  Talk to your doctor, nurse or pharmacist before following any medical regimen to see if it is safe and effective for you  © 2014 [address] is for End User's use only and may not be sold, redistributed or otherwise used for commercial purposes  All illustrations and images included in CareNotes® are the copyrighted property of [employer]  or [employer]  Type 2 Diabetes in Adults: New Diagnosis   AMBULATORY CARE:   Type 2 diabetes  is a disease that affects how your body uses glucose (sugar)  Normally, when the blood sugar level increases, the pancreas makes more insulin  Insulin helps move sugar out of the blood so it can be used for energy  Type 2 diabetes develops because either the body cannot make enough insulin, or it cannot use the insulin correctly  Type 2 diabetes can be controlled to prevent damage to your heart, blood vessels, and other organs  Common symptoms include the following:   · Numbness in your fingers or toes    · Blurred vision    · Frequent urination    · More hunger or thirst than usual    Have someone call your local emergency number (911 in the [address] [place] [address],[address]) if:   · You have any of the following signs of a stroke:      ? Numbness or drooping on one side of your face     ? Weakness in an arm or leg    ? Confusion or difficulty speaking    ?  Dizziness, a severe headache, or vision loss    · You have any of the following signs of a heart attack:      ? Squeezing, pressure, or pain in your chest    ? You may  also have any of the following:     § Discomfort or pain in your back, neck, jaw, stomach, or arm    § Shortness of breath    § Nausea or vomiting    § Lightheadedness or a sudden cold sweat    · You have trouble breathing  Seek care immediately if:   · You have severe abdominal pain  · You vomit for more than 2 hours  · You have trouble staying awake or focusing  · You are shaking or sweating  · You feel weak or more tired than usual     · You have blurred or double vision  · Your breath has a fruity, sweet smell  Call your doctor or diabetes care team if:   · Your arms and legs are swollen  · You have an upset stomach and cannot eat the foods on your meal plan  · You feel dizzy, have headaches, or are easily irritated  · Your skin is red, warm, dry, or swollen  · You have a wound that does not heal     · You have numbness in your arms or legs  · You have trouble coping with your illness, or you feel anxious or depressed  · You have questions or concerns about your condition or care  Treatment for type 2 diabetes  helps prevent or delay complications, including heart and kidney disease  You must eat healthy meals and do regular physical activity  You may  need any of the following:  · Diabetes medicines or insulin  may be given to decrease the amount of sugar in your blood  · Blood pressure medicine  may be given to lower your blood pressure  · Cholesterol-lowering medicine  may be given to prevent heart disease  · Antiplatelets , such as aspirin, help prevent blood clots  Take your antiplatelet medicine exactly as directed  These medicines make it more likely for you to bleed or bruise  If you are told to take aspirin, do not take acetaminophen or ibuprofen instead  · Take your medicine as directed    Contact your healthcare provider if you think your medicine is not helping or if you have side effects  Tell him or her if you are allergic to any medicine  Keep a list of the medicines, vitamins, and herbs you take  Include the amounts, and when and why you take them  Bring the list or the pill bottles to follow-up visits  Carry your medicine list with you in case of an emergency  Diabetes education:  Diabetes education will start right away  Diabetes education may also happen later to refresh your memory  Your diabetes care team may include physicians, nurse practitioners, and physician assistants  It may also include nurses, dietitians, exercise specialists, pharmacists, dentists, and podiatrists  Family members, or others who are close to you, may also be part of the team  You and your team will make goals and plans to manage diabetes and other health problems  The plans and goals will be specific to your needs  Members of your diabetes care team teach you the following:  · About nutrition:  A dietitian will help you make a meal plan to keep your blood sugar level steady  You will learn how food affects your blood sugar levels  You will also learn to keep track of sugar and starchy foods (carbohydrates)  Do not skip meals  Your blood sugar level may drop too low if you have taken diabetes medicine and do not eat  You may be taught to use the plate method for portion control  With the plate method, ½ of your plate contains vegetables  The other half is divided so that ¼ contains protein or meat, and ¼ contains starches, such as potatoes  · About physical activity and diabetes: You will learn why physical activity, such as walking, is important  You and your care team provider will make a plan for your activity  Your care team provider will tell you what a healthy weight is for you  He or she will help you make a plan to get to that weight and stay there   Maintain a healthy weight to help delay or prevent complications of diabetes  · About your blood sugar level: You will learn what your blood sugar level should be  You will be given information on when and how to check your blood sugar level  You may need to check by testing a drop of blood in a glucose monitor  You may instead be given a continuous glucose monitoring (CGM) device  A sensor is placed in your abdomen or on your arm  You put a transmitter on the sensor to get a reading that shows up on the monitor  You will learn what to do if your level is too high or too low  Write down the times of your checks and your levels  Take them to all follow-up appointments  · About diabetes medicine: You may need oral diabetes medicine, insulin, or both to help control your blood sugar levels  Your healthcare provider will teach you how and when to take oral diabetes medicine  You will also be taught about side effects oral diabetes medicine can cause  Insulin may be added if oral diabetes medicine becomes less effective over time  Insulin may be injected, or given through a pump or pen  You and your care team will discuss which method is best for you  ? An insulin pump  is an implanted device that gives your insulin 24 hours a day  An insulin pump prevents the need for multiple insulin injections in a day  ? An insulin pen  is a device prefilled with the right amount of insulin  ? You and your family members will be taught how to draw up and give insulin  if this is the best method for you  Your education team will also teach you how to dispose of needles and syringes  ? You will learn how much insulin you need  and when to give it  You will be taught when not to give insulin  You will also be taught what to do if your blood sugar level drops too low  This may happen if you take insulin and do not eat the right amount of carbohydrates      Other ways to help manage type 2 diabetes:   · Talk to your care team if you have increased stress about your diagnosis  When you feel stressed about your diagnosis, it can cause you not to take care of yourself properly  Your care team can help by offering tips about self-care  Your care team may suggest you talk to a mental health provider  The provider can listen and offer help with self-care issues  · Check your feet each day for sores  Wear shoes and socks that fit correctly  Do not trim your toenails  Go to a podiatrist  Ask your care team for more information about foot care  · Do not smoke  Nicotine and other chemicals in cigarettes and cigars can cause lung damage and make diabetes harder to manage  Ask your care team provider for information if you currently smoke and need help to quit  E-cigarettes or smokeless tobacco still contain nicotine  Talk to your care team provider before you use these products  · Drink water instead of sugary drinks such as soft drinks and fruit juices  Sugary drinks cause high blood sugar and cause an increase in your weight  Your healthcare provider may tell you that diet drinks do not help with weight loss  · Know the risks if you choose to drink alcohol  Alcohol can cause your blood sugar levels to be low if you use insulin  Alcohol can cause high blood sugar levels and weight gain if you drink too much  A drink of alcohol is 12 ounces of beer, 5 ounces of wine, or 1½ ounces of liquor  Your care team can tell you how many drinks are okay to have in 24 hours and in 1 week  · Check your blood pressure as directed  If you have high blood pressure (BP), talk to your care team about your BP goals  Together you can create a plan to lower your BP if needed and keep it in a healthy range  The plan may include lifestyle changes or medicines  A normal BP is 119/79 or lower  A normal blood pressure can help prevent or delay certain complications from diabetes  Examples include retinopathy (eye damage) and kidney damage           · Wear medical alert identification  Wear medical alert jewelry or carry a card that says you have diabetes  Ask your care team provider where to get these items  · Ask about vaccines you may need  You have a higher risk for serious illness if you get the flu, pneumonia, COVID-19, or hepatitis  Ask your provider if you should get a flu, pneumonia, or hepatitis B vaccine, and when to get the COVID-19 vaccine  Risks of type 2 diabetes: It is important to learn to keep your diabetes in control  Uncontrolled diabetes can damage your nerves, veins, and arteries  Your risk for dementia increases faster the longer your diabetes is not controlled  High blood sugar levels may damage other body tissues and organs over time  Damage to arteries may increase your risk for heart attack and stroke  Nerve damage may also lead to other heart, stomach, and nerve problems  Diabetes can become life-threatening if it is not controlled  Follow up with your care team providers as directed: You may need to return to have your A1c checked every 3 months  You will need to have your feet checked during at least 1 visit each year  You will need an eye exam 1 time each year to check for retinopathy  You will also need urine tests every year to check for kidney problems  You may need tests to monitor for heart disease, such as an EKG, stress test, blood pressure monitoring, and blood tests  Write down your questions so you remember to ask them during your visits  © Copyright [employer] 2022 Information is for End User's use only and may not be sold, redistributed or otherwise used for commercial purposes  All illustrations and images included in CareNotes® are the copyrighted property of [employer]  or Gerry Luis  The above information is an  only  It is not intended as medical advice for individual conditions or treatments   Talk to your doctor, nurse or pharmacist before following any medical regimen to see if it is safe and effective for you  Foot Care for People with Diabetes   AMBULATORY CARE:   What you need to know about foot care:   · Foot care helps protect your feet and prevent foot ulcers or sores  Long-term high blood sugar levels can damage the blood vessels and nerves in your legs and feet  This damage makes it hard to feel pressure, pain, temperature, and touch  You may not be able to feel a cut or sore, or shoes that are too tight  Foot care is needed to prevent serious problems, such as an infection or amputation  · Diabetes may cause your toes to become crooked or curved under  These changes may affect the way you walk and can lead to increased pressure on your foot  The pressure can decrease blood flow to your feet  Lack of blood flow increases your risk for a foot ulcer  Do not ignore small problems, such as dry skin or small wounds  These can become life-threatening over time without proper care  Call your care team provider if:   · Your feet become numb, weak, or hard to move  · You have pus draining from a sore on your foot  · You have a wound on your foot that gets bigger, deeper, or does not heal      · You see blisters, cuts, scratches, calluses, or sores on your foot  · You have a fever, and your feet become red, warm, and swollen  · Your toenails become thick, curled, or yellow  · You find it hard to check your feet because your vision is poor  · You have questions or concerns about your condition or care  How to care for your feet:   · Check your feet each day  Look at your whole foot, including the bottom, and between and under your toes  Check for wounds, corns, and calluses  Use a mirror to see the bottom of your feet  The skin on your feet may be shiny, tight, or darker than normal  Your feet may also be cold and pale  Feel your feet by running your hands along the tops, bottoms, sides, and between your toes   Redness, swelling, and warmth are signs of blood flow problems that can lead to a foot ulcer  Do not try to remove corns or calluses yourself  · Wash your feet each day with soap and warm water  Do not use hot water, because this can injure your foot  Dry your feet gently with a towel after you wash them  Dry between and under your toes  · Apply lotion or a moisturizer on your dry feet  Ask your care team provider what lotions are best to use  Do not put lotion or moisturizer between your toes  Moisture between your toes could lead to skin breakdown  · Cut your toenails correctly  File or cut your toenails straight across  Use a soft brush to clean around your toenails  If your toenails are very thick, you may need to have a care team provider or specialist cut them  · Protect your feet  Do not walk barefoot or wear your shoes without socks  Check your shoes for rocks or other objects that can hurt your feet  Wear cotton socks to help keep your feet dry  Wear socks without toe seams, or wear them with the seams inside out  Change your socks each day  Do not wear socks that are dirty or damp  · Wear shoes that fit well  Wear shoes that do not rub against any area of your feet  Your shoes should be ½ to ¾ inch (1 to 2 centimeters) longer than your feet  Your shoes should also have extra space around the widest part of your feet  Walking or athletic shoes with laces or straps that adjust are best  Ask your care team provider for help to choose shoes that fit you best  Ask him or her if you need to wear an insert, orthotic, or bandage on your feet  · Go to your follow-up visits  Your care team provider will do a foot exam at least once a year  You may need a foot exam more often if you have nerve damage, foot deformities, or ulcers  He will check for nerve damage and how well you can feel your feet  He will check your shoes to see if they fit well  · Do not smoke    Smoking can damage your blood vessels and put you at increased risk for foot ulcers  Ask your care team provider for information if you currently smoke and need help to quit  E-cigarettes or smokeless tobacco still contain nicotine  Talk to your care team provider before you use these products  Follow up with your diabetes care team provider or foot specialist as directed: You will need to have your feet checked at least once a year  You may need a foot exam more often if you have nerve damage, foot deformities, or ulcers  Write down your questions so you remember to ask them during your visits  © Copyright [employer] 2022 Information is for End User's use only and may not be sold, redistributed or otherwise used for commercial purposes  All illustrations and images included in CareNotes® are the copyrighted property of [employer] M [employer] Inc  or [facility] Catie Gallardo   The above information is an  only  It is not intended as medical advice for individual conditions or treatments  Talk to your doctor, nurse or pharmacist before following any medical regimen to see if it is safe and effective for you

## 2022-07-28 NOTE — PROGRESS NOTES
Assessment/Plan:    Recent ER note has been reviewed but I was not able to find the antibiotic that he is currently on  -he will complete the full course of the antibiotic since he has been noticing improvement with the pyelonephritis  -CT scan reveals a liver lesion  I did order an MRI  -he does have a gallstone at over 4 cm  He will call a surgeon that he trusts and sees regularly  -CBC and CMP reviewed from several days ago  -ordered a TSH, lipid and PSA  -he does have a pelvic lesion on the CT scan  This is why the PSA is recommended  Recommend he repeat the CT scan in 6 months/end of January/February  -today's hemoglobin A1c is 9 0  The importance of lowering his blood sugar has been discussed  I did discuss complications including cardiac, kidney failure, blindness, infections, nerve damage  -patient refuses foot exam today  -I did encourage him to get a diabetic eye exam   He usually goes to [facility]  -I did lower his benazepril from 40 mg down to 10 mg for renal protection since his blood pressure is good at this time  Hold amlodipine 5 mg daily  -start metformin  mg daily  Needs a once daily dose verses twice daily  -restart statin  -I did recommend follow-up in 3 months    M*Modal software was used to dictate this note  It may contain errors with dictating incorrect words/spelling  Please contact provider directly for any questions  Diagnoses and all orders for this visit:    Uncontrolled type 2 diabetes mellitus with hyperglycemia (HCC)  -     POCT hemoglobin A1c  -     Lipid panel  -     metFORMIN (GLUCOPHAGE-XR) 750 mg 24 hr tablet; Take 1 tablet (750 mg total) by mouth daily with breakfast    Well adult exam    Postoperative hypothyroidism  -     TSH, 3rd generation; Future    Essential hypertension  -     amLODIPine (NORVASC) 5 mg tablet; Take 1 tablet (5 mg total) by mouth daily  -     Discontinue: benazepril (LOTENSIN) 20 mg tablet;  Take 1 tablet (20 mg total) by mouth daily  -     benazepril (LOTENSIN) 20 mg tablet; Take 0 5 tablets (10 mg total) by mouth daily    Mixed hyperlipidemia  -     Lipid panel  -     atorvastatin (LIPITOR) 20 mg tablet; Take 1 tablet (20 mg total) by mouth daily at bedtime    Liver lesion  -     MRI abdomen w wo contrast; Future    Malignant neoplasm of thyroid gland (HCC)    Prostate cancer screening  -     PSA, Total Screen; Future    Calculus of gallbladder without cholecystitis without obstruction    Pyelonephritis          Subjective:      Patient ID: Keshia Soto is a 49 y o  male  Patient presents today with his wife for routine follow-up for diabetes  He has not taking his medications in quite a while  His wife states he has a hard time remembering to take his medicines  He was recently in the emergency room for pyelonephritis  He does not remember the name of the antibiotic but he is on a for 2 weeks  He does not check his blood sugars at home  The following portions of the patient's history were reviewed and updated as appropriate:   He  has a past medical history of Cancer ([facility] [address] ), Diabetes mellitus ([facility] [address] ), Disease of thyroid gland, Hyperlipidemia, and Hypertension    He   Patient Active Problem List    Diagnosis Date Noted    Well adult exam 07/28/2022    Liver lesion 07/28/2022    Malignant neoplasm of thyroid gland ([facility] [address] ) 07/28/2022    Prostate cancer screening 07/28/2022    Calculus of gallbladder without cholecystitis without obstruction 07/28/2022    Lesion of pelvic bone 07/28/2022    Pyelonephritis 07/28/2022    Mixed hyperlipidemia 04/21/2021    Leukopenia 04/21/2021    Pneumonia due to COVID-19 virus 04/20/2021    Viral sepsis ([address] ) 04/20/2021    Anxiety 03/19/2020    Uncontrolled type 2 diabetes mellitus with hyperglycemia ([address] ) 03/19/2020    Insulin dependent diabetes mellitus 04/15/2019    Postoperative hypothyroidism 04/15/2019    Essential hypertension 04/15/2019     He  has a past surgical history that includes Neck surgery (04/21/2017) and Thyroidectomy  His family history includes COPD in his father; Cancer in his father; Diabetes type II in his mother  He  reports that he has never smoked  He has never used smokeless tobacco  He reports that he does not drink alcohol and does not use drugs    Current Outpatient Medications   Medication Sig Dispense Refill    amLODIPine (NORVASC) 5 mg tablet Take 1 tablet (5 mg total) by mouth daily 90 tablet 1    atorvastatin (LIPITOR) 20 mg tablet Take 1 tablet (20 mg total) by mouth daily at bedtime 90 tablet 1    benazepril (LOTENSIN) 20 mg tablet Take 0 5 tablets (10 mg total) by mouth daily 90 tablet 1    levothyroxine 200 mcg tablet Take 200 mcg by mouth daily  6    levothyroxine 25 mcg tablet Take 25 mcg by mouth daily      metFORMIN (GLUCOPHAGE-XR) 750 mg 24 hr tablet Take 1 tablet (750 mg total) by mouth daily with breakfast 90 tablet 1    ascorbic acid (VITAMIN C) 1000 MG tablet Take 1 tablet (1,000 mg total) by mouth every 12 (twelve) hours for 10 doses 10 tablet 0    cefuroxime (CEFTIN) 500 mg tablet Take 1 tablet (500 mg total) by mouth every 12 (twelve) hours for 14 days (Patient not taking: Reported on 7/28/2022) 28 tablet 0    cholecalciferol (VITAMIN D3) 1,000 units tablet Take 2 tablets (2,000 Units total) by mouth daily (Patient not taking: Reported on 7/28/2022) 20 tablet 0    clobetasol (TEMOVATE) 0 05 % cream APPLY UP TO TWICE DAILY TO SKIN OR ECZEMA (Patient not taking: Reported on 7/28/2022)  0    glucose blood test strip test blood sugar twice daily or prn (Patient not taking: Reported on 7/28/2022)      hydrocortisone 1 % lotion APPLY AT BEDTIME (Patient not taking: Reported on 7/28/2022)  3    Insulin Degludec-Liraglutide (Insulin Degludec-Liraglutide) 100 units-3 6 mg/mL injection pen inject (16UNITS) every morning; may increase by 2u every 3 days until B is below 120 in am (Patient not taking: Reported on 7/28/2022)      Insulin Pen Needle (PEN NEEDLES) 32G X 4 MM MISC inject once daily as directed (Patient not taking: Reported on 7/28/2022)      metFORMIN (GLUCOPHAGE) 1000 MG tablet Take 1 tablet (1,000 mg total) by mouth 2 (two) times a day with meals (Patient not taking: No sig reported) 180 tablet 1    multivitamin-minerals (CENTRUM ADULTS) tablet Take 1 tablet by mouth daily (Patient not taking: Reported on 7/28/2022) 30 tablet 0    zinc sulfate (ZINCATE) 220 mg capsule Take 1 capsule (220 mg total) by mouth daily for 5 doses 5 capsule 0     No current facility-administered medications for this visit       Current Outpatient Medications on File Prior to Visit   Medication Sig    levothyroxine 200 mcg tablet Take 200 mcg by mouth daily    levothyroxine 25 mcg tablet Take 25 mcg by mouth daily    ascorbic acid (VITAMIN C) 1000 MG tablet Take 1 tablet (1,000 mg total) by mouth every 12 (twelve) hours for 10 doses    cefuroxime (CEFTIN) 500 mg tablet Take 1 tablet (500 mg total) by mouth every 12 (twelve) hours for 14 days (Patient not taking: Reported on 7/28/2022)    cholecalciferol (VITAMIN D3) 1,000 units tablet Take 2 tablets (2,000 Units total) by mouth daily (Patient not taking: Reported on 7/28/2022)    clobetasol (TEMOVATE) 0 05 % cream APPLY UP TO TWICE DAILY TO SKIN OR ECZEMA (Patient not taking: Reported on 7/28/2022)    glucose blood test strip test blood sugar twice daily or prn (Patient not taking: Reported on 7/28/2022)    hydrocortisone 1 % lotion APPLY AT BEDTIME (Patient not taking: Reported on 7/28/2022)    Insulin Degludec-Liraglutide (Insulin Degludec-Liraglutide) 100 units-3 6 mg/mL injection pen inject (16UNITS) every morning; may increase by 2u every 3 days until B is below 120 in am (Patient not taking: Reported on 7/28/2022)    Insulin Pen Needle (PEN NEEDLES) 32G X 4 MM MISC inject once daily as directed (Patient not taking: Reported on 7/28/2022)    metFORMIN (GLUCOPHAGE) 1000 MG tablet Take 1 tablet (1,000 mg total) by mouth 2 (two) times a day with meals (Patient not taking: No sig reported)    multivitamin-minerals (CENTRUM ADULTS) tablet Take 1 tablet by mouth daily (Patient not taking: Reported on 7/28/2022)    zinc sulfate (ZINCATE) 220 mg capsule Take 1 capsule (220 mg total) by mouth daily for 5 doses    [DISCONTINUED] amLODIPine (NORVASC) 5 mg tablet  (Patient not taking: Reported on 7/28/2022)    [DISCONTINUED] atorvastatin (LIPITOR) 20 mg tablet Take 20 mg by mouth daily at bedtime (Patient not taking: Reported on 7/28/2022)    [DISCONTINUED] benazepril (LOTENSIN) 40 MG tablet  (Patient not taking: Reported on 7/28/2022)     No current facility-administered medications on file prior to visit  He is allergic to no active allergies       Review of Systems   Constitutional: Negative for chills and fever  Respiratory: Negative for cough and shortness of breath  Cardiovascular: Negative for chest pain and leg swelling  Genitourinary: Negative for difficulty urinating and dysuria  Objective:      /74 (BP Location: Left arm, Patient Position: Sitting, Cuff Size: Adult)   Pulse 100   Temp (!) 96 °F (35 6 °C)   Resp 16   Ht 5' 8" (1 727 m)   Wt 90 7 kg (200 lb)   SpO2 99%   BMI 30 41 kg/m²          Physical Exam  Constitutional:       General: He is not in acute distress  Appearance: Normal appearance  He is well-developed  He is not ill-appearing, toxic-appearing or diaphoretic  HENT:      Head: Normocephalic and atraumatic  Neck:      Thyroid: No thyromegaly  Cardiovascular:      Rate and Rhythm: Normal rate and regular rhythm  Heart sounds: Normal heart sounds  No murmur heard  Pulmonary:      Effort: Pulmonary effort is normal  No respiratory distress  Breath sounds: Normal breath sounds  No wheezing, rhonchi or rales  Abdominal:      General: Bowel sounds are normal       Palpations: Abdomen is soft  There is no mass  Tenderness:  There is no abdominal tenderness  There is no right CVA tenderness or left CVA tenderness  Musculoskeletal:         General: No deformity  Cervical back: Normal range of motion and neck supple  Right lower leg: No edema  Left lower leg: No edema  Lymphadenopathy:      Cervical: No cervical adenopathy  Skin:     General: Skin is warm  Neurological:      General: No focal deficit present  Mental Status: He is alert  Psychiatric:         Mood and Affect: Mood normal          Behavior: Behavior normal          Thought Content:  Thought content normal          Judgment: Judgment normal

## 2022-08-04 ENCOUNTER — APPOINTMENT (OUTPATIENT)
Dept: RADIOLOGY | Facility: AMBULARY SURGERY CENTER | Age: 47
End: 2022-08-04
Attending: ORTHOPAEDIC SURGERY
Payer: COMMERCIAL

## 2022-08-04 ENCOUNTER — OFFICE VISIT (OUTPATIENT)
Dept: OBGYN CLINIC | Facility: CLINIC | Age: 47
End: 2022-08-04
Payer: COMMERCIAL

## 2022-08-04 VITALS
WEIGHT: 200 LBS | HEIGHT: 68 IN | DIASTOLIC BLOOD PRESSURE: 76 MMHG | SYSTOLIC BLOOD PRESSURE: 113 MMHG | HEART RATE: 87 BPM | BODY MASS INDEX: 30.31 KG/M2

## 2022-08-04 DIAGNOSIS — M25.561 RIGHT KNEE PAIN, UNSPECIFIED CHRONICITY: ICD-10-CM

## 2022-08-04 DIAGNOSIS — M17.11 PATELLOFEMORAL ARTHRITIS OF RIGHT KNEE: Primary | ICD-10-CM

## 2022-08-04 PROCEDURE — 73562 X-RAY EXAM OF KNEE 3: CPT

## 2022-08-04 PROCEDURE — 99204 OFFICE O/P NEW MOD 45 MIN: CPT | Performed by: ORTHOPAEDIC SURGERY

## 2022-08-04 PROCEDURE — 20610 DRAIN/INJ JOINT/BURSA W/O US: CPT | Performed by: ORTHOPAEDIC SURGERY

## 2022-08-04 RX ORDER — BETAMETHASONE SODIUM PHOSPHATE AND BETAMETHASONE ACETATE 3; 3 MG/ML; MG/ML
12 INJECTION, SUSPENSION INTRA-ARTICULAR; INTRALESIONAL; INTRAMUSCULAR; SOFT TISSUE
Status: COMPLETED | OUTPATIENT
Start: 2022-08-04 | End: 2022-08-04

## 2022-08-04 RX ORDER — BUPIVACAINE HYDROCHLORIDE 2.5 MG/ML
1 INJECTION, SOLUTION INFILTRATION; PERINEURAL
Status: COMPLETED | OUTPATIENT
Start: 2022-08-04 | End: 2022-08-04

## 2022-08-04 RX ORDER — LIDOCAINE HYDROCHLORIDE 10 MG/ML
1 INJECTION, SOLUTION INFILTRATION; PERINEURAL
Status: COMPLETED | OUTPATIENT
Start: 2022-08-04 | End: 2022-08-04

## 2022-08-04 RX ADMIN — LIDOCAINE HYDROCHLORIDE 1 ML: 10 INJECTION, SOLUTION INFILTRATION; PERINEURAL at 10:00

## 2022-08-04 RX ADMIN — BUPIVACAINE HYDROCHLORIDE 1 ML: 2.5 INJECTION, SOLUTION INFILTRATION; PERINEURAL at 10:00

## 2022-08-04 RX ADMIN — BETAMETHASONE SODIUM PHOSPHATE AND BETAMETHASONE ACETATE 12 MG: 3; 3 INJECTION, SUSPENSION INTRA-ARTICULAR; INTRALESIONAL; INTRAMUSCULAR; SOFT TISSUE at 10:00
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
## 2022-08-04 NOTE — PATIENT INSTRUCTIONS
PATELLOFEMORAL SYNDROME-Jauregui TAPING TECHNIQUE    Search Leukotape P tape and Cover roll stretch tape on  [address] [facility] [address]  com  Leukotape P is typically 1 5in x 15 yards, Cover roll stretch tape is typically 2in x 10 yards        How to apply:  Place cover roll tape over knee cap  This protects the skin  Apply Leukotape over the cover roll tape  Use the Leukotape to pull the knee cap to the middle of the body (medial side of knee) to prevent lateral (outside) tracking of the knee cap  Wear the tape for 3 days (72 hrs) straight, then take off one day (24 hrs) off, then repeat  Visit youtube  com and search Jauregui tape for the knee to watch a video on how to apply tape  Video titled Jauregui Taping of the knee created by [employer] recommended  What does Jauregui taping technique do? Patellofemoral syndrome is when the inside quadriceps muscle, called the VMO muscle, becomes weak due to a number of factors  This causes the Patellofemoral ligament, which is the only ligament holding the patella (knee cap) in place, to become weak as well  When the ligament becomes weak, the knee cap drifts or tracks too far to the lateral side of the knee (outside knee) which causes tension on this ligament  The knee cap hits the lateral area of the femur, resulting in pain or discomfort around the front of the knee  Physical Therapy is sometimes used to strengthen the weak muscles, such as the VMO, to correct this problem  When the tape is applied correctly, it helps to realign the knee cap to the center of the knee  This helps correct for the lateral tracking of the knee cap and relieve discomfort  You can search online for exercises that can help strengthen the VMO quadriceps muscle or attend physical therapy

## 2022-08-09 ENCOUNTER — CONSULT (OUTPATIENT)
Dept: SURGERY | Facility: CLINIC | Age: 47
End: 2022-08-09
Payer: COMMERCIAL

## 2022-08-09 VITALS
HEIGHT: 68 IN | SYSTOLIC BLOOD PRESSURE: 122 MMHG | HEART RATE: 100 BPM | TEMPERATURE: 97 F | DIASTOLIC BLOOD PRESSURE: 84 MMHG | WEIGHT: 197 LBS | BODY MASS INDEX: 29.86 KG/M2 | OXYGEN SATURATION: 98 %

## 2022-08-09 DIAGNOSIS — K80.10 CALCULOUS CHOLECYSTITIS: Primary | ICD-10-CM

## 2022-08-09 PROCEDURE — 99204 OFFICE O/P NEW MOD 45 MIN: CPT | Performed by: SPECIALIST

## 2022-08-09 PROCEDURE — 3074F SYST BP LT 130 MM HG: CPT | Performed by: SPECIALIST

## 2022-08-09 PROCEDURE — 3079F DIAST BP 80-89 MM HG: CPT | Performed by: SPECIALIST

## 2022-08-09 NOTE — LETTER
August 10, 2022     Talya Grant PA-C  [address]    Patient: Wade Rodriguez   YOB: 1975   Date of Visit: 8/9/2022       Dear Ms Baird,    Thank you for referring Maggie Vaca to me for evaluation  Below are the relevant portions of my H&P  If you have questions, please do not hesitate to call me  I look forward to following Thomas Marisol along with you  Sincerely,    Garret Soriano MD        CC: No Recipients  Nae Allison MD  8/10/2022 12:44 PM  Signed  Chief Complaint:  Symptomatic gallstones      History of Present Illness:  Patient 50-year-old white male previous patient of ours on whom we performed a total thyroidectomy with lymphadenectomy for carcinoma of the thyroid in April 2017  He has done well from this  Recently developed some right flank pain  He was seen in the emergency room at [address]  and a CT scan of the abdomen and pelvis was performed to rule out a kidney stone  No renal or ureteral calculi was identified at that time but he was noted to have a large calcified gallstone  He was discharged but he has persistent recurrent right upper quadrant right flank pain since then  He presents to the office today with his wife Cassondra Severin to discuss cholecystectomy  He does admit to bloating and occasional nausea and of course intermittent right upper quadrant abdominal pain  Pain right upper quadrant radiates to the right flank  Past Medical History:   Past Medical History:   Diagnosis Date    Cancer [facility])     Thyroid cancer    Diabetes mellitus ([facility] [address] )     Disease of thyroid gland     Hyperlipidemia     Hypertension          Past Surgical History:    Past Surgical History:   Procedure Laterality Date    NECK SURGERY  04/21/2017    THYROIDECTOMY           Allergies:     Allergies   Allergen Reactions    No Active Allergies          Medications:    Current Outpatient Medications:     atorvastatin (LIPITOR) 20 mg tablet, Take 1 tablet (20 mg total) by mouth daily at bedtime, Disp: 90 tablet, Rfl: 1    levothyroxine 200 mcg tablet, Take 200 mcg by mouth daily, Disp: , Rfl: 6    levothyroxine 25 mcg tablet, Take 25 mcg by mouth daily, Disp: , Rfl:     metFORMIN (GLUCOPHAGE-XR) 750 mg 24 hr tablet, Take 1 tablet (750 mg total) by mouth daily with breakfast, Disp: 90 tablet, Rfl: 1    amLODIPine (NORVASC) 5 mg tablet, Take 1 tablet (5 mg total) by mouth daily (Patient not taking: Reported on 8/9/2022), Disp: 90 tablet, Rfl: 1    ascorbic acid (VITAMIN C) 1000 MG tablet, Take 1 tablet (1,000 mg total) by mouth every 12 (twelve) hours for 10 doses, Disp: 10 tablet, Rfl: 0    benazepril (LOTENSIN) 20 mg tablet, Take 0 5 tablets (10 mg total) by mouth daily (Patient not taking: Reported on 8/9/2022), Disp: 90 tablet, Rfl: 1    cholecalciferol (VITAMIN D3) 1,000 units tablet, Take 2 tablets (2,000 Units total) by mouth daily (Patient not taking: No sig reported), Disp: 20 tablet, Rfl: 0    clobetasol (TEMOVATE) 0 05 % cream, APPLY UP TO TWICE DAILY TO SKIN OR ECZEMA (Patient not taking: No sig reported), Disp: , Rfl: 0    glucose blood test strip, test blood sugar twice daily or prn (Patient not taking: No sig reported), Disp: , Rfl:     hydrocortisone 1 % lotion, APPLY AT BEDTIME (Patient not taking: No sig reported), Disp: , Rfl: 3    Insulin Degludec-Liraglutide (Insulin Degludec-Liraglutide) 100 units-3 6 mg/mL injection pen, inject (16UNITS) every morning; may increase by 2u every 3 days until B is below 120 in am (Patient not taking: No sig reported), Disp: , Rfl:     Insulin Pen Needle (PEN NEEDLES) 32G X 4 MM MISC, inject once daily as directed (Patient not taking: No sig reported), Disp: , Rfl:     metFORMIN (GLUCOPHAGE) 1000 MG tablet, Take 1 tablet (1,000 mg total) by mouth 2 (two) times a day with meals (Patient not taking: No sig reported), Disp: 180 tablet, Rfl: 1    multivitamin-minerals (CENTRUM ADULTS) tablet, Take 1 tablet by mouth daily (Patient not taking: No sig reported), Disp: 30 tablet, Rfl: 0    zinc sulfate (ZINCATE) 220 mg capsule, Take 1 capsule (220 mg total) by mouth daily for 5 doses, Disp: 5 capsule, Rfl: 0      Social History:  Social History     Social History     Substance and Sexual Activity   Alcohol Use Never     Social History     Substance and Sexual Activity   Drug Use No     Social History     Tobacco Use   Smoking Status Never Smoker   Smokeless Tobacco Never Used         Family History:    Family History   Problem Relation Age of Onset    Diabetes type II Mother         MELLITUS    COPD Father     Cancer Father         MB 2/4/16    LYMPHOMA CANCER         Review of Systems:    As per the HPI  No weight loss weight gain fever chills night sweats chest pain nausea vomiting diarrhea constipation shortness of breath headaches blurry vision double vision sore throat chronic cough etcetera  Vitals:  Vitals:    08/09/22 1518   BP: 122/84   Pulse: 100   Temp: (!) 97 °F (36 1 °C)   SpO2: 98%       Physical Exam:  Patient is a healthy-appearing middle-aged white male 5 ft 8 in 197 lb  He is awake alert no distress  Vital signs as above    Skin warm dry  Head normocephalic and atraumatic  Eyes KRISTI a m  Intact  Ears nose within normal limits  Throat gag reflex intact  Neck there is a scar, transverse, just above the suprasternal notch freed appears to be well healed  There is no evidence of neck mass or lymphadenopathy noted  Back no CVA or spinal tenderness  Lungs clear to a and P  Cor regular rate and rhythm no murmurs carotid bruits  Abdomen soft flat he has some mild right upper quadrant tenderness present no palpable masses or hernias noted  Extremities negative CC E  Neurologically A&O x3 cranial nerves 2-12 intact  Lymphatics no lymphadenopathy palpable  Lab Results: I have personally reviewed pertinent reports  See below    Imaging: I have personally reviewed pertinent imaging studies primarily his CT scan of the abdomen and pelvis  EKG, Pathology, and Other Studies: I have personally reviewed pertinent reports  No visits with results within 1 Day(s) from this visit  Latest known visit with results is:   Office Visit on 07/28/2022   Component Date Value    Hemoglobin A1C 07/28/2022 9 0 (A)         Impression:  Symptomatic gallstones  The patient has a 4 5 cm calcified gallstone in his gallbladder  It would most likely benefit from laparoscopic cholecystectomy  Plan:  Repeat some laboratory values  Scheduled for laparoscopic cholecystectomy at the earliest possible date

## 2022-08-10 NOTE — H&P (VIEW-ONLY)
Chief Complaint:  Symptomatic gallstones      History of Present Illness:  Patient 42-year-old white male previous patient of ours on whom we performed a total thyroidectomy with lymphadenectomy for carcinoma of the thyroid in April 2017  He has done well from this  Recently developed some right flank pain  He was seen in the emergency room at [address] [place] [address] and a CT scan of the abdomen and pelvis was performed to rule out a kidney stone  No renal or ureteral calculi was identified at that time but he was noted to have a large calcified gallstone  He was discharged but he has persistent recurrent right upper quadrant right flank pain since then  He presents to the office today with his wife Sumaya Doe to discuss cholecystectomy  He does admit to bloating and occasional nausea and of course intermittent right upper quadrant abdominal pain  Pain right upper quadrant radiates to the right flank  Past Medical History:   Past Medical History:   Diagnosis Date    Cancer [facility])     Thyroid cancer    [facility] mellitus ([facility] [address] )     Disease of thyroid gland     Hyperlipidemia     Hypertension          Past Surgical History:    Past Surgical History:   Procedure Laterality Date    NECK SURGERY  04/21/2017    THYROIDECTOMY           Allergies:     Allergies   Allergen Reactions    No Active Allergies          Medications:    Current Outpatient Medications:     atorvastatin (LIPITOR) 20 mg tablet, Take 1 tablet (20 mg total) by mouth daily at bedtime, Disp: 90 tablet, Rfl: 1    levothyroxine 200 mcg tablet, Take 200 mcg by mouth daily, Disp: , Rfl: 6    levothyroxine 25 mcg tablet, Take 25 mcg by mouth daily, Disp: , Rfl:     metFORMIN (GLUCOPHAGE-XR) 750 mg 24 hr tablet, Take 1 tablet (750 mg total) by mouth daily with breakfast, Disp: 90 tablet, Rfl: 1    amLODIPine (NORVASC) 5 mg tablet, Take 1 tablet (5 mg total) by mouth daily (Patient not taking: Reported on 8/9/2022), Disp: 90 tablet, Rfl: 1    ascorbic acid (VITAMIN C) 1000 MG tablet, Take 1 tablet (1,000 mg total) by mouth every 12 (twelve) hours for 10 doses, Disp: 10 tablet, Rfl: 0    benazepril (LOTENSIN) 20 mg tablet, Take 0 5 tablets (10 mg total) by mouth daily (Patient not taking: Reported on 8/9/2022), Disp: 90 tablet, Rfl: 1    cholecalciferol (VITAMIN D3) 1,000 units tablet, Take 2 tablets (2,000 Units total) by mouth daily (Patient not taking: No sig reported), Disp: 20 tablet, Rfl: 0    clobetasol (TEMOVATE) 0 05 % cream, APPLY UP TO TWICE DAILY TO SKIN OR ECZEMA (Patient not taking: No sig reported), Disp: , Rfl: 0    glucose blood test strip, test blood sugar twice daily or prn (Patient not taking: No sig reported), Disp: , Rfl:     hydrocortisone 1 % lotion, APPLY AT BEDTIME (Patient not taking: No sig reported), Disp: , Rfl: 3    Insulin Degludec-Liraglutide (Insulin Degludec-Liraglutide) 100 units-3 6 mg/mL injection pen, inject (16UNITS) every morning; may increase by 2u every 3 days until B is below 120 in am (Patient not taking: No sig reported), Disp: , Rfl:     Insulin Pen Needle (PEN NEEDLES) 32G X 4 MM MISC, inject once daily as directed (Patient not taking: No sig reported), Disp: , Rfl:     metFORMIN (GLUCOPHAGE) 1000 MG tablet, Take 1 tablet (1,000 mg total) by mouth 2 (two) times a day with meals (Patient not taking: No sig reported), Disp: 180 tablet, Rfl: 1    multivitamin-minerals (CENTRUM ADULTS) tablet, Take 1 tablet by mouth daily (Patient not taking: No sig reported), Disp: 30 tablet, Rfl: 0    zinc sulfate (ZINCATE) 220 mg capsule, Take 1 capsule (220 mg total) by mouth daily for 5 doses, Disp: 5 capsule, Rfl: 0      Social History:  Social History     Social History     Substance and Sexual Activity   Alcohol Use Never     Social History     Substance and Sexual Activity   Drug Use No     Social History     Tobacco Use   Smoking Status Never Smoker   Smokeless Tobacco Never Used         Family History: Family History   Problem Relation Age of Onset    Diabetes type II Mother         MELLITUS    COPD Father     Cancer Father         MB 2/4/16    LYMPHOMA CANCER         Review of Systems:    As per the HPI  No weight loss weight gain fever chills night sweats chest pain nausea vomiting diarrhea constipation shortness of breath headaches blurry vision double vision sore throat chronic cough etcetera  Vitals:  Vitals:    08/09/22 1518   BP: 122/84   Pulse: 100   Temp: (!) 97 °F (36 1 °C)   SpO2: 98%       Physical Exam:  Patient is a healthy-appearing middle-aged white male 5 ft 8 in 197 lb  He is awake alert no distress  Vital signs as above    Skin warm dry  Head normocephalic and atraumatic  Eyes KRISTI a m  Intact  Ears nose within normal limits  Throat gag reflex intact  Neck there is a scar, transverse, just above the suprasternal notch freed appears to be well healed  There is no evidence of neck mass or lymphadenopathy noted  Back no CVA or spinal tenderness  Lungs clear to a and P  Cor regular rate and rhythm no murmurs carotid bruits  Abdomen soft flat he has some mild right upper quadrant tenderness present no palpable masses or hernias noted  Extremities negative CC E  Neurologically A&O x3 cranial nerves 2-12 intact  Lymphatics no lymphadenopathy palpable  Lab Results: I have personally reviewed pertinent reports  See below  Imaging: I have personally reviewed pertinent imaging studies primarily his CT scan of the abdomen and pelvis  EKG, Pathology, and Other Studies: I have personally reviewed pertinent reports  No visits with results within 1 Day(s) from this visit  Latest known visit with results is:   Office Visit on 07/28/2022   Component Date Value    Hemoglobin A1C 07/28/2022 9 0 (A)         Impression:  Symptomatic gallstones  The patient has a 4 5 cm calcified gallstone in his gallbladder    It would most likely benefit from laparoscopic cholecystectomy  Plan:  Repeat some laboratory values  Scheduled for laparoscopic cholecystectomy at the earliest possible date

## 2022-08-10 NOTE — H&P
Chief Complaint:  Symptomatic gallstones      History of Present Illness:  Patient 48-year-old white male previous patient of ours on whom we performed a total thyroidectomy with lymphadenectomy for carcinoma of the thyroid in April 2017  He has done well from this  Recently developed some right flank pain  He was seen in the emergency room at [address] and a CT scan of the abdomen and pelvis was performed to rule out a kidney stone  No renal or ureteral calculi was identified at that time but he was noted to have a large calcified gallstone  He was discharged but he has persistent recurrent right upper quadrant right flank pain since then  He presents to the office today with his wife Deay Morning Glory to discuss cholecystectomy  He does admit to bloating and occasional nausea and of course intermittent right upper quadrant abdominal pain  Pain right upper quadrant radiates to the right flank  Past Medical History:   Past Medical History:   Diagnosis Date    Cancer [facility])     Thyroid cancer    Diabetes mellitus ([facility] [address] )     Disease of thyroid gland     Hyperlipidemia     Hypertension          Past Surgical History:    Past Surgical History:   Procedure Laterality Date    NECK SURGERY  04/21/2017    THYROIDECTOMY           Allergies:     Allergies   Allergen Reactions    No Active Allergies          Medications:    Current Outpatient Medications:     atorvastatin (LIPITOR) 20 mg tablet, Take 1 tablet (20 mg total) by mouth daily at bedtime, Disp: 90 tablet, Rfl: 1    levothyroxine 200 mcg tablet, Take 200 mcg by mouth daily, Disp: , Rfl: 6    levothyroxine 25 mcg tablet, Take 25 mcg by mouth daily, Disp: , Rfl:     metFORMIN (GLUCOPHAGE-XR) 750 mg 24 hr tablet, Take 1 tablet (750 mg total) by mouth daily with breakfast, Disp: 90 tablet, Rfl: 1    amLODIPine (NORVASC) 5 mg tablet, Take 1 tablet (5 mg total) by mouth daily (Patient not taking: Reported on 8/9/2022), Disp: 90 tablet, Rfl: 1    ascorbic acid (VITAMIN C) 1000 MG tablet, Take 1 tablet (1,000 mg total) by mouth every 12 (twelve) hours for 10 doses, Disp: 10 tablet, Rfl: 0    benazepril (LOTENSIN) 20 mg tablet, Take 0 5 tablets (10 mg total) by mouth daily (Patient not taking: Reported on 8/9/2022), Disp: 90 tablet, Rfl: 1    cholecalciferol (VITAMIN D3) 1,000 units tablet, Take 2 tablets (2,000 Units total) by mouth daily (Patient not taking: No sig reported), Disp: 20 tablet, Rfl: 0    clobetasol (TEMOVATE) 0 05 % cream, APPLY UP TO TWICE DAILY TO SKIN OR ECZEMA (Patient not taking: No sig reported), Disp: , Rfl: 0    glucose blood test strip, test blood sugar twice daily or prn (Patient not taking: No sig reported), Disp: , Rfl:     hydrocortisone 1 % lotion, APPLY AT BEDTIME (Patient not taking: No sig reported), Disp: , Rfl: 3    Insulin Degludec-Liraglutide (Insulin Degludec-Liraglutide) 100 units-3 6 mg/mL injection pen, inject (16UNITS) every morning; may increase by 2u every 3 days until B is below 120 in am (Patient not taking: No sig reported), Disp: , Rfl:     Insulin Pen Needle (PEN NEEDLES) 32G X 4 MM MISC, inject once daily as directed (Patient not taking: No sig reported), Disp: , Rfl:     metFORMIN (GLUCOPHAGE) 1000 MG tablet, Take 1 tablet (1,000 mg total) by mouth 2 (two) times a day with meals (Patient not taking: No sig reported), Disp: 180 tablet, Rfl: 1    multivitamin-minerals (CENTRUM ADULTS) tablet, Take 1 tablet by mouth daily (Patient not taking: No sig reported), Disp: 30 tablet, Rfl: 0    zinc sulfate (ZINCATE) 220 mg capsule, Take 1 capsule (220 mg total) by mouth daily for 5 doses, Disp: 5 capsule, Rfl: 0      Social History:  Social History     Social History     Substance and Sexual Activity   Alcohol Use Never     Social History     Substance and Sexual Activity   Drug Use No     Social History     Tobacco Use   Smoking Status Never Smoker   Smokeless Tobacco Never Used         Family History: Family History   Problem Relation Age of Onset    Diabetes type II Mother         MELLITUS    COPD Father     Cancer Father         MB 2/4/16    LYMPHOMA CANCER         Review of Systems:    As per the HPI  No weight loss weight gain fever chills night sweats chest pain nausea vomiting diarrhea constipation shortness of breath headaches blurry vision double vision sore throat chronic cough etcetera  Vitals:  Vitals:    08/09/22 1518   BP: 122/84   Pulse: 100   Temp: (!) 97 °F (36 1 °C)   SpO2: 98%       Physical Exam:  Patient is a healthy-appearing middle-aged white male 5 ft 8 in 197 lb  He is awake alert no distress  Vital signs as above    Skin warm dry  Head normocephalic and atraumatic  Eyes KRISTI a m  Intact  Ears nose within normal limits  Throat gag reflex intact  Neck there is a scar, transverse, just above the suprasternal notch freed appears to be well healed  There is no evidence of neck mass or lymphadenopathy noted  Back no CVA or spinal tenderness  Lungs clear to a and P  Cor regular rate and rhythm no murmurs carotid bruits  Abdomen soft flat he has some mild right upper quadrant tenderness present no palpable masses or hernias noted  Extremities negative CC E  Neurologically A&O x3 cranial nerves 2-12 intact  Lymphatics no lymphadenopathy palpable  Lab Results: I have personally reviewed pertinent reports  See below  Imaging: I have personally reviewed pertinent imaging studies primarily his CT scan of the abdomen and pelvis  EKG, Pathology, and Other Studies: I have personally reviewed pertinent reports  No visits with results within 1 Day(s) from this visit  Latest known visit with results is:   Office Visit on 07/28/2022   Component Date Value    Hemoglobin A1C 07/28/2022 9 0 (A)         Impression:  Symptomatic gallstones  The patient has a 4 5 cm calcified gallstone in his gallbladder    It would most likely benefit from laparoscopic cholecystectomy  Plan:  Repeat some laboratory values  Scheduled for laparoscopic cholecystectomy at the earliest possible date

## 2022-08-15 ENCOUNTER — OFFICE VISIT (OUTPATIENT)
Dept: LAB | Facility: CLINIC | Age: 47
End: 2022-08-15
Payer: COMMERCIAL

## 2022-08-15 ENCOUNTER — LAB (OUTPATIENT)
Dept: LAB | Facility: CLINIC | Age: 47
End: 2022-08-15
Payer: COMMERCIAL

## 2022-08-15 DIAGNOSIS — Z12.5 PROSTATE CANCER SCREENING: ICD-10-CM

## 2022-08-15 DIAGNOSIS — E89.0 POSTOPERATIVE HYPOTHYROIDISM: ICD-10-CM

## 2022-08-15 DIAGNOSIS — K80.10 CALCULOUS CHOLECYSTITIS: ICD-10-CM

## 2022-08-15 LAB
ALBUMIN SERPL BCP-MCNC: 4.1 G/DL (ref 3.5–5)
ALP SERPL-CCNC: 52 U/L (ref 34–104)
ALT SERPL W P-5'-P-CCNC: 16 U/L (ref 7–52)
ANION GAP SERPL CALCULATED.3IONS-SCNC: 6 MMOL/L (ref 4–13)
AST SERPL W P-5'-P-CCNC: 10 U/L (ref 13–39)
BASOPHILS # BLD AUTO: 0.07 THOUSANDS/ΜL (ref 0–0.1)
BASOPHILS NFR BLD AUTO: 2 % (ref 0–1)
BILIRUB SERPL-MCNC: 0.96 MG/DL (ref 0.2–1)
BUN SERPL-MCNC: 20 MG/DL (ref 5–25)
CALCIUM SERPL-MCNC: 8.6 MG/DL (ref 8.4–10.2)
CHLORIDE SERPL-SCNC: 102 MMOL/L (ref 96–108)
CHOLEST SERPL-MCNC: 126 MG/DL
CO2 SERPL-SCNC: 28 MMOL/L (ref 21–32)
CREAT SERPL-MCNC: 0.84 MG/DL (ref 0.6–1.3)
EOSINOPHIL # BLD AUTO: 0.09 THOUSAND/ΜL (ref 0–0.61)
EOSINOPHIL NFR BLD AUTO: 2 % (ref 0–6)
ERYTHROCYTE [DISTWIDTH] IN BLOOD BY AUTOMATED COUNT: 13.1 % (ref 11.6–15.1)
GFR SERPL CREATININE-BSD FRML MDRD: 103 ML/MIN/1.73SQ M
GLUCOSE P FAST SERPL-MCNC: 240 MG/DL (ref 65–99)
HCT VFR BLD AUTO: 42.4 % (ref 36.5–49.3)
HDLC SERPL-MCNC: 31 MG/DL
HGB BLD-MCNC: 14.9 G/DL (ref 12–17)
IMM GRANULOCYTES # BLD AUTO: 0.04 THOUSAND/UL (ref 0–0.2)
IMM GRANULOCYTES NFR BLD AUTO: 1 % (ref 0–2)
LDLC SERPL CALC-MCNC: 74 MG/DL (ref 0–100)
LYMPHOCYTES # BLD AUTO: 1.27 THOUSANDS/ΜL (ref 0.6–4.47)
LYMPHOCYTES NFR BLD AUTO: 27 % (ref 14–44)
MCH RBC QN AUTO: 30 PG (ref 26.8–34.3)
MCHC RBC AUTO-ENTMCNC: 35.1 G/DL (ref 31.4–37.4)
MCV RBC AUTO: 86 FL (ref 82–98)
MONOCYTES # BLD AUTO: 0.35 THOUSAND/ΜL (ref 0.17–1.22)
MONOCYTES NFR BLD AUTO: 8 % (ref 4–12)
NEUTROPHILS # BLD AUTO: 2.81 THOUSANDS/ΜL (ref 1.85–7.62)
NEUTS SEG NFR BLD AUTO: 60 % (ref 43–75)
NONHDLC SERPL-MCNC: 95 MG/DL
NRBC BLD AUTO-RTO: 0 /100 WBCS
PLATELET # BLD AUTO: 163 THOUSANDS/UL (ref 149–390)
PMV BLD AUTO: 9.5 FL (ref 8.9–12.7)
POTASSIUM SERPL-SCNC: 3.8 MMOL/L (ref 3.5–5.3)
PROT SERPL-MCNC: 6.7 G/DL (ref 6.4–8.4)
PSA SERPL-MCNC: 0.5 NG/ML (ref 0–4)
RBC # BLD AUTO: 4.96 MILLION/UL (ref 3.88–5.62)
SODIUM SERPL-SCNC: 136 MMOL/L (ref 135–147)
TRIGL SERPL-MCNC: 104 MG/DL
TSH SERPL DL<=0.05 MIU/L-ACNC: 25.14 UIU/ML (ref 0.45–4.5)
WBC # BLD AUTO: 4.63 THOUSAND/UL (ref 4.31–10.16)

## 2022-08-15 PROCEDURE — G0103 PSA SCREENING: HCPCS

## 2022-08-15 PROCEDURE — 85025 COMPLETE CBC W/AUTO DIFF WBC: CPT

## 2022-08-15 PROCEDURE — 93005 ELECTROCARDIOGRAM TRACING: CPT

## 2022-08-15 PROCEDURE — 80053 COMPREHEN METABOLIC PANEL: CPT

## 2022-08-15 PROCEDURE — 36415 COLL VENOUS BLD VENIPUNCTURE: CPT

## 2022-08-15 PROCEDURE — 80061 LIPID PANEL: CPT | Performed by: PHYSICIAN ASSISTANT

## 2022-08-15 PROCEDURE — 84443 ASSAY THYROID STIM HORMONE: CPT

## 2022-08-16 LAB
ATRIAL RATE: 78 BPM
P AXIS: 30 DEGREES
PR INTERVAL: 176 MS
QRS AXIS: 56 DEGREES
QRSD INTERVAL: 78 MS
QT INTERVAL: 378 MS
QTC INTERVAL: 430 MS
T WAVE AXIS: 7 DEGREES
VENTRICULAR RATE: 78 BPM

## 2022-08-16 PROCEDURE — 93010 ELECTROCARDIOGRAM REPORT: CPT | Performed by: INTERNAL MEDICINE

## 2022-08-16 NOTE — RESULT ENCOUNTER NOTE
Good morning Whitney Button,   I did notice that you reviewed your lab results  The only concern is your elevated TSH  Please let me know if you are currently taking levothyroxine  I see it on your list at 200 mcg daily  If you are taking this dose daily the medication dose would need to be adjusted  If not I would recommend a lower dose of levothyroxine  Please send a message through [facility] regarding this medication    Thanks and have a great day,   Mariajose

## 2022-08-18 ENCOUNTER — ANESTHESIA EVENT (OUTPATIENT)
Dept: PERIOP | Facility: HOSPITAL | Age: 47
End: 2022-08-18
Payer: COMMERCIAL

## 2022-08-19 ENCOUNTER — VBI (OUTPATIENT)
Dept: ADMINISTRATIVE | Facility: OTHER | Age: 47
End: 2022-08-19

## 2022-08-19 ENCOUNTER — TELEPHONE (OUTPATIENT)
Dept: FAMILY MEDICINE CLINIC | Facility: CLINIC | Age: 47
End: 2022-08-19

## 2022-08-19 NOTE — TELEPHONE ENCOUNTER
Dr Halley Holcomb [phone] called asking if he would be acceptable for upcoming laparoscopic cholecystectomy for next week  I did advise him that his hemoglobin A1c as of July 28th is 9 0  I did advise him that would put him at increased risk for infection  I did advise him that he is noncompliant with treatment for his diabetes and hypertension  He states he will call the patient to let him know that he can be seen here for clearance and I could do a sooner hemoglobin A1c than the recommended 3 months which would be October 28th  I did advise him that the patient needs to be aware that he may get a bill for the hemoglobin A1c  He states he will call the patient and stressed the importance of compliance and follow-up with me

## 2022-08-22 NOTE — PRE-PROCEDURE INSTRUCTIONS
Pre-Surgery Instructions:   Medication Instructions    atorvastatin (LIPITOR) 20 mg tablet Continue to take as prescribed including DOS with a small sip of water, unless usually taken at night    levothyroxine 200 mcg tablet Continue to take as prescribed including DOS with a small sip of water, unless usually taken at night    levothyroxine 25 mcg tablet Continue to take as prescribed including DOS with a small sip of water, unless usually taken at night    metFORMIN (GLUCOPHAGE-XR) 750 mg 24 hr tablet continue as prescribed excluding DOS    **Patient is not currently taking many medications on MAR- He may be re-started on some medications-Patient verbalizes understanding to call Network PAT line if he is started on a new medications (other than what is listed above) to receive updated instructions**  Patient instructed to avoid ASPIRIN, OTC vitamins and NSAIDS prior to surgery  Tylenol okay PRN  Patient instructed to continue scheduled medications excluding DOS  Patient given NPO instructions  Patient given CHG bathing instruction per protocol  Patient instructed to avoid using lotions, powders, oils, etc  DOS  Patient given up to date visitor guidelines  Patient instructed to have a ride home after surgery  Patient instructed to remove jewelry and not to bring valuables DOS  Patient informed that dentures and contact lenses will have to be removed for surgery  Patient understands he or she will receive a call the afternoon before surgery regarding an arrival time  Patient verbalized understanding of all instructions

## 2022-08-23 ENCOUNTER — TELEPHONE (OUTPATIENT)
Dept: FAMILY MEDICINE CLINIC | Facility: CLINIC | Age: 47
End: 2022-08-23

## 2022-08-26 ENCOUNTER — ANESTHESIA (OUTPATIENT)
Dept: PERIOP | Facility: HOSPITAL | Age: 47
End: 2022-08-26
Payer: COMMERCIAL

## 2022-08-29 ENCOUNTER — HOSPITAL ENCOUNTER (OUTPATIENT)
Facility: HOSPITAL | Age: 47
Setting detail: OUTPATIENT SURGERY
Discharge: HOME | End: 2022-08-29
Attending: SPECIALIST | Admitting: SPECIALIST
Payer: COMMERCIAL

## 2022-08-29 VITALS
BODY MASS INDEX: 29.86 KG/M2 | WEIGHT: 197 LBS | RESPIRATION RATE: 18 BRPM | DIASTOLIC BLOOD PRESSURE: 76 MMHG | HEART RATE: 70 BPM | TEMPERATURE: 97.2 F | HEIGHT: 68 IN | SYSTOLIC BLOOD PRESSURE: 119 MMHG | OXYGEN SATURATION: 93 %

## 2022-08-29 DIAGNOSIS — K80.10 CALCULOUS CHOLECYSTITIS: ICD-10-CM

## 2022-08-29 DIAGNOSIS — K80.13 CALCULUS OF GALLBLADDER WITH ACUTE ON CHRONIC CHOLECYSTITIS WITH OBSTRUCTION: Primary | ICD-10-CM

## 2022-08-29 LAB
GLUCOSE SERPL-MCNC: 152 MG/DL (ref 65–140)
GLUCOSE SERPL-MCNC: 171 MG/DL (ref 65–140)

## 2022-08-29 PROCEDURE — 88304 TISSUE EXAM BY PATHOLOGIST: CPT | Performed by: PATHOLOGY

## 2022-08-29 PROCEDURE — 82948 REAGENT STRIP/BLOOD GLUCOSE: CPT

## 2022-08-29 PROCEDURE — 47562 LAPAROSCOPIC CHOLECYSTECTOMY: CPT | Performed by: SPECIALIST

## 2022-08-29 RX ORDER — CEFAZOLIN SODIUM 2 G/50ML
2000 SOLUTION INTRAVENOUS ONCE
Status: COMPLETED | OUTPATIENT
Start: 2022-08-29 | End: 2022-08-29

## 2022-08-29 RX ORDER — SODIUM CHLORIDE 9 MG/ML
INJECTION, SOLUTION INTRAVENOUS AS NEEDED
Status: DISCONTINUED | OUTPATIENT
Start: 2022-08-29 | End: 2022-08-29 | Stop reason: HOSPADM

## 2022-08-29 RX ORDER — DEXAMETHASONE SODIUM PHOSPHATE 10 MG/ML
INJECTION, SOLUTION INTRAMUSCULAR; INTRAVENOUS AS NEEDED
Status: DISCONTINUED | OUTPATIENT
Start: 2022-08-29 | End: 2022-08-29

## 2022-08-29 RX ORDER — PROPOFOL 10 MG/ML
INJECTION, EMULSION INTRAVENOUS AS NEEDED
Status: DISCONTINUED | OUTPATIENT
Start: 2022-08-29 | End: 2022-08-29

## 2022-08-29 RX ORDER — KETOROLAC TROMETHAMINE 30 MG/ML
INJECTION, SOLUTION INTRAMUSCULAR; INTRAVENOUS AS NEEDED
Status: DISCONTINUED | OUTPATIENT
Start: 2022-08-29 | End: 2022-08-29

## 2022-08-29 RX ORDER — ONDANSETRON 2 MG/ML
INJECTION INTRAMUSCULAR; INTRAVENOUS AS NEEDED
Status: DISCONTINUED | OUTPATIENT
Start: 2022-08-29 | End: 2022-08-29

## 2022-08-29 RX ORDER — SODIUM CHLORIDE, SODIUM LACTATE, POTASSIUM CHLORIDE, CALCIUM CHLORIDE 600; 310; 30; 20 MG/100ML; MG/100ML; MG/100ML; MG/100ML
INJECTION, SOLUTION INTRAVENOUS CONTINUOUS PRN
Status: DISCONTINUED | OUTPATIENT
Start: 2022-08-29 | End: 2022-08-29

## 2022-08-29 RX ORDER — OXYCODONE HYDROCHLORIDE AND ACETAMINOPHEN 5; 325 MG/1; MG/1
1 TABLET ORAL EVERY 4 HOURS PRN
Status: DISCONTINUED | OUTPATIENT
Start: 2022-08-29 | End: 2022-08-29 | Stop reason: HOSPADM

## 2022-08-29 RX ORDER — BUPIVACAINE HYDROCHLORIDE 5 MG/ML
INJECTION, SOLUTION PERINEURAL AS NEEDED
Status: DISCONTINUED | OUTPATIENT
Start: 2022-08-29 | End: 2022-08-29 | Stop reason: HOSPADM

## 2022-08-29 RX ORDER — GLYCOPYRROLATE 0.2 MG/ML
INJECTION INTRAMUSCULAR; INTRAVENOUS AS NEEDED
Status: DISCONTINUED | OUTPATIENT
Start: 2022-08-29 | End: 2022-08-29

## 2022-08-29 RX ORDER — ONDANSETRON 2 MG/ML
4 INJECTION INTRAMUSCULAR; INTRAVENOUS EVERY 8 HOURS PRN
Status: DISCONTINUED | OUTPATIENT
Start: 2022-08-29 | End: 2022-08-29 | Stop reason: HOSPADM

## 2022-08-29 RX ORDER — NEOSTIGMINE METHYLSULFATE 1 MG/ML
INJECTION INTRAVENOUS AS NEEDED
Status: DISCONTINUED | OUTPATIENT
Start: 2022-08-29 | End: 2022-08-29

## 2022-08-29 RX ORDER — FENTANYL CITRATE 50 UG/ML
INJECTION, SOLUTION INTRAMUSCULAR; INTRAVENOUS AS NEEDED
Status: DISCONTINUED | OUTPATIENT
Start: 2022-08-29 | End: 2022-08-29

## 2022-08-29 RX ORDER — OXYCODONE HYDROCHLORIDE AND ACETAMINOPHEN 5; 325 MG/1; MG/1
1 TABLET ORAL EVERY 4 HOURS PRN
Qty: 20 TABLET | Refills: 0 | Status: SHIPPED | OUTPATIENT
Start: 2022-08-29 | End: 2022-09-08

## 2022-08-29 RX ORDER — EPHEDRINE SULFATE 50 MG/ML
INJECTION INTRAVENOUS AS NEEDED
Status: DISCONTINUED | OUTPATIENT
Start: 2022-08-29 | End: 2022-08-29

## 2022-08-29 RX ORDER — MIDAZOLAM HYDROCHLORIDE 2 MG/2ML
INJECTION, SOLUTION INTRAMUSCULAR; INTRAVENOUS AS NEEDED
Status: DISCONTINUED | OUTPATIENT
Start: 2022-08-29 | End: 2022-08-29

## 2022-08-29 RX ORDER — HEPARIN SODIUM 5000 [USP'U]/ML
INJECTION, SOLUTION INTRAVENOUS; SUBCUTANEOUS AS NEEDED
Status: DISCONTINUED | OUTPATIENT
Start: 2022-08-29 | End: 2022-08-29

## 2022-08-29 RX ORDER — PROMETHAZINE HYDROCHLORIDE 25 MG/ML
12.5 INJECTION, SOLUTION INTRAMUSCULAR; INTRAVENOUS ONCE AS NEEDED
Status: DISCONTINUED | OUTPATIENT
Start: 2022-08-29 | End: 2022-08-29 | Stop reason: HOSPADM

## 2022-08-29 RX ORDER — ROCURONIUM BROMIDE 10 MG/ML
INJECTION, SOLUTION INTRAVENOUS AS NEEDED
Status: DISCONTINUED | OUTPATIENT
Start: 2022-08-29 | End: 2022-08-29

## 2022-08-29 RX ORDER — OXYCODONE HYDROCHLORIDE AND ACETAMINOPHEN 5; 325 MG/1; MG/1
2 TABLET ORAL EVERY 4 HOURS PRN
Status: DISCONTINUED | OUTPATIENT
Start: 2022-08-29 | End: 2022-08-29 | Stop reason: HOSPADM

## 2022-08-29 RX ORDER — SODIUM CHLORIDE, SODIUM LACTATE, POTASSIUM CHLORIDE, CALCIUM CHLORIDE 600; 310; 30; 20 MG/100ML; MG/100ML; MG/100ML; MG/100ML
100 INJECTION, SOLUTION INTRAVENOUS CONTINUOUS
Status: DISCONTINUED | OUTPATIENT
Start: 2022-08-29 | End: 2022-08-29 | Stop reason: HOSPADM

## 2022-08-29 RX ORDER — HYDROMORPHONE HCL/PF 1 MG/ML
0.5 SYRINGE (ML) INJECTION
Status: DISCONTINUED | OUTPATIENT
Start: 2022-08-29 | End: 2022-08-29 | Stop reason: HOSPADM

## 2022-08-29 RX ORDER — ONDANSETRON 2 MG/ML
4 INJECTION INTRAMUSCULAR; INTRAVENOUS ONCE AS NEEDED
Status: DISCONTINUED | OUTPATIENT
Start: 2022-08-29 | End: 2022-08-29 | Stop reason: HOSPADM

## 2022-08-29 RX ORDER — LIDOCAINE HYDROCHLORIDE 10 MG/ML
INJECTION, SOLUTION EPIDURAL; INFILTRATION; INTRACAUDAL; PERINEURAL AS NEEDED
Status: DISCONTINUED | OUTPATIENT
Start: 2022-08-29 | End: 2022-08-29

## 2022-08-29 RX ADMIN — GLYCOPYRROLATE 0.4 MG: 0.4 INJECTION INTRAMUSCULAR; INTRAVENOUS at 13:53

## 2022-08-29 RX ADMIN — MIDAZOLAM HYDROCHLORIDE 2 MG: 1 INJECTION, SOLUTION INTRAMUSCULAR; INTRAVENOUS at 12:15

## 2022-08-29 RX ADMIN — DEXAMETHASONE SODIUM PHOSPHATE 10 MG: 10 INJECTION, SOLUTION INTRAMUSCULAR; INTRAVENOUS at 12:27

## 2022-08-29 RX ADMIN — ONDANSETRON 4 MG: 2 INJECTION INTRAMUSCULAR; INTRAVENOUS at 12:25

## 2022-08-29 RX ADMIN — PROPOFOL 200 MG: 10 INJECTION, EMULSION INTRAVENOUS at 12:20

## 2022-08-29 RX ADMIN — HEPARIN SODIUM 5000 UNITS: 5000 INJECTION INTRAVENOUS; SUBCUTANEOUS at 12:42

## 2022-08-29 RX ADMIN — FENTANYL CITRATE 100 MCG: 50 INJECTION, SOLUTION INTRAMUSCULAR; INTRAVENOUS at 12:15

## 2022-08-29 RX ADMIN — FENTANYL CITRATE 50 MCG: 50 INJECTION, SOLUTION INTRAMUSCULAR; INTRAVENOUS at 13:21

## 2022-08-29 RX ADMIN — CEFAZOLIN SODIUM 2000 MG: 2 SOLUTION INTRAVENOUS at 12:12

## 2022-08-29 RX ADMIN — OXYCODONE HYDROCHLORIDE AND ACETAMINOPHEN 1 TABLET: 5; 325 TABLET ORAL at 15:17

## 2022-08-29 RX ADMIN — NEOSTIGMINE METHYLSULFATE 3 MG: 1 INJECTION INTRAVENOUS at 13:53

## 2022-08-29 RX ADMIN — ROCURONIUM BROMIDE 50 MG: 10 INJECTION, SOLUTION INTRAVENOUS at 12:20

## 2022-08-29 RX ADMIN — EPHEDRINE SULFATE 5 MG: 50 INJECTION, SOLUTION INTRAVENOUS at 12:30

## 2022-08-29 RX ADMIN — SODIUM CHLORIDE, SODIUM LACTATE, POTASSIUM CHLORIDE, AND CALCIUM CHLORIDE: .6; .31; .03; .02 INJECTION, SOLUTION INTRAVENOUS at 12:01

## 2022-08-29 RX ADMIN — EPHEDRINE SULFATE 5 MG: 50 INJECTION, SOLUTION INTRAVENOUS at 12:35

## 2022-08-29 RX ADMIN — EPHEDRINE SULFATE 10 MG: 50 INJECTION, SOLUTION INTRAVENOUS at 12:46

## 2022-08-29 RX ADMIN — PROPOFOL 100 MG: 10 INJECTION, EMULSION INTRAVENOUS at 13:21

## 2022-08-29 RX ADMIN — KETOROLAC TROMETHAMINE 30 MG: 30 INJECTION, SOLUTION INTRAMUSCULAR; INTRAVENOUS at 12:26

## 2022-08-29 RX ADMIN — LIDOCAINE HYDROCHLORIDE 50 MG: 10 INJECTION, SOLUTION EPIDURAL; INFILTRATION; INTRACAUDAL; PERINEURAL at 12:20

## 2022-08-29 NOTE — OP NOTE
OPERATIVE REPORT  PATIENT NAME: Shabana Kang    :  1975  MRN: [id number]  Pt Location:  OR ROOM 11    SURGERY DATE: 2022    Surgeon(s) and Role:     * Brooke Stacy MD - Primary    Preop Diagnosis:  Calculous cholecystitis [K80 10]    Post-Op Diagnosis Codes:     * Calculous cholecystitis [K80 10] acute and chronic calculous cholecystitis with obstruction    Procedure(s) (LRB):  LAPAROSCOPIC CHOLECYSTECTOMY (N/A)    Specimen(s):  ID Type Source Tests Collected by Time Destination   1 : GALLBLADDER Tissue Gallbladder TISSUE EXAM Brooke Stacy MD 2022 12:59 PM        Estimated Blood Loss:   Minimal    Drains:  * No LDAs found *    Anesthesia Type:   General    Operative Indications:  Calculous cholecystitis [K80 10]  Acute and chronic calculous cholecystitis with obstruction    Operative Findings:  A significant amount of fibrosis present chronic calculous cholecystitis with acute calculous cholecystitis superimposed  Complications:   None    Procedure and Technique:  Patient brought the operating room postop table supine position  Under adequate general endotracheal anesthesia the abdomen was shaved prepped and draped in usual sterile fashion  An 11 scalp blade was used make incision in the umbilicus  The Veress needle was inserted the abdomen is insufflated with CO2 to 15 mmHg  Needle was removed 10 mm port is inserted  10 mm scope was inserted the abdomen was visually explored  There was noted be no trauma from needle port placement  Additional ports were placed in subxiphoid right subcostal right upper quadrant area  These were 5 mm ports placed under direct vision  The patient is placed in reverse Trendelenburg and rotated to the left  The omentum was retracted off of the liver in the gallbladder was identified  It was acutely distended and edematous consistent with acute cholecystitis with obstruction    The 5 fundus was carefully grasped so as not to ruptured it was retracted to the right subphrenic area  Retracting the gallbladder in this position demonstrated a significant amount of omental adhesions adherent to the gallbladder  These were taken down bluntly  The gallbladder was shaped like an hourglass with a least 1 large stone and  As the gallbladder was retracted this subphrenic area the adhesions were taken down from the anterior portion the gallbladder  The infundibulum was identified finally and was grasped retracted superior laterally  The distal end of the gallbladder was dissected but a significant amount of fibrosis and edema were present  The artery could be seen pulsating and was dissected from the surrounding tissue circumferentially  The duct appeared to be present medial to this and after continued dissection it did indeed identify as the cystic duct  Prior to these last maneuvers the duodenum was adherent to this area and was carefully freed up and mobilized to the proper anatomic position  At this point the cystic duct was circumferentially dissected using the [facility]  Cystic duct common duct junction was identified and was far from the operative field  The duct was dissected circumferentially  It was doubly clipped proximally singly clipped distally and divided  The artery was dealt with in a likewise fashion  Small amount of bleeding was noted medial to the artery and this was controlled electrocautery  The gallbladder was then taken down from liver bed using the hook cautery  Some pulsatile bleeding was encountered from an aberrant vessel and this was controlled with clips and then cautery  Once again it was hourglass shaped and quite fibrotic with a huge stone it appeared it was eventually mobilized and dissected off the liver  It was brought out through the umbilical port site after expanding in at least 3 separate times  The stone in the gallbladder measured 8 cm in length    The gallbladder and the stone was sent to pathology for histological diagnosis  The operative site was inspected for bleeding  The liver bed was irrigated with saline solution there was no bleeding present  The clips were inspected noted be intact and functional   There was no additional bleeding  Any residual fluid was suction from the field  At that point all CO2 and ports removed  Fascial defect at the umbilicus was closed with a running 0 Vicryl suture  All ports were infiltrated with 0 5% Marcaine  Skin closed with 4 Monocryl in at the umbilicus with a running subcuticular  The other 3 ports were closed with a subcuticular stitch  Benzoin Steri-Strips applied  Estimated blood loss minimal patient tolerated procedure well delivered to recovery room stable condition  Thanks     I was present for the entire procedure    Patient Disposition:  PACU       SIGNATURE: Erasmo Marin MD  DATE: August 29, 2022  TIME: 2:16 PM

## 2022-08-29 NOTE — ANESTHESIA PREPROCEDURE EVALUATION
Procedure:  LAPAROSCOPIC CHOLECYSTECTOMY (N/A Abdomen)    Relevant Problems   CARDIO   (+) Essential hypertension   (+) Mixed hyperlipidemia      ENDO   (+) Insulin dependent diabetes mellitus (pt on metformin only  had been compliant with his meds over last 1 month  symptomatic GB stone so will proceed with surgery )   (+) Postoperative hypothyroidism      MUSCULOSKELETAL   (+) Patellofemoral arthritis of right knee      NEURO/PSYCH   (+) Anxiety      PULMONARY   (+) Pneumonia due to COVID-19 virus      Digestive   (+) Calculus of gallbladder without cholecystitis without obstruction      Endocrine   (+) Malignant neoplasm of thyroid gland (HCC)   (+) Uncontrolled type 2 diabetes mellitus with hyperglycemia (HCC)        Physical Exam    Airway    Mallampati score: II  TM Distance: >3 FB  Neck ROM: full     Dental   lower dentures,     Cardiovascular  Cardiovascular exam normal    Pulmonary  Pulmonary exam normal     Other Findings        Anesthesia Plan  ASA Score- 3     Anesthesia Type- general with ASA Monitors  Additional Monitors:   Airway Plan: ETT  Plan Factors-Exercise tolerance (METS): >4 METS  Chart reviewed  EKG reviewed  Existing labs reviewed  Patient summary reviewed  Patient is not a current smoker  Patient not instructed to abstain from smoking on day of procedure  Patient did not smoke on day of surgery  Induction- intravenous  Postoperative Plan- Plan for postoperative opioid use  Informed Consent- Anesthetic plan and risks discussed with patient  I personally reviewed this patient with the CRNA  Discussed and agreed on the Anesthesia Plan with the CRNA               Lab Results   Component Value Date    HGBA1C 9 0 (A) 07/28/2022       Lab Results   Component Value Date    K 3 8 08/15/2022     08/15/2022    CO2 28 08/15/2022    BUN 20 08/15/2022    CREATININE 0 84 08/15/2022    GLUF 240 (H) 08/15/2022    CALCIUM 8 6 08/15/2022    CORRECTEDCA 8 8 04/27/2021 AST 10 (L) 08/15/2022    ALT 16 08/15/2022    ALKPHOS 52 08/15/2022    EGFR 103 08/15/2022       Lab Results   Component Value Date    WBC 4 63 08/15/2022    HGB 14 9 08/15/2022    HCT 42 4 08/15/2022    MCV 86 08/15/2022     08/15/2022   Normal sinus rhythm  When compared with ECG of 25-JUL-2022 07:46,  No significant change was found  Confirmed by Erik Camacho ([id number]) on 8/16/2022 7:16:43 PM

## 2022-08-29 NOTE — ANESTHESIA POSTPROCEDURE EVALUATION
Post-Op Assessment Note    CV Status:  Stable  Pain Score: 0    Pain management: adequate     Mental Status:  Alert   Hydration Status:  Stable   PONV Controlled:  Controlled   Airway Patency:  Patent      Post Op Vitals Reviewed: Yes      Staff: CRNA         No complications documented      BP   108/57   Temp  98 3   Pulse  60   Resp   20   SpO2   98

## 2022-08-29 NOTE — INTERVAL H&P NOTE
H&P reviewed  After examining the patient I find no changes in the patients condition since the H&P had been written      Vitals:    08/29/22 1122   BP: 144/85   Pulse: 66   Resp: 18   Temp: (!) 97 °F (36 1 °C)   SpO2: 96%

## 2022-09-01 ENCOUNTER — HOSPITAL ENCOUNTER (OUTPATIENT)
Dept: MRI IMAGING | Facility: HOSPITAL | Age: 47
Discharge: HOME | End: 2022-09-01
Payer: COMMERCIAL

## 2022-09-01 DIAGNOSIS — K76.9 LIVER LESION: ICD-10-CM

## 2022-09-01 PROCEDURE — A9585 GADOBUTROL INJECTION: HCPCS | Performed by: PHYSICIAN ASSISTANT

## 2022-09-01 PROCEDURE — 74183 MRI ABD W/O CNTR FLWD CNTR: CPT

## 2022-09-01 RX ADMIN — GADOBUTROL 8 ML: 604.72 INJECTION INTRAVENOUS at 18:59

## 2022-09-03 LAB
LEFT EYE DIABETIC RETINOPATHY: NORMAL
RIGHT EYE DIABETIC RETINOPATHY: NORMAL

## 2022-09-03 PROCEDURE — 2022F DILAT RTA XM EVC RTNOPTHY: CPT | Performed by: ORTHOPAEDIC SURGERY

## 2022-09-06 PROCEDURE — 88304 TISSUE EXAM BY PATHOLOGIST: CPT | Performed by: PATHOLOGY

## 2022-09-08 ENCOUNTER — RA CDI HCC (OUTPATIENT)
Dept: OTHER | Facility: HOSPITAL | Age: 47
End: 2022-09-08

## 2022-09-08 NOTE — PROGRESS NOTES
[facility]       Chart reviewed, no opportunity found: CHART REVIEWED, NO OPPORTUNITY FOUND        Patients Insurance        Commercial Insurance: [address] [place] [address]

## 2022-09-09 ENCOUNTER — OFFICE VISIT (OUTPATIENT)
Dept: FAMILY MEDICINE CLINIC | Facility: CLINIC | Age: 47
End: 2022-09-09
Payer: COMMERCIAL

## 2022-09-09 VITALS
DIASTOLIC BLOOD PRESSURE: 78 MMHG | HEART RATE: 105 BPM | HEIGHT: 68 IN | RESPIRATION RATE: 16 BRPM | WEIGHT: 201.2 LBS | SYSTOLIC BLOOD PRESSURE: 110 MMHG | OXYGEN SATURATION: 97 % | BODY MASS INDEX: 30.49 KG/M2 | TEMPERATURE: 97.9 F

## 2022-09-09 DIAGNOSIS — E11.65 UNCONTROLLED TYPE 2 DIABETES MELLITUS WITH HYPERGLYCEMIA (HCC): Primary | ICD-10-CM

## 2022-09-09 DIAGNOSIS — C73 MALIGNANT NEOPLASM OF THYROID GLAND (HCC): ICD-10-CM

## 2022-09-09 DIAGNOSIS — K76.9 LIVER LESION: ICD-10-CM

## 2022-09-09 DIAGNOSIS — K80.20 CALCULUS OF GALLBLADDER WITHOUT CHOLECYSTITIS WITHOUT OBSTRUCTION: ICD-10-CM

## 2022-09-09 DIAGNOSIS — E78.2 MIXED HYPERLIPIDEMIA: ICD-10-CM

## 2022-09-09 DIAGNOSIS — I10 ESSENTIAL HYPERTENSION: ICD-10-CM

## 2022-09-09 DIAGNOSIS — M89.9 LESION OF PELVIC BONE: ICD-10-CM

## 2022-09-09 DIAGNOSIS — E89.0 POSTOPERATIVE HYPOTHYROIDISM: ICD-10-CM

## 2022-09-09 PROCEDURE — 99215 OFFICE O/P EST HI 40 MIN: CPT | Performed by: FAMILY MEDICINE

## 2022-09-09 PROCEDURE — 3074F SYST BP LT 130 MM HG: CPT | Performed by: FAMILY MEDICINE

## 2022-09-09 PROCEDURE — 3078F DIAST BP <80 MM HG: CPT | Performed by: FAMILY MEDICINE

## 2022-09-09 NOTE — PROGRESS NOTES
Assessment/Plan:  Uncontrolled type 2 diabetes mellitus with hyperglycemia (HCC)    Lab Results   Component Value Date    HGBA1C 9 0 (A) 07/28/2022   Not well controlled  He has been taking his metformin on a daily basis  He is not taking his insulin  He stated he has been watching his diet better  He was given prescription for Farxiga 10 mg daily  Discussed about possible side effects  Repeat blood work in 1 month before next appointment  Calculus of gallbladder without cholecystitis without obstruction  Status post cholecystectomy  Doing well  Continue to monitor  Postoperative hypothyroidism  Has been stable  Continue to follow up with Endo  Malignant neoplasm of thyroid gland (HCC)  Stable  Continue follow-up with Synthroid replacement  Essential hypertension  Well controlled without medications  Will continue to monitor  Mixed hyperlipidemia  Continue on atorvastatin  I will repeat blood work before next appointment  Lesion of pelvic bone  Repeat pelvic CT in January  Liver lesion  MRI showed Hemangiomas  Continue to monitor  Diagnoses and all orders for this visit:    Uncontrolled type 2 diabetes mellitus with hyperglycemia ([facility] [address] )  -     Ambulatory referral to Diabetic Education; Future  -     CBC and differential; Future  -     Comprehensive metabolic panel; Future  -     Lipid Panel with Direct LDL reflex; Future  -     TSH, 3rd generation with Free T4 reflex;  Future  -     Microalbumin / creatinine urine ratio  -     Hemoglobin A1C; Future  -     Dapagliflozin Propanediol (Farxiga) 10 MG TABS; Take 1 tablet (10 mg total) by mouth daily    Calculus of gallbladder without cholecystitis without obstruction    Postoperative hypothyroidism    Malignant neoplasm of thyroid gland (HCC)    Essential hypertension    Mixed hyperlipidemia    Liver lesion    Lesion of pelvic bone    BMI 30 0-30 9,adult        Patient Instructions     Type 2 Diabetes Management for Adults AMBULATORY CARE:   Type 2 diabetes  is a disease that affects how your body uses glucose (sugar)  Either your body cannot make enough insulin, or it cannot use the insulin correctly  It is important to keep diabetes controlled to prevent damage to your heart, blood vessels, and other organs  Have someone call your local emergency number (911 in the [address] [facility]) if:   You cannot be woken  You have signs of diabetic ketoacidosis:     confusion, fatigue    vomiting    rapid heartbeat    fruity smelling breath    extreme thirst    dry mouth and skin    You have any of the following signs of a heart attack:      Squeezing, pressure, or pain in your chest    You may  also have any of the following:     Discomfort or pain in your back, neck, jaw, stomach, or arm    Shortness of breath    Nausea or vomiting    Lightheadedness or a sudden cold sweat    You have any of the following signs of a stroke:      Numbness or drooping on one side of your face     Weakness in an arm or leg    Confusion or difficulty speaking    Dizziness, a severe headache, or vision loss    Call your doctor or diabetes care team if:   You have a sore or wound that will not heal     You have a change in the amount you urinate  Your blood sugar levels are higher than your target goals  You often have lower blood sugar levels than your target goals  Your skin is red, dry, warm, or swollen  You have trouble coping with diabetes, or you feel anxious or depressed  You have questions or concerns about your condition or care  What you need to know about high blood sugar levels:  High blood sugar levels may not cause any symptoms  You may feel more thirsty or urinate more often than usual  Over time, high blood sugar levels can damage your nerves, blood vessels, tissues, and organs   The following can increase your blood sugar levels:  Large meals or large amounts of carbohydrates at one time    Less physical activity    Stress    Illness    A lower dose of medicine or insulin, or a late dose    What you need to know about low blood sugar levels: You can prevent symptoms such as shakiness, dizziness, irritability, or confusion by preventing your blood sugar levels from going too low  Treat a low blood sugar level right away  Drink 4 ounces of juice or have 1 tube of glucose gel  Check your blood sugar level again 10 to 15 minutes later  When the level goes back to normal, eat a meal or snack to prevent another decrease  Keep glucose gel, raisins, or hard candy with you at all times to treat a low blood sugar level  Your blood sugar level can get too low if you take diabetes medicine or insulin and do not eat enough food  If you use insulin, check your blood sugar level before you exercise  If your blood sugar level is below 100 mg/dL, eat 4 crackers or 2 ounces of raisins, or drink 4 ounces of juice  Check your level every 30 minutes if you exercise more than 1 hour  You may need a snack during or after exercise  What you can do to manage your blood sugar levels:   Check your blood sugar levels as directed and as needed  Several items are available to use to check your levels  You may need to check by testing a drop of blood in a glucose monitor  You may instead be given a continuous glucose monitoring (CGM) device  The device is worn at all times  The CGM checks your blood sugar level every 5 minutes  It sends results to an electronic device such as a smart phone  A CGM can be used with or without an insulin pump  Talk with your provider to find out which method is best for you  The goal for blood sugar levels before meals  is between 80 and 130 mg/dL and 2 hours after eating  is lower than 180 mg/dL  Make healthy food choices  Work with a dietitian to develop a meal plan that works for you and your schedule   A dietitian can help you learn how to eat the right amount of carbohydrates during your meals and snacks  Carbohydrates can raise your blood sugar level if you eat too many at one time  Examples of foods that contain carbohydrates are breads, cereals, rice, pasta, and sweets  Get regular physical activity  Physical activity can help you get to your target blood sugar level goal and manage your weight  Get at least 150 minutes of moderate to vigorous aerobic physical activity each week  Do not miss more than 2 days in a row  Do not sit longer than 30 minutes at a time  Your healthcare provider can help you create an activity plan  The plan can include the best activities for you and can help you build your strength and endurance  Maintain a healthy weight  Ask your healthcare provider what a healthy weight is for you  Ask him or her to help you create a safe weight loss plan if you are overweight  Take your diabetes medicine or insulin as directed  You may need diabetes medicine, insulin, or both to help control your blood sugar levels  Your healthcare provider will teach you how and when to take your diabetes medicine or insulin  You will also be taught about side effects oral diabetes medicine can cause  Insulin may be injected, or given through a pump or pen  You and your care team will discuss which method is best for you  An insulin pump  is an implanted device that gives your insulin 24 hours a day  An insulin pump prevents the need for multiple insulin injections in a day  An insulin pen  is a device prefilled with the right amount of insulin  You and your family members will be taught how to draw up and give insulin  if this is the best method for you  Your education team will also teach you how to dispose of needles and syringes  You will learn how much insulin you need  and when to give it  You will be taught when not to give insulin  You will also be taught what to do if your blood sugar level drops too low   This may happen if you take insulin and do not eat the right amount of carbohydrates  Other things you can do to manage type 2 diabetes:   Wear medical alert identification  Wear medical alert jewelry or carry a card that says you have diabetes  Ask your provider where to get these items  Do not smoke  Nicotine and other chemicals in cigarettes and cigars can cause lung and blood vessel damage  It also makes it more difficult to manage your diabetes  Ask your provider for information if you currently smoke and need help to quit  Do not use e-cigarettes or smokeless tobacco in place of cigarettes or to help you quit  They still contain nicotine  Check your feet each day for cuts, scratches, calluses, or other wounds  Look for redness and swelling, and feel for warmth  Wear shoes that fit well  Check your shoes for rocks or other objects that can hurt your feet  Do not walk barefoot or wear shoes without socks  Wear cotton socks to help keep your feet dry  Ask about vaccines you may need  You have a higher risk for serious illness if you get the flu, pneumonia, COVID-19, or hepatitis  Ask your provider if you should get vaccines to prevent these or other diseases, and when to get the vaccines  Talk to your care team if you become stressed about diabetes care  Sometimes being able to fit diabetes care into your life can cause increased stress  The stress can cause you not to take care of yourself properly  Your care team can help by offering tips about self-care  Your care team may suggest you talk to a mental health provider  The provider can listen and offer help with self-care issues  Follow up with your doctor or diabetes care team as directed: You may need to have blood tests done before your follow-up visit  The test results will show if changes need to be made in your treatment or self-care  Write down your questions so you remember to ask them during your visits   Talk to your provider if you cannot afford your medicine  © Copyright [employer] 2022 Information is for End User's use only and may not be sold, redistributed or otherwise used for commercial purposes  All illustrations and images included in CareNotes® are the copyrighted property of [employer]  or [employer]  The above information is an  only  It is not intended as medical advice for individual conditions or treatments  Talk to your doctor, nurse or pharmacist before following any medical regimen to see if it is safe and effective for you  Return in about 1 month (around 10/9/2022)  Subjective:      Patient ID: Len Griggs is a 49 y o  male  Chief Complaint   Patient presents with    Diabetes    Results       He is here today for follow-up multiple medical problems  He is here to reestablish  He has history of diabetes mellitus type 2 not well controlled  He is not compliant with taking his medications but recently he has been taking his medication he stated since the end of July he has been taking his metformin 750 mg daily every day  He also take his cholesterol medicine at nighttime and his levothyroxine  He has history of thyroid cancer status post total thyroidectomy  He also has recent history of cholecystitis status post cholecystectomy  He had abdominal MRI showed liver hemangiomas  He had abdominal and pelvic CT showed sacral sclerotic lesion recommended repeated 6 months for further follow-up  The following portions of the patient's history were reviewed and updated as appropriate: allergies, current medications, past family history, past medical history, past social history, past surgical history and problem list     Review of Systems   Constitutional: Negative for chills and fever  HENT: Negative for trouble swallowing  Eyes: Negative for visual disturbance  Respiratory: Negative for cough and shortness of breath      Cardiovascular: Negative for chest pain and palpitations  Gastrointestinal: Negative for abdominal pain, blood in stool and vomiting  Endocrine: Negative for cold intolerance and heat intolerance  Genitourinary: Negative for difficulty urinating and dysuria  Musculoskeletal: Negative for gait problem  Skin: Negative for rash  Neurological: Negative for dizziness, syncope and headaches  Hematological: Negative for adenopathy  Psychiatric/Behavioral: Negative for behavioral problems           Current Outpatient Medications   Medication Sig Dispense Refill    atorvastatin (LIPITOR) 20 mg tablet Take 1 tablet (20 mg total) by mouth daily at bedtime 90 tablet 1    Dapagliflozin Propanediol (Farxiga) 10 MG TABS Take 1 tablet (10 mg total) by mouth daily 30 tablet 3    levothyroxine 200 mcg tablet Take 200 mcg by mouth daily  6    levothyroxine 25 mcg tablet Take 25 mcg by mouth daily      metFORMIN (GLUCOPHAGE-XR) 750 mg 24 hr tablet Take 1 tablet (750 mg total) by mouth daily with breakfast 90 tablet 1    ascorbic acid (VITAMIN C) 1000 MG tablet Take 1 tablet (1,000 mg total) by mouth every 12 (twelve) hours for 10 doses 10 tablet 0    benazepril (LOTENSIN) 20 mg tablet Take 0 5 tablets (10 mg total) by mouth daily (Patient not taking: Reported on 8/9/2022) 90 tablet 1    cholecalciferol (VITAMIN D3) 1,000 units tablet Take 2 tablets (2,000 Units total) by mouth daily (Patient not taking: No sig reported) 20 tablet 0    clobetasol (TEMOVATE) 0 05 % cream APPLY UP TO TWICE DAILY TO SKIN OR ECZEMA (Patient not taking: No sig reported)  0    glucose blood test strip test blood sugar twice daily or prn (Patient not taking: No sig reported)      hydrocortisone 1 % lotion APPLY AT BEDTIME (Patient not taking: No sig reported)  3    Insulin Degludec-Liraglutide (Insulin Degludec-Liraglutide) 100 units-3 6 mg/mL injection pen inject (16UNITS) every morning; may increase by 2u every 3 days until B is below 120 in am (Patient not taking: No sig reported)      Insulin Pen Needle (PEN NEEDLES) 32G X 4 MM MISC inject once daily as directed (Patient not taking: No sig reported)      multivitamin-minerals (CENTRUM ADULTS) tablet Take 1 tablet by mouth daily (Patient not taking: No sig reported) 30 tablet 0    zinc sulfate (ZINCATE) 220 mg capsule Take 1 capsule (220 mg total) by mouth daily for 5 doses 5 capsule 0     No current facility-administered medications for this visit  Objective:    /78 (BP Location: Left arm, Patient Position: Sitting, Cuff Size: Large)   Pulse 105   Temp 97 9 °F (36 6 °C) (Tympanic)   Resp 16   Ht 5' 8" (1 727 m)   Wt 91 3 kg (201 lb 3 2 oz)   SpO2 97%   BMI 30 59 kg/m²        Physical Exam  Vitals and nursing note reviewed  Constitutional:       Appearance: He is well-developed  HENT:      Head: Normocephalic and atraumatic  Eyes:      Pupils: Pupils are equal, round, and reactive to light  Cardiovascular:      Rate and Rhythm: Normal rate and regular rhythm  Pulses: no weak pulses          Dorsalis pedis pulses are 2+ on the right side and 2+ on the left side  Heart sounds: Normal heart sounds  Pulmonary:      Effort: Pulmonary effort is normal       Breath sounds: Normal breath sounds  Abdominal:      General: Bowel sounds are normal       Palpations: Abdomen is soft  Musculoskeletal:         General: Normal range of motion  Cervical back: Normal range of motion and neck supple  Feet:      Right foot:      Skin integrity: No ulcer, skin breakdown, erythema, warmth, callus or dry skin  Left foot:      Skin integrity: No ulcer, skin breakdown, erythema, warmth, callus or dry skin  Lymphadenopathy:      Cervical: No cervical adenopathy  Skin:     General: Skin is warm  Findings: No rash  Neurological:      Mental Status: He is alert and oriented to person, place, and time  Cranial Nerves: No cranial nerve deficit                  Tashia Minaya MD    Diabetic Foot Exam    Patient's shoes and socks removed  Right Foot/Ankle   Right Foot Inspection  Skin Exam: skin normal and skin intact  No dry skin, no warmth, no callus, no erythema, no maceration, no abnormal color, no pre-ulcer, no ulcer and no callus  Toe Exam: ROM and strength within normal limits  Sensory   Vibration: intact  Monofilament testing: intact    Vascular  Capillary refills: < 3 seconds  The right DP pulse is 2+  Left Foot/Ankle  Left Foot Inspection  Skin Exam: skin normal and skin intact  No dry skin, no warmth, no erythema, no maceration, normal color, no pre-ulcer, no ulcer and no callus  Toe Exam: ROM and strength within normal limits  Sensory   Vibration: intact  Monofilament testing: intact    Vascular  Capillary refills: < 3 seconds  The left DP pulse is 2+  Assign Risk Category  No deformity present  No loss of protective sensation  No weak pulses  Risk: 0  BMI Counseling: Body mass index is 30 59 kg/m²  The BMI is above normal  Nutrition recommendations include reducing portion sizes, decreasing overall calorie intake and 3-5 servings of fruits/vegetables daily  Exercise recommendations include moderate aerobic physical activity for 150 minutes/week

## 2022-09-09 NOTE — ASSESSMENT & PLAN NOTE
Lab Results   Component Value Date    HGBA1C 9 0 (A) 07/28/2022   Not well controlled  He has been taking his metformin on a daily basis  He is not taking his insulin  He stated he has been watching his diet better  He was given prescription for Farxiga 10 mg daily  Discussed about possible side effects  Repeat blood work in 1 month before next appointment

## 2022-09-09 NOTE — PATIENT INSTRUCTIONS

## 2022-09-13 ENCOUNTER — OFFICE VISIT (OUTPATIENT)
Dept: SURGERY | Facility: CLINIC | Age: 47
End: 2022-09-13

## 2022-09-13 VITALS
BODY MASS INDEX: 30.46 KG/M2 | TEMPERATURE: 97.5 F | HEART RATE: 103 BPM | WEIGHT: 201 LBS | HEIGHT: 68 IN | OXYGEN SATURATION: 97 %

## 2022-09-13 DIAGNOSIS — K80.10 CALCULOUS CHOLECYSTITIS: Primary | ICD-10-CM

## 2022-09-13 PROCEDURE — 99024 POSTOP FOLLOW-UP VISIT: CPT | Performed by: SPECIALIST

## 2022-09-13 NOTE — PROGRESS NOTES
Cindy Cortez presents today for his follow-up visit status post laparoscopic cholecystectomy for symptomatic gallstones       Today in the office says he feels great  Preop pain is totally gone  He is tolerating his diet  He said his bowels were little sluggish in the beginning but neither fine  He denies any diarrhea  He is also taking his diabetic medication which is great  Physical exam:  Middle-aged white male awake alert no distress    Abdomen laparoscopic incisions are healing well with no evidence of infection or hernia  He has excellent cosmetic result  Impression:  Doing well status post laparoscopic cholecystectomy for symptomatic gallstones  Plan: At this point will discharge Cindy Cortez  Once again he is encouraged to take his diabetic medication  This is important  I think he realizes that now  I hope  It was great see him again  Call with any problems

## 2022-09-15 ENCOUNTER — OFFICE VISIT (OUTPATIENT)
Dept: OBGYN CLINIC | Facility: CLINIC | Age: 47
End: 2022-09-15
Payer: COMMERCIAL

## 2022-09-15 VITALS
HEART RATE: 92 BPM | BODY MASS INDEX: 30.46 KG/M2 | HEIGHT: 68 IN | DIASTOLIC BLOOD PRESSURE: 75 MMHG | WEIGHT: 201 LBS | SYSTOLIC BLOOD PRESSURE: 110 MMHG

## 2022-09-15 DIAGNOSIS — M17.11 PATELLOFEMORAL ARTHRITIS OF RIGHT KNEE: Primary | ICD-10-CM

## 2022-09-15 PROCEDURE — 99212 OFFICE O/P EST SF 10 MIN: CPT | Performed by: ORTHOPAEDIC SURGERY

## 2022-09-15 NOTE — PROGRESS NOTES
Assessment/Plan:  1  Patellofemoral arthritis of right knee  Injection Procedure Prior Authorization     Scribe Attestation    I,:  Brooke Bauer MA am acting as a scribe while in the presence of the attending physician :       I,:  Tracy Wilkinson DO personally performed the services described in this documentation    as scribed in my presence  :             18-year-old male with right knee patellofemoral osteoarthritis  He underwent a steroid injection at his last visit which provided him with mild pain relief  We did discuss the importance of [employer] tapping  Treatment options were discussed in the form of visco supplementation  The patient was agreeable to this and a order was placed for this today  He will follow up in the office once this is approved  Subjective:   Shakira Michele is a 49 y o  male who presents to the office today for follow up evaluation right knee patellofemoral osteoarthritis  He underwent a steroid injection at his last visit on 8/4/22 which he states provided him with appx 70-75% pain relief  He notes intermittent pain to the anterior aspect of his knee  He notes increased pain with squatting and abigail  He has not tried [employer] tapping yet as he states he has had a lot of things going on and recently had his gallbladder removed  He has been taking Tylenol OTC for pain  He states his pain is a 4 out of 10 at its best and a 10 out 10 at its worst        Review of Systems   Constitutional: Negative for chills and fever  HENT: Negative for drooling and sneezing  Eyes: Negative for redness  Respiratory: Negative for cough and wheezing  Gastrointestinal: Negative for nausea and vomiting  Musculoskeletal: Negative for arthralgias, joint swelling and myalgias  Neurological: Negative for weakness and numbness  Psychiatric/Behavioral: Negative for behavioral problems  The patient is not nervous/anxious            Past Medical History:   Diagnosis Date    Cancer [facility]  Kidney Transplant  Progress Note      Reason for Follow-up: Reassessment of Kidney Transplant - 4/8/2019  (#1) recipient and management of immunosuppression.    ORGAN: LEFT KIDNEY    Donor Type: Living    Donor CMV Status:   Donor HBcAB:  Donor HCV Status:  Donor HBV OFE:   Donor HCV OFE:       Subjective:   History of Present Illness:  Paige Summers is a 67 y/o female w ESRD 2/2 Hypertensive Nephrosclerosis.  S/p living unrelated kidney transplant 4/8/19, CMV +/+, WIT 30 min, CIT 3h 9 m, HD Tues- Thurs- Sat.  Pt has LUE AVF- last used 4/6/19.  Pt was on Plavix for reoccurring AVF thrombus. Per pt, AVF has been declotted 9 times.  Dry weight 83 kg.  Native UOP <100 ml.  5 day gallardo.   Patients immunosuppression will include Campath for induction with early steroid withdrawal and Prograf and Cellcept for maintenance immunosuppression.  Opportunistic infection prophylaxis will include Valcyte for 3 months (CMV D + , R + ), Bactrim for 1 year, and nystatin for 4 weeks.     Surgery significant for intra op US w slightly decreased perfusion.   Kidney graft injected w verapamil in the artery, repositioned the kidney and clamped the distal iliac artery. The kidney remianed partially pink. Kidney removed, resected previous arteriotomy and venotomy and obtained arterial venous continuity. A small amount of iliac artery was resected and an end to end anastomosis was performed.  Donor kidney was reviewed on the back table, artery was cut back, and elimnated the pair of pants anastomosis. Then the main artery was cut back as there appeared to be an intimal abnormality. Venotomy was made, the vein irrigated, and an end renal to right external iliac vein anastomosis was created.  Arterial control was obtained with a vascular clamp.  Arteriotomy was made, the artery irrigated, and an reconstructed to right external iliac artery anastomosis was created.  The second artery was anastomosed end to side  Thyroid cancer    Diabetes mellitus ([facility] [address] )     Disease of thyroid gland     Hyperlipidemia     Hypertension     Postoperative urinary retention     Seizures (HCC)     x1 at age of 5       Past Surgical History:   Procedure Laterality Date    NECK SURGERY  04/21/2017    "fatty tumor removal"    [place] LAP,CHOLECYSTECTOMY N/A 8/29/2022    Procedure: LAPAROSCOPIC CHOLECYSTECTOMY;  Surgeon: Lakisha Wilson MD;  Location: [address] [place] [address] OR;  Service: General    THYROIDECTOMY      WISDOM TOOTH EXTRACTION         Family History   Problem Relation Age of Onset    Diabetes type II Mother         MELLITUS    COPD Father     Cancer Father         MB 2/4/16    LYMPHOMA CANCER       Social History     Occupational History    Not on file   Tobacco Use    Smoking status: Never Smoker    Smokeless tobacco: Never Used   Vaping Use    Vaping Use: Never used   Substance and Sexual Activity    Alcohol use: Yes     Comment: less than 1 drink per week    Drug use: No    Sexual activity: Not on file         Current Outpatient Medications:     ascorbic acid (VITAMIN C) 1000 MG tablet, Take 1 tablet (1,000 mg total) by mouth every 12 (twelve) hours for 10 doses, Disp: 10 tablet, Rfl: 0    atorvastatin (LIPITOR) 20 mg tablet, Take 1 tablet (20 mg total) by mouth daily at bedtime (Patient not taking: Reported on 9/13/2022), Disp: 90 tablet, Rfl: 1    benazepril (LOTENSIN) 20 mg tablet, Take 0 5 tablets (10 mg total) by mouth daily (Patient not taking: No sig reported), Disp: 90 tablet, Rfl: 1    cholecalciferol (VITAMIN D3) 1,000 units tablet, Take 2 tablets (2,000 Units total) by mouth daily (Patient not taking: No sig reported), Disp: 20 tablet, Rfl: 0    clobetasol (TEMOVATE) 0 05 % cream, APPLY UP TO TWICE DAILY TO SKIN OR ECZEMA (Patient not taking: No sig reported), Disp: , Rfl: 0    Dapagliflozin Propanediol (Farxiga) 10 MG TABS, Take 1 tablet (10 mg total) by mouth daily (Patient not taking: Reported on 9/13/2022), Disp: to distal ilac artery.  Reperfusion quality was fair.  Ultrasound, open and closed satisfactory.  There was an excellent pulse in the main renal artery, the segmental flow was visible.   Intraoperative urine production was observed.       Interval History: no acute events overnight. Feels well today. C/o significant itching with oxycodone impaired sleep overnight, pain meds changed to hydrocodone. Cr 6.2 similar to yesterday, likely plateau, expect some DGF related to surgery events. Excellent uop, gallardo for 5 days (due to be removed 4/13), SARA with minimal ss drg. LUE AVF no thrill/no bruit, VAS US scheduled for afternoon to assess AVF. H/o AVF clotting requiring plavix use. She is tolerating a diet, no N/V, passing some flatus, (-) BM, on bowel regimen (h/o chronic constipation), reduce narcotic use. Ambulating with minimal assistance. BP remains slightly elevated but improved, dc clonidine patch, increased coreg dose. Scheduled for surveillance kidney US tomorrow. Continue renal diet, renvela started for hyperphosphatemia.       Past Medical, Surgical, Family, and Social History:   Unchanged from H&P.    Scheduled Meds:   amLODIPine  10 mg Oral Daily    carvedilol  12.5 mg Oral BID WM    cinacalcet  30 mg Oral Daily with breakfast    cloNIDine  0.1 mg Oral BID    docusate sodium  100 mg Oral TID    famotidine  20 mg Oral QHS    heparin (porcine)  5,000 Units Subcutaneous Q8H    linaclotide  290 mcg Oral Daily    multivitamin  1 tablet Oral Daily    mupirocin  1 g Nasal BID    mycophenolate  500 mg Oral BID    nystatin  500,000 Units Mouth/Throat QID (PC + HS)    sevelamer carbonate  800 mg Oral TID WM    sulfamethoxazole-trimethoprim 400-80mg  1 tablet Oral Every Mon, Wed, Fri    tacrolimus  3 mg Oral BID    traZODone  50 mg Oral QHS    [START ON 4/19/2019] valGANciclovir  450 mg Oral Daily     Continuous Infusions:   glucagon (human recombinant)       PRN Meds:acetaminophen, dextrose 50%,  30 tablet, Rfl: 3    glucose blood test strip, test blood sugar twice daily or prn (Patient not taking: No sig reported), Disp: , Rfl:     hydrocortisone 1 % lotion, APPLY AT BEDTIME (Patient not taking: No sig reported), Disp: , Rfl: 3    Insulin Degludec-Liraglutide (Insulin Degludec-Liraglutide) 100 units-3 6 mg/mL injection pen, inject (16UNITS) every morning; may increase by 2u every 3 days until B is below 120 in am (Patient not taking: No sig reported), Disp: , Rfl:     Insulin Pen Needle (PEN NEEDLES) 32G X 4 MM MISC, inject once daily as directed (Patient not taking: No sig reported), Disp: , Rfl:     levothyroxine 200 mcg tablet, Take 200 mcg by mouth daily, Disp: , Rfl: 6    levothyroxine 25 mcg tablet, Take 25 mcg by mouth daily, Disp: , Rfl:     metFORMIN (GLUCOPHAGE-XR) 750 mg 24 hr tablet, Take 1 tablet (750 mg total) by mouth daily with breakfast, Disp: 90 tablet, Rfl: 1    multivitamin-minerals (CENTRUM ADULTS) tablet, Take 1 tablet by mouth daily (Patient not taking: No sig reported), Disp: 30 tablet, Rfl: 0    zinc sulfate (ZINCATE) 220 mg capsule, Take 1 capsule (220 mg total) by mouth daily for 5 doses, Disp: 5 capsule, Rfl: 0    No Known Allergies    Objective:  Vitals:    09/15/22 0831   BP: 110/75   Pulse: 92       Right Knee Exam     Tenderness   The patient is experiencing tenderness in the patellar tendon  Range of Motion   Extension: 0   Flexion: 120     Other   Erythema: absent  Sensation: normal  Pulse: present  Swelling: none  Effusion: no effusion present    Comments:  + patellar grind          Observations     Right Knee   Negative for effusion  Physical Exam  Constitutional:       Appearance: He is well-developed  HENT:      Head: Normocephalic and atraumatic  Eyes:      General:         Right eye: No discharge  Left eye: No discharge  Conjunctiva/sclera: Conjunctivae normal    Cardiovascular:      Rate and Rhythm: Normal rate     Pulmonary: diphenhydrAMINE, glucagon (human recombinant), glucose, glucose, glucose, hydrALAZINE, HYDROcodone-acetaminophen, HYDROcodone-acetaminophen, insulin aspart U-100, naloxone, ondansetron    Intake/Output - Last 3 Shifts       04/08 0700 - 04/09 0659 04/09 0700 - 04/10 0659 04/10 0700 - 04/11 0659    P.O. 1980 2100 300    I.V. (mL/kg) 6731.7 (75.6) 1459.6 (16.3)     Other  0     Total Intake(mL/kg) 8711.7 (97.9) 3559.6 (39.8) 300 (3.4)    Urine (mL/kg/hr) 4905 (2.3) 2900 (1.4) 400 (1)    Drains 60 40     Blood 250      Total Output 5215 2940 400    Net +3496.7 +619.6 -100                  Review of Systems   Constitutional: Positive for activity change and appetite change (fair/decrease). Negative for chills, fatigue, fever and unexpected weight change.   HENT: Negative.  Negative for postnasal drip.    Eyes: Negative.  Negative for visual disturbance.   Respiratory: Negative for cough, shortness of breath and wheezing.    Cardiovascular: Positive for leg swelling. Negative for chest pain and palpitations.   Gastrointestinal: Positive for abdominal distention, abdominal pain and constipation. Negative for anal bleeding, blood in stool, diarrhea, nausea, rectal pain and vomiting.   Genitourinary: Negative for decreased urine volume, difficulty urinating, dysuria, hematuria and urgency.   Musculoskeletal: Negative for arthralgias and gait problem.   Skin: Positive for wound. Negative for rash.   Allergic/Immunologic: Positive for immunocompromised state.   Neurological: Negative for dizziness, light-headedness and headaches.   Psychiatric/Behavioral: The patient is not nervous/anxious.       Objective:     Vital Signs (Most Recent):  Temp: 97.7 °F (36.5 °C) (04/10/19 0804)  Pulse: 79 (04/10/19 0950)  Resp: 19 (04/10/19 0950)  BP: (!) 146/89 (04/10/19 0950)  SpO2: (!) 94 % (04/10/19 0950) Vital Signs (24h Range):  Temp:  [97.7 °F (36.5 °C)-98.4 °F (36.9 °C)] 97.7 °F (36.5 °C)  Pulse:  [] 79  Resp:  [14-24] 19  SpO2:  " [92 %-98 %] 94 %  BP: (136-204)/() 146/89     Weight: 89.4 kg (197 lb 1.5 oz)  Height: 5' 3" (160 cm)  Body mass index is 34.91 kg/m².    Physical Exam   Constitutional: She is oriented to person, place, and time. She is active and cooperative.   LUE AVF (-) thrill/(-) bruit   Cardiovascular: Normal rate, regular rhythm, normal heart sounds, intact distal pulses and normal pulses.   No murmur heard.  Pulmonary/Chest: Effort normal and breath sounds normal. No respiratory distress. She has no decreased breath sounds. She has no wheezes. She has no rales.   Abdominal: Soft. Normal appearance and bowel sounds are normal. She exhibits no distension. There is no tenderness. There is no guarding.   RLQ incision LOKESH with staples, soft, non tender to palpation  R SARA with minimal ss drg   Genitourinary:   Genitourinary Comments: Davison for 5 days (due to be removed 4/13), draining clear, yellow urine   Musculoskeletal: Normal range of motion. She exhibits edema (+1 generalized).   Neurological: She is alert and oriented to person, place, and time.   Skin: Skin is warm, dry and intact.   Nursing note and vitals reviewed.      Laboratory:  CBC:   Recent Labs   Lab 04/09/19  0600 04/09/19  1519 04/10/19  0500   WBC 19.79* 19.30* 15.55*   RBC 3.38* 3.55* 3.18*   HGB 10.1* 10.5* 9.7*   HCT 31.4* 32.9* 29.1*    161 104*   MCV 93 93 92   MCH 29.9 29.6 30.5   MCHC 32.2 31.9* 33.3     CMP:   Recent Labs   Lab 04/09/19  0600 04/09/19  1519 04/10/19  0500    136* 120*   CALCIUM 9.1 9.4 9.4   ALBUMIN 2.8*  2.8* 3.1* 2.9*   PROT 6.7  --   --     135* 135*   K 4.9 4.6 4.7   CO2 24 21* 22*    100 102   BUN 81* 79* 84*   CREATININE 6.2* 6.4* 6.2*   ALKPHOS 67  --   --    ALT 15  --   --    AST 65*  --   --      Coagulation: No results for input(s): PT, APTT in the last 168 hours.  Labs within the past 24 hours have been reviewed.    Diagnostic Results:  US - Kidney:   Results for orders placed during the " Effort: Pulmonary effort is normal  No respiratory distress  Musculoskeletal:      Cervical back: Normal range of motion and neck supple  Right knee: No effusion  Comments: As noted in HPI   Skin:     General: Skin is warm and dry  Neurological:      Mental Status: He is alert and oriented to person, place, and time  Psychiatric:         Behavior: Behavior normal          Thought Content: Thought content normal          Judgment: Judgment normal          This document was created using speech voice recognition software  Grammatical errors, random word insertions, pronoun errors, and incomplete sentences are an occasional consequence of this system due to software limitations, ambient noise, and hardware issues  Any formal questions or concerns about content, text, or information contained within the body of this dictation should be directly addressed to the provider for clarification  hospital encounter of 04/08/19   US Transplant Kidney With Doppler    Narrative EXAMINATION:  US TRANSPLANT KIDNEY WITH DOPPLER    CLINICAL HISTORY:  assess perfusion;    TECHNIQUE:  Real time gray scale and doppler ultrasound was performed over the patient's renal allograft.    COMPARISON:  Intraoperative ultrasound 04/08/2019.    FINDINGS:  Renal allograft in the right lower quadrant.  The allograft measures 11.5 cm. Normal perfusion. No hydronephrosis.    Two peritransplant fluid collections measuring 4.4 x 1.1 x 3.6 cm and 3.4 x 2.9 x 3.4 cm.    Vasculature:    Resistive indices ranged from 0.67 to 0.74.    Main renal artery peak systolic velocity: 248cm/sec with normal waveform.    Renal artery/iliac ratio: 2.2.    The main renal vein is patent.      Impression Satisfactory day 1 postop gray scale and doppler evaluation of renal allograft.    Two small peritransplant fluid collections.    COMMUNICATION  This result was relayed directly by Dr. Dalton by phone to Dr. Goodwin.    Electronically signed by resident: Valerio Montanez MD  Date:    04/09/2019  Time:    08:03    Electronically signed by: Pan Dalton MD  Date:    04/09/2019  Time:    08:20     Assessment/Plan:     * S/P living-donor kidney transplantation  - ESRD 2/2 Hypertensive Nephrosclerosis.  S/p living unrelated (friend) kidney transplant 4/8/19, CMV +/+, WIT 30 min, CIT 3h 9 m, HD TTS.  Dry weight 83 kg.  Native UOP <100 ml.    - Surgery significant for intra op US w slightly decreased perfusion related to thrombus in main artery. Kidney removed, pair-of-pants anastomosis converted to double artery, and kidney was replaced (see OR note for full report).   - Kidney US 4/9 with good perfusion, plan to repeat US 4/11 for surveillance  - Cr 6.2 similar to prior day, likely plateau. Excellent uop. No RRT today but expect some DGF related to surgical events  - Davison for 5 days (to be removed 4/13), SARA x 1  - Pain well controlled, changed to  hydrocodone due to severe itching with oxycodone  - Tolerating diet, (+) flatus, (-) BM, continue bowel regimen, h/o chronic constipation requiring linzess which was restarted    Hyperphosphatemia  - continue renal diet. Renvela started. Expect kidney function to improve then renvela can be d/c'd    Acute hyperglycemia  - related to steroids. No h/o DM. Endocrine consulted.     Hypertension, renal disease, stage 5 chronic kidney disease or end stage renal disease  - Pt was on Clonidine patch 0.1 mg, Coreg 6.25 --> increased to 12.5mg BID, and Norvasc 10 mg qd.    - Post transplant, clonidine patch d/c'd, started on clonidine 0.1 mg PO bid started w holding parameters.    AV fistula thrombosis  - Pt last used AVF on Saturday for HD.  On am exam, no thrill/bruit noted.    - Pt has hx of AVF thrombosis (per pt 9 times) and was on Plavix for reoccurring thrombus.    - VAS HD access US today    Hypercalcemia  - 9.1, .  Sensipar started.      At risk for opportunistic infections  - Opportunistic infection prophylaxis will include Valcyte for 3 months  to start on POD#10 or day of discharge (CMV D + , R + ), Bactrim for 1 year, and nystatin for 4 weeks.    Long-term use of immunosuppressant medication  - see prophylactic immnuotherapy    Prophylactic immunotherapy  - Patients immunosuppression will include Campath for induction with early steroid withdrawal and Prograf and Cellcept for maintenance immunosuppression.    - cont to check prograf level daily.  Assess for toxicity and adjust level as needed    Presence of surgical incision  - RLQ incision w staples CDI.  Monitor.      Secondary hyperparathyroidism of renal origin  - started Sensipar.    Anemia of renal disease  - h/h stable.  Monitor.        Discharge Planning: not stable for discharge at this time. Possible discharge tomorrow.     Janette Fajardo, CHARLIE  Kidney Transplant  [facility]

## 2022-10-03 ENCOUNTER — APPOINTMENT (OUTPATIENT)
Dept: LAB | Facility: CLINIC | Age: 47
End: 2022-10-03
Payer: COMMERCIAL

## 2022-10-03 DIAGNOSIS — E11.65 UNCONTROLLED TYPE 2 DIABETES MELLITUS WITH HYPERGLYCEMIA (HCC): ICD-10-CM

## 2022-10-03 LAB
ALBUMIN SERPL BCP-MCNC: 4.3 G/DL (ref 3.5–5)
ALP SERPL-CCNC: 64 U/L (ref 34–104)
ALT SERPL W P-5'-P-CCNC: 19 U/L (ref 7–52)
ANION GAP SERPL CALCULATED.3IONS-SCNC: 5 MMOL/L (ref 4–13)
AST SERPL W P-5'-P-CCNC: 12 U/L (ref 13–39)
BASOPHILS # BLD AUTO: 0.06 THOUSANDS/ΜL (ref 0–0.1)
BASOPHILS NFR BLD AUTO: 1 % (ref 0–1)
BILIRUB SERPL-MCNC: 0.93 MG/DL (ref 0.2–1)
BUN SERPL-MCNC: 13 MG/DL (ref 5–25)
CALCIUM SERPL-MCNC: 8.6 MG/DL (ref 8.4–10.2)
CHLORIDE SERPL-SCNC: 104 MMOL/L (ref 96–108)
CHOLEST SERPL-MCNC: 115 MG/DL
CO2 SERPL-SCNC: 29 MMOL/L (ref 21–32)
CREAT SERPL-MCNC: 0.94 MG/DL (ref 0.6–1.3)
CREAT UR-MCNC: 72.1 MG/DL
EOSINOPHIL # BLD AUTO: 0.12 THOUSAND/ΜL (ref 0–0.61)
EOSINOPHIL NFR BLD AUTO: 3 % (ref 0–6)
ERYTHROCYTE [DISTWIDTH] IN BLOOD BY AUTOMATED COUNT: 12.9 % (ref 11.6–15.1)
EST. AVERAGE GLUCOSE BLD GHB EST-MCNC: 186 MG/DL
GFR SERPL CREATININE-BSD FRML MDRD: 95 ML/MIN/1.73SQ M
GLUCOSE P FAST SERPL-MCNC: 194 MG/DL (ref 65–99)
HBA1C MFR BLD: 8.1 %
HCT VFR BLD AUTO: 45.8 % (ref 36.5–49.3)
HDLC SERPL-MCNC: 34 MG/DL
HGB BLD-MCNC: 15.6 G/DL (ref 12–17)
IMM GRANULOCYTES # BLD AUTO: 0.04 THOUSAND/UL (ref 0–0.2)
IMM GRANULOCYTES NFR BLD AUTO: 1 % (ref 0–2)
LDLC SERPL CALC-MCNC: 65 MG/DL (ref 0–100)
LYMPHOCYTES # BLD AUTO: 1.36 THOUSANDS/ΜL (ref 0.6–4.47)
LYMPHOCYTES NFR BLD AUTO: 30 % (ref 14–44)
MCH RBC QN AUTO: 29.7 PG (ref 26.8–34.3)
MCHC RBC AUTO-ENTMCNC: 34.1 G/DL (ref 31.4–37.4)
MCV RBC AUTO: 87 FL (ref 82–98)
MICROALBUMIN UR-MCNC: <5 MG/L (ref 0–20)
MICROALBUMIN/CREAT 24H UR: <7 MG/G CREATININE (ref 0–30)
MONOCYTES # BLD AUTO: 0.36 THOUSAND/ΜL (ref 0.17–1.22)
MONOCYTES NFR BLD AUTO: 8 % (ref 4–12)
NEUTROPHILS # BLD AUTO: 2.53 THOUSANDS/ΜL (ref 1.85–7.62)
NEUTS SEG NFR BLD AUTO: 57 % (ref 43–75)
NRBC BLD AUTO-RTO: 0 /100 WBCS
PLATELET # BLD AUTO: 162 THOUSANDS/UL (ref 149–390)
PMV BLD AUTO: 9.5 FL (ref 8.9–12.7)
POTASSIUM SERPL-SCNC: 3.6 MMOL/L (ref 3.5–5.3)
PROT SERPL-MCNC: 6.9 G/DL (ref 6.4–8.4)
RBC # BLD AUTO: 5.25 MILLION/UL (ref 3.88–5.62)
SODIUM SERPL-SCNC: 138 MMOL/L (ref 135–147)
T4 FREE SERPL-MCNC: 1.48 NG/DL (ref 0.76–1.46)
TRIGL SERPL-MCNC: 78 MG/DL
TSH SERPL DL<=0.05 MIU/L-ACNC: 9.67 UIU/ML (ref 0.45–4.5)
WBC # BLD AUTO: 4.47 THOUSAND/UL (ref 4.31–10.16)

## 2022-10-03 PROCEDURE — 82043 UR ALBUMIN QUANTITATIVE: CPT | Performed by: FAMILY MEDICINE

## 2022-10-03 PROCEDURE — 80053 COMPREHEN METABOLIC PANEL: CPT

## 2022-10-03 PROCEDURE — 80061 LIPID PANEL: CPT

## 2022-10-03 PROCEDURE — 84443 ASSAY THYROID STIM HORMONE: CPT

## 2022-10-03 PROCEDURE — 84439 ASSAY OF FREE THYROXINE: CPT

## 2022-10-03 PROCEDURE — 83036 HEMOGLOBIN GLYCOSYLATED A1C: CPT

## 2022-10-03 PROCEDURE — 85025 COMPLETE CBC W/AUTO DIFF WBC: CPT

## 2022-10-03 PROCEDURE — 36415 COLL VENOUS BLD VENIPUNCTURE: CPT

## 2022-10-03 PROCEDURE — 82570 ASSAY OF URINE CREATININE: CPT | Performed by: FAMILY MEDICINE

## 2022-10-07 ENCOUNTER — OFFICE VISIT (OUTPATIENT)
Dept: FAMILY MEDICINE CLINIC | Facility: CLINIC | Age: 47
End: 2022-10-07
Payer: COMMERCIAL

## 2022-10-07 VITALS
SYSTOLIC BLOOD PRESSURE: 134 MMHG | RESPIRATION RATE: 16 BRPM | TEMPERATURE: 97.9 F | WEIGHT: 198 LBS | DIASTOLIC BLOOD PRESSURE: 86 MMHG | HEART RATE: 100 BPM | OXYGEN SATURATION: 97 % | BODY MASS INDEX: 30.01 KG/M2 | HEIGHT: 68 IN

## 2022-10-07 DIAGNOSIS — C73 MALIGNANT NEOPLASM OF THYROID GLAND (HCC): ICD-10-CM

## 2022-10-07 DIAGNOSIS — E89.0 POSTOPERATIVE HYPOTHYROIDISM: ICD-10-CM

## 2022-10-07 DIAGNOSIS — E78.2 MIXED HYPERLIPIDEMIA: ICD-10-CM

## 2022-10-07 DIAGNOSIS — I10 ESSENTIAL HYPERTENSION: ICD-10-CM

## 2022-10-07 DIAGNOSIS — E11.65 UNCONTROLLED TYPE 2 DIABETES MELLITUS WITH HYPERGLYCEMIA (HCC): Primary | ICD-10-CM

## 2022-10-07 PROCEDURE — 99214 OFFICE O/P EST MOD 30 MIN: CPT | Performed by: FAMILY MEDICINE

## 2022-10-07 RX ORDER — GLIPIZIDE 2.5 MG/1
2.5 TABLET, EXTENDED RELEASE ORAL DAILY
Qty: 90 TABLET | Refills: 3 | Status: SHIPPED | OUTPATIENT
Start: 2022-10-07

## 2022-10-07 NOTE — PROGRESS NOTES
Name: Lucius Whitmore      : 1975      MRN: [id number]  Encounter Provider: Agnieszka Mays MD  Encounter Date: 10/7/2022   Encounter department: [address] [place] [address]  Uncontrolled type 2 diabetes mellitus with hyperglycemia [facility])  Assessment & Plan:    Lab Results   Component Value Date    HGBA1C 8 1 (H) 10/03/2022     Not well controlled but improving  He has been doing better with his diet and taking his medications on time  I am going to change his medication took Xigduo XR 1000/10 mg daily  I am also going to add glipizide 2 5 mg daily after breakfast   Discussed about possible side effects of the medications  He refuses to monitor his sugar at home  He is agreeable to come back in 2 months for follow-up  Orders:  -     Dapagliflozin-metFORMIN HCl ER  MG TB24; Take 1 tablet by mouth in the morning  -     glipiZIDE (GLUCOTROL XL) 2 5 mg 24 hr tablet; Take 1 tablet (2 5 mg total) by mouth daily    2  Postoperative hypothyroidism  Assessment & Plan:    TSH came back elevated but improved from before  Continue same and will continue to monitor  3  Malignant neoplasm of thyroid gland [facility])  Assessment & Plan:    Stable continue with Synthroid replacement treatment  4  Essential hypertension  Assessment & Plan:    Well controlled  Continue same  Will continue to monitor  5  Mixed hyperlipidemia  Assessment & Plan:    Stable  Continue same  Will continue to monitor  Subjective     Here today for follow-up multiple medical problems  He was seen here a month ago and his medications were adjusted  Since then he has been taking his medications as prescribed  He has been trying to watch his diet  He continues to take metformin 750 mg daily in addition to [place] that was added last visit  He does not monitor his sugar at home  He had blood work done showed his A1c improved to 8 1    Also his TSH came back elevated but improved from before  He denies any complaint today  Review of Systems   Constitutional: Negative for chills and fever  HENT: Negative for trouble swallowing  Eyes: Negative for visual disturbance  Respiratory: Negative for cough and shortness of breath  Cardiovascular: Negative for chest pain and palpitations  Gastrointestinal: Negative for abdominal pain, blood in stool and vomiting  Endocrine: Negative for cold intolerance and heat intolerance  Genitourinary: Negative for difficulty urinating and dysuria  Musculoskeletal: Negative for gait problem  Skin: Negative for rash  Neurological: Negative for dizziness, syncope and headaches  Hematological: Negative for adenopathy  Psychiatric/Behavioral: Negative for behavioral problems         Past Medical History:   Diagnosis Date    Cancer [facility])     Thyroid cancer    Diabetes mellitus ([address] )     Disease of thyroid gland     Hyperlipidemia     Hypertension     Postoperative urinary retention     Seizures (HCC)     x1 at age of 5     Past Surgical History:   Procedure Laterality Date    NECK SURGERY  04/21/2017    "fatty tumor removal"    [place] LAP,CHOLECYSTECTOMY N/A 8/29/2022    Procedure: LAPAROSCOPIC CHOLECYSTECTOMY;  Surgeon: Kannan Buchanan MD;  Location: [address] [place] [address];  Service: General    THYROIDECTOMY      WISDOM TOOTH EXTRACTION       Family History   Problem Relation Age of Onset    Diabetes type II Mother         MELLITUS    COPD Father     Cancer Father         MB 2/4/16    LYMPHOMA CANCER     Social History     Socioeconomic History    Marital status: /Civil Union     Spouse name: None    Number of children: None    Years of education: None    Highest education level: None   Occupational History    None   Tobacco Use    Smoking status: Never Smoker    Smokeless tobacco: Never Used   Vaping Use    Vaping Use: Never used   Substance and Sexual Activity    Alcohol use: Yes     Comment: less than 1 drink per week    Drug use: No    Sexual activity: None   Other Topics Concern    None   Social History Narrative    None     Social Determinants of Health     Financial Resource Strain: Not on file   Food Insecurity: Not on file   Transportation Needs: Not on file   Physical Activity: Not on file   Stress: Not on file   Social Connections: Not on file   Intimate Partner Violence: Not on file   Housing Stability: Not on file     Current Outpatient Medications on File Prior to Visit   Medication Sig    levothyroxine 200 mcg tablet Take 200 mcg by mouth daily    levothyroxine 25 mcg tablet Take 25 mcg by mouth daily    ascorbic acid (VITAMIN C) 1000 MG tablet Take 1 tablet (1,000 mg total) by mouth every 12 (twelve) hours for 10 doses    atorvastatin (LIPITOR) 20 mg tablet Take 1 tablet (20 mg total) by mouth daily at bedtime (Patient not taking: Reported on 9/13/2022)    benazepril (LOTENSIN) 20 mg tablet Take 0 5 tablets (10 mg total) by mouth daily (Patient not taking: No sig reported)    cholecalciferol (VITAMIN D3) 1,000 units tablet Take 2 tablets (2,000 Units total) by mouth daily (Patient not taking: No sig reported)    clobetasol (TEMOVATE) 0 05 % cream APPLY UP TO TWICE DAILY TO SKIN OR ECZEMA (Patient not taking: No sig reported)    Dapagliflozin Propanediol (Farxiga) 10 MG TABS Take 1 tablet (10 mg total) by mouth daily (Patient not taking: Reported on 9/13/2022)    glucose blood test strip test blood sugar twice daily or prn (Patient not taking: No sig reported)    hydrocortisone 1 % lotion APPLY AT BEDTIME (Patient not taking: No sig reported)    Insulin Degludec-Liraglutide (Insulin Degludec-Liraglutide) 100 units-3 6 mg/mL injection pen inject (16UNITS) every morning; may increase by 2u every 3 days until B is below 120 in am (Patient not taking: No sig reported)    Insulin Pen Needle (PEN NEEDLES) 32G X 4 MM MISC inject once daily as directed (Patient not taking: No sig reported)    multivitamin-minerals (CENTRUM ADULTS) tablet Take 1 tablet by mouth daily (Patient not taking: No sig reported)    zinc sulfate (ZINCATE) 220 mg capsule Take 1 capsule (220 mg total) by mouth daily for 5 doses     No Known Allergies  Immunization History   Administered Date(s) Administered    INFLUENZA 10/21/2020, 10/15/2021    Influenza, injectable, quadrivalent, preservative free 0 5 mL 09/30/2019, 10/21/2020    Td (adult), adsorbed 12/29/2010       Objective     /86 (BP Location: Left arm, Patient Position: Sitting, Cuff Size: Standard)   Pulse 100   Temp 97 9 °F (36 6 °C) (Tympanic)   Resp 16   Ht 5' 8" (1 727 m)   Wt 89 8 kg (198 lb)   SpO2 97%   BMI 30 11 kg/m²     Physical Exam  Vitals and nursing note reviewed  Constitutional:       Appearance: He is well-developed  HENT:      Head: Normocephalic and atraumatic  Eyes:      Pupils: Pupils are equal, round, and reactive to light  Cardiovascular:      Rate and Rhythm: Normal rate and regular rhythm  Heart sounds: Normal heart sounds  Pulmonary:      Effort: Pulmonary effort is normal       Breath sounds: Normal breath sounds  Abdominal:      General: Bowel sounds are normal       Palpations: Abdomen is soft  Musculoskeletal:         General: Normal range of motion  Cervical back: Normal range of motion and neck supple  Lymphadenopathy:      Cervical: No cervical adenopathy  Skin:     General: Skin is warm  Findings: No rash  Neurological:      Mental Status: He is alert and oriented to person, place, and time  Cranial Nerves: No cranial nerve deficit         Ramesh Ramon MD

## 2022-10-08 NOTE — ASSESSMENT & PLAN NOTE
Lab Results   Component Value Date    HGBA1C 8 1 (H) 10/03/2022     Not well controlled but improving  He has been doing better with his diet and taking his medications on time  I am going to change his medication took Xigduo XR 1000/10 mg daily  I am also going to add glipizide 2 5 mg daily after breakfast   Discussed about possible side effects of the medications  He refuses to monitor his sugar at home  He is agreeable to come back in 2 months for follow-up

## 2022-10-11 PROBLEM — N12 PYELONEPHRITIS: Status: RESOLVED | Noted: 2022-07-28 | Resolved: 2022-10-11

## 2022-10-11 PROBLEM — Z12.5 PROSTATE CANCER SCREENING: Status: RESOLVED | Noted: 2022-07-28 | Resolved: 2022-10-11

## 2022-10-11 PROBLEM — Z00.00 WELL ADULT EXAM: Status: RESOLVED | Noted: 2022-07-28 | Resolved: 2022-10-11

## 2022-10-12 PROBLEM — U07.1 PNEUMONIA DUE TO COVID-19 VIRUS: Status: RESOLVED | Noted: 2021-04-20 | Resolved: 2022-10-12

## 2022-10-12 PROBLEM — B97.89 VIRAL SEPSIS (HCC): Status: RESOLVED | Noted: 2021-04-20 | Resolved: 2022-10-12

## 2022-10-12 PROBLEM — J12.82 PNEUMONIA DUE TO COVID-19 VIRUS: Status: RESOLVED | Noted: 2021-04-20 | Resolved: 2022-10-12

## 2022-10-12 PROBLEM — A41.89 VIRAL SEPSIS (HCC): Status: RESOLVED | Noted: 2021-04-20 | Resolved: 2022-10-12

## 2022-10-26 ENCOUNTER — APPOINTMENT (EMERGENCY)
Dept: RADIOLOGY | Facility: HOSPITAL | Age: 47
End: 2022-10-26
Payer: COMMERCIAL

## 2022-10-26 ENCOUNTER — HOSPITAL ENCOUNTER (EMERGENCY)
Facility: HOSPITAL | Age: 47
Discharge: HOME | End: 2022-10-26
Attending: EMERGENCY MEDICINE
Payer: COMMERCIAL

## 2022-10-26 VITALS
SYSTOLIC BLOOD PRESSURE: 146 MMHG | HEART RATE: 89 BPM | RESPIRATION RATE: 14 BRPM | OXYGEN SATURATION: 99 % | TEMPERATURE: 98 F | DIASTOLIC BLOOD PRESSURE: 97 MMHG

## 2022-10-26 DIAGNOSIS — M79.673 HEEL PAIN: Primary | ICD-10-CM

## 2022-10-26 DIAGNOSIS — M72.2 PLANTAR FASCIITIS: ICD-10-CM

## 2022-10-26 PROCEDURE — 73630 X-RAY EXAM OF FOOT: CPT

## 2022-10-26 PROCEDURE — 99284 EMERGENCY DEPT VISIT MOD MDM: CPT

## 2022-10-26 RX ORDER — NAPROXEN 250 MG/1
500 TABLET ORAL ONCE
Status: COMPLETED | OUTPATIENT
Start: 2022-10-26 | End: 2022-10-26

## 2022-10-26 RX ORDER — NAPROXEN 500 MG/1
500 TABLET ORAL 2 TIMES DAILY WITH MEALS
Qty: 30 TABLET | Refills: 0 | Status: SHIPPED | OUTPATIENT
Start: 2022-10-26

## 2022-10-26 RX ADMIN — NAPROXEN 500 MG: 250 TABLET ORAL at 09:39

## 2022-10-26 NOTE — ED PROVIDER NOTES
History  Chief Complaint   Patient presents with   • Foot Pain     Pt presents to the ED with c/o left foot pain  Pt feels like something is stuck in his foot since Saturday  43-year-old male with history of type 2 diabetes presenting with pain in his left heel since Saturday  Patient states that he woke up and was unsure if he had got a splinter or swelling in his heel but the pain did not go away since then  Patient states that the pain is not being in worse but has been persistent  Patient denies any numbness tingling in his toes or any pain anywhere else  Patient denies any traumas  Patient denies ever having this before  Patient denies any chest pain, shortness of breath, nausea, vomiting, diarrhea, constipation, fever, swelling in the affected extremity, difficulty walking, or any other symptoms at this time  Prior to Admission Medications   Prescriptions Last Dose Informant Patient Reported? Taking?    Dapagliflozin Propanediol (Farxiga) 10 MG TABS   No No   Sig: Take 1 tablet (10 mg total) by mouth daily   Patient not taking: Reported on 9/13/2022   Dapagliflozin-metFORMIN HCl ER  MG TB24   No No   Sig: Take 1 tablet by mouth in the morning   Insulin Degludec-Liraglutide (Insulin Degludec-Liraglutide) 100 units-3 6 mg/mL injection pen   Yes No   Sig: inject (16UNITS) every morning; may increase by 2u every 3 days until B is below 120 in am   Patient not taking: No sig reported   Insulin Pen Needle (PEN NEEDLES) 32G X 4 MM MISC   Yes No   Sig: inject once daily as directed   Patient not taking: No sig reported   ascorbic acid (VITAMIN C) 1000 MG tablet   No No   Sig: Take 1 tablet (1,000 mg total) by mouth every 12 (twelve) hours for 10 doses   atorvastatin (LIPITOR) 20 mg tablet   No No   Sig: Take 1 tablet (20 mg total) by mouth daily at bedtime   Patient not taking: Reported on 9/13/2022   benazepril (LOTENSIN) 20 mg tablet   No No   Sig: Take 0 5 tablets (10 mg total) by mouth daily   Patient not taking: No sig reported   cholecalciferol (VITAMIN D3) 1,000 units tablet   No No   Sig: Take 2 tablets (2,000 Units total) by mouth daily   Patient not taking: No sig reported   clobetasol (TEMOVATE) 0 05 % cream   Yes No   Sig: APPLY UP TO TWICE DAILY TO SKIN OR ECZEMA   Patient not taking: No sig reported   glipiZIDE (GLUCOTROL XL) 2 5 mg 24 hr tablet   No No   Sig: Take 1 tablet (2 5 mg total) by mouth daily   glucose blood test strip   Yes No   Sig: test blood sugar twice daily or prn   Patient not taking: No sig reported   hydrocortisone 1 % lotion   Yes No   Sig: APPLY AT BEDTIME   Patient not taking: No sig reported   levothyroxine 200 mcg tablet   Yes No   Sig: Take 200 mcg by mouth daily   levothyroxine 25 mcg tablet   Yes No   Sig: Take 25 mcg by mouth daily   multivitamin-minerals (CENTRUM ADULTS) tablet   No No   Sig: Take 1 tablet by mouth daily   Patient not taking: No sig reported   zinc sulfate (ZINCATE) 220 mg capsule   No No   Sig: Take 1 capsule (220 mg total) by mouth daily for 5 doses      Facility-Administered Medications: None       Past Medical History:   Diagnosis Date   • Cancer ([facility] )     Thyroid cancer   • Diabetes mellitus (HCC)    • Disease of thyroid gland    • Hyperlipidemia    • Hypertension    • Postoperative urinary retention    • Seizures ([facility] [address] )     x1 at age of 5       Past Surgical History:   Procedure Laterality Date   • NECK SURGERY  04/21/2017    "fatty tumor removal"   • SD LAP,CHOLECYSTECTOMY N/A 8/29/2022    Procedure: LAPAROSCOPIC CHOLECYSTECTOMY;  Surgeon: Maggie Brannon MD;  Location: [address] [place] [address];  Service: General   • THYROIDECTOMY     • WISDOM TOOTH EXTRACTION         Family History   Problem Relation Age of Onset   • Diabetes type II Mother         MELLITUS   • COPD Father    • Cancer Father         MB 2/4/16    LYMPHOMA CANCER     I have reviewed and agree with the history as documented      E-Cigarette/Vaping   • E-Cigarette Use Never User E-Cigarette/Vaping Substances   • Nicotine No    • THC No    • CBD No    • Flavoring No    • Other No    • Unknown No      Social History     Tobacco Use   • Smoking status: Never Smoker   • Smokeless tobacco: Never Used   Vaping Use   • Vaping Use: Never used   Substance Use Topics   • Alcohol use: Yes     Comment: less than 1 drink per week   • Drug use: No       Review of Systems   Constitutional: Negative for chills and fever  HENT: Negative for ear pain, sore throat and trouble swallowing  Eyes: Negative for pain and visual disturbance  Respiratory: Negative for cough and shortness of breath  Cardiovascular: Negative for chest pain, palpitations and leg swelling  Gastrointestinal: Negative for abdominal pain and vomiting  Genitourinary: Negative for dysuria and hematuria  Musculoskeletal: Positive for arthralgias (L Heel)  Negative for back pain, gait problem, joint swelling, myalgias, neck pain and neck stiffness  Skin: Negative for color change and rash  Neurological: Negative for dizziness, seizures, syncope and weakness  All other systems reviewed and are negative  Physical Exam  Physical Exam  Vitals and nursing note reviewed  Constitutional:       Appearance: He is well-developed  HENT:      Head: Normocephalic and atraumatic  Eyes:      Conjunctiva/sclera: Conjunctivae normal    Cardiovascular:      Rate and Rhythm: Normal rate and regular rhythm  Heart sounds: No murmur heard  Pulmonary:      Effort: Pulmonary effort is normal  No respiratory distress  Breath sounds: Normal breath sounds  No stridor  Abdominal:      Palpations: Abdomen is soft  Tenderness: There is no abdominal tenderness  Musculoskeletal:         General: Tenderness present  No swelling, deformity or signs of injury  Normal range of motion  Cervical back: Neck supple  Right lower leg: No edema  Left lower leg: No edema  Skin:     General: Skin is warm and dry  Capillary Refill: Capillary refill takes less than 2 seconds  Coloration: Skin is not jaundiced or pale  Findings: No bruising, erythema, lesion or rash  Neurological:      Mental Status: He is alert  Vital Signs  ED Triage Vitals [10/26/22 0835]   Temperature Pulse Respirations Blood Pressure SpO2   98 °F (36 7 °C) 89 14 146/97 99 %      Temp Source Heart Rate Source Patient Position - Orthostatic VS BP Location FiO2 (%)   Oral Monitor -- Right arm --      Pain Score       10 - Worst Possible Pain           Vitals:    10/26/22 0835   BP: 146/97   Pulse: 89         Visual Acuity      ED Medications  Medications   naproxen (NAPROSYN) tablet 500 mg (500 mg Oral Given 10/26/22 8254)       Diagnostic Studies  Results Reviewed     None                 XR foot 3+ views LEFT   Final Result by Nikia Estrada MD (10/26 0932)      No acute osseous abnormality  Workstation performed: [id number]                    Procedures  Procedures         ED Course                                             MDM  Number of Diagnoses or Management Options  Heel pain  Plantar fasciitis  Diagnosis management comments: 49year old male presenting with pain in his left heel  X-ray left foot was unremarkable for any acute fractures or any heel spur  Patient diagnosed with plantar fasciitis  Given naproxen for pain  Podiatry follow-up  Patient instructed on how to stretch his calf with foam roller or rolled up towel and informed to take naproxen no sent to his pharmacy in the morning before he goes to work or he is going to walk a lot that day  XR L foot for diagnosis          Disposition  Final diagnoses:   Heel pain   Plantar fasciitis     Time reflects when diagnosis was documented in both MDM as applicable and the Disposition within this note     Time User Action Codes Description Comment    10/26/2022  9:38 AM Danny Schafer [C07 079] Heel pain     10/26/2022  9:40 AM Danny Schafer [M72 2] Plantar fasciitis       ED Disposition     ED Disposition   Discharge    Condition   Stable    Date/Time   Wed Oct 26, 2022  9:52 AM    Comment   Elma Desai discharge to home/self care                 Follow-up Information     Follow up With Specialties Details Why Contact Info Additional [address] Emergency Department Emergency Medicine Go to  If symptoms worsen [address] [facility] [address] [facility] Emergency Department, [address], [place], [place], [address]    [facility] Schedule an appointment as soon as possible for a visit   [address] [facility] [address]  [address] [facility], [address] [place] [address], [id number] [phone]          Patient's Medications   Discharge Prescriptions    NAPROXEN (NAPROSYN) 500 MG TABLET    Take 1 tablet (500 mg total) by mouth 2 (two) times a day with meals       Start Date: 10/26/2022End Date: --       Order Dose: 500 mg       Quantity: 30 tablet    Refills: 0           PDMP Review     None          ED Provider  Electronically Signed by           Kathy Gooden PA-C  10/26/22 1011

## 2022-10-28 ENCOUNTER — PROCEDURE VISIT (OUTPATIENT)
Dept: OBGYN CLINIC | Facility: CLINIC | Age: 47
End: 2022-10-28

## 2022-10-28 VITALS — WEIGHT: 198 LBS | BODY MASS INDEX: 30.01 KG/M2 | HEIGHT: 68 IN

## 2022-10-28 DIAGNOSIS — M17.11 PATELLOFEMORAL ARTHRITIS OF RIGHT KNEE: Primary | ICD-10-CM

## 2022-10-28 RX ORDER — HYALURONATE SODIUM 10 MG/ML
20 SYRINGE (ML) INTRAARTICULAR
Status: COMPLETED | OUTPATIENT
Start: 2022-10-28 | End: 2022-10-28

## 2022-10-28 RX ADMIN — Medication 20 MG: at 15:03

## 2022-10-28 NOTE — PROGRESS NOTES
1  Patellofemoral arthritis of right knee  Large joint arthrocentesis: R knee     Patient is here for his 1st injection of Eulfexxa into the right knee  Patient reports right knee pain  All organ systems normal  Physical exam of the knee shows no effusion no ecchymosis  Large joint arthrocentesis: R knee  Universal Protocol:  Consent: Verbal consent obtained  Risks and benefits: risks, benefits and alternatives were discussed  Consent given by: patient  Patient understanding: patient states understanding of the procedure being performed  Patient consent: the patient's understanding of the procedure matches consent given  Radiology Images displayed and confirmed   If images not available, report reviewed: imaging studies available  Patient identity confirmed: verbally with patient    Supporting Documentation  Indications: pain   Procedure Details  Location: knee - R knee  Preparation: Patient was prepped and draped in the usual sterile fashion  Needle size: 22 G  Ultrasound guidance: no  Approach: anterolateral  Medications administered: 20 mg Sodium Hyaluronate 20 MG/2ML    Patient tolerance: patient tolerated the procedure well with no immediate complications  Dressing:  Sterile dressing applied          Patient tolerated procedure follow up in 1 week for next injection in the series

## 2022-11-04 ENCOUNTER — PROCEDURE VISIT (OUTPATIENT)
Dept: OBGYN CLINIC | Facility: CLINIC | Age: 47
End: 2022-11-04

## 2022-11-04 VITALS
WEIGHT: 198 LBS | DIASTOLIC BLOOD PRESSURE: 82 MMHG | HEART RATE: 73 BPM | HEIGHT: 68 IN | BODY MASS INDEX: 30.01 KG/M2 | SYSTOLIC BLOOD PRESSURE: 135 MMHG

## 2022-11-04 DIAGNOSIS — M17.11 PATELLOFEMORAL ARTHRITIS OF RIGHT KNEE: Primary | ICD-10-CM

## 2022-11-04 RX ORDER — HYALURONATE SODIUM 10 MG/ML
20 SYRINGE (ML) INTRAARTICULAR
Status: COMPLETED | OUTPATIENT
Start: 2022-11-04 | End: 2022-11-04

## 2022-11-04 RX ADMIN — Medication 20 MG: at 07:44

## 2022-11-04 NOTE — PROGRESS NOTES
1  Patellofemoral arthritis of right knee  Large joint arthrocentesis: R knee     Patient is here for his 1st injection of euflexxa into the right knee  Patient reports no significant knee pain today  Physical exam of the knee shows no effusion no ecchymosis  All other organ systems normal    Large joint arthrocentesis: R knee  Universal Protocol:  Consent given by: patient  Time out: Immediately prior to procedure a "time out" was called to verify the correct patient, procedure, equipment, support staff and site/side marked as required    Site marked: the operative site was marked  Supporting Documentation  Indications: pain   Procedure Details  Location: knee - R knee  Preparation: Patient was prepped and draped in the usual sterile fashion  Needle size: 22 G  Ultrasound guidance: no  Approach: anterolateral  Medications administered: 20 mg Sodium Hyaluronate 20 MG/2ML    Patient tolerance: patient tolerated the procedure well with no immediate complications  Dressing:  Sterile dressing applied          Patient tolerated procedure follow up 1 week

## 2022-11-10 ENCOUNTER — VBI (OUTPATIENT)
Dept: ADMINISTRATIVE | Facility: OTHER | Age: 47
End: 2022-11-10

## 2022-11-11 ENCOUNTER — PROCEDURE VISIT (OUTPATIENT)
Dept: OBGYN CLINIC | Facility: CLINIC | Age: 47
End: 2022-11-11

## 2022-11-11 VITALS — WEIGHT: 198 LBS | HEIGHT: 68 IN | BODY MASS INDEX: 30.01 KG/M2

## 2022-11-11 DIAGNOSIS — M17.11 PATELLOFEMORAL ARTHRITIS OF RIGHT KNEE: Primary | ICD-10-CM

## 2022-11-11 RX ORDER — HYALURONATE SODIUM 10 MG/ML
20 SYRINGE (ML) INTRAARTICULAR
Status: COMPLETED | OUTPATIENT
Start: 2022-11-11 | End: 2022-11-11

## 2022-11-11 RX ADMIN — Medication 20 MG: at 14:48

## 2022-11-11 NOTE — PROGRESS NOTES
1  Patellofemoral arthritis of right knee       Patient is here for his 3rd injection of Euflexxa into the right knee  Patient reports pain but does feel better at times but in when he kneels on it it seems aggravated and does have some good days and some bad  All organ systems normal  Physical exam of the knee shows no effusion no ecchymosis        Large joint arthrocentesis: R knee  Universal Protocol:  Consent given by: patient    Supporting Documentation  Indications: pain   Procedure Details  Location: knee - R knee  Needle size: 22 G  Ultrasound guidance: no  Approach: anterolateral  Medications administered: 20 mg Sodium Hyaluronate 20 MG/2ML    Patient tolerance: patient tolerated the procedure well with no immediate complications          Patient tolerated procedure follow up p r n  I did talk to her about having a cortisone injection if inflammation persists

## 2022-11-14 ENCOUNTER — VBI (OUTPATIENT)
Dept: ADMINISTRATIVE | Facility: OTHER | Age: 47
End: 2022-11-14

## 2023-01-06 ENCOUNTER — VBI (OUTPATIENT)
Dept: ADMINISTRATIVE | Facility: OTHER | Age: 48
End: 2023-01-06

## 2023-01-19 DIAGNOSIS — E78.2 MIXED HYPERLIPIDEMIA: ICD-10-CM

## 2023-01-20 RX ORDER — ATORVASTATIN CALCIUM 20 MG/1
TABLET, FILM COATED ORAL
Qty: 90 TABLET | Refills: 1 | Status: SHIPPED | OUTPATIENT
Start: 2023-01-20

## 2023-05-11 DIAGNOSIS — E11.65 UNCONTROLLED TYPE 2 DIABETES MELLITUS WITH HYPERGLYCEMIA (HCC): ICD-10-CM

## 2023-05-11 RX ORDER — DAPAGLIFLOZIN AND METFORMIN HYDROCHLORIDE 10; 1000 MG/1; MG/1
TABLET, FILM COATED, EXTENDED RELEASE ORAL
Qty: 30 TABLET | Refills: 5 | Status: SHIPPED | OUTPATIENT
Start: 2023-05-11

## 2023-05-11 NOTE — TELEPHONE ENCOUNTER
Pharmacy requesting refill on xigduo   Last seen 10/7/22  Has appt 5/26/23  Medication sent to pharmacy

## 2023-05-15 ENCOUNTER — TELEPHONE (OUTPATIENT)
Dept: FAMILY MEDICINE CLINIC | Facility: CLINIC | Age: 48
End: 2023-05-15

## 2023-05-15 DIAGNOSIS — E89.0 POSTOPERATIVE HYPOTHYROIDISM: ICD-10-CM

## 2023-05-15 DIAGNOSIS — I10 ESSENTIAL HYPERTENSION: ICD-10-CM

## 2023-05-15 DIAGNOSIS — K76.9 LIVER LESION: ICD-10-CM

## 2023-05-15 DIAGNOSIS — E78.2 MIXED HYPERLIPIDEMIA: ICD-10-CM

## 2023-05-15 DIAGNOSIS — E11.65 UNCONTROLLED TYPE 2 DIABETES MELLITUS WITH HYPERGLYCEMIA (HCC): Primary | ICD-10-CM

## 2023-05-15 NOTE — TELEPHONE ENCOUNTER
Hi, this is [facility] calling  Reason why I'm calling because I need to have the paperwork for blood work to know when I'm getting blood work for save an appointment on May 26th, 2023 at the office  So I just need blood work paperwork done  So if you can please give me a call back at [phone] again [phone]  I would appreciate it  Thank you  You received a voice mail from [place]

## 2023-05-20 ENCOUNTER — APPOINTMENT (OUTPATIENT)
Dept: LAB | Facility: CLINIC | Age: 48
End: 2023-05-20

## 2023-05-20 DIAGNOSIS — I10 ESSENTIAL HYPERTENSION: ICD-10-CM

## 2023-05-20 DIAGNOSIS — E11.65 UNCONTROLLED TYPE 2 DIABETES MELLITUS WITH HYPERGLYCEMIA (HCC): ICD-10-CM

## 2023-05-20 DIAGNOSIS — E89.0 POSTOPERATIVE HYPOTHYROIDISM: ICD-10-CM

## 2023-05-20 DIAGNOSIS — K76.9 LIVER LESION: ICD-10-CM

## 2023-05-20 DIAGNOSIS — E78.2 MIXED HYPERLIPIDEMIA: ICD-10-CM

## 2023-05-20 LAB
ALBUMIN SERPL BCP-MCNC: 4.5 G/DL (ref 3.5–5)
ALP SERPL-CCNC: 70 U/L (ref 34–104)
ALT SERPL W P-5'-P-CCNC: 20 U/L (ref 7–52)
ANION GAP SERPL CALCULATED.3IONS-SCNC: 7 MMOL/L (ref 4–13)
AST SERPL W P-5'-P-CCNC: 13 U/L (ref 13–39)
BASOPHILS # BLD AUTO: 0.06 THOUSANDS/ÂΜL (ref 0–0.1)
BASOPHILS NFR BLD AUTO: 1 % (ref 0–1)
BILIRUB SERPL-MCNC: 1.11 MG/DL (ref 0.2–1)
BUN SERPL-MCNC: 16 MG/DL (ref 5–25)
CALCIUM SERPL-MCNC: 8.5 MG/DL (ref 8.4–10.2)
CHLORIDE SERPL-SCNC: 103 MMOL/L (ref 96–108)
CHOLEST SERPL-MCNC: 134 MG/DL
CO2 SERPL-SCNC: 28 MMOL/L (ref 21–32)
CREAT SERPL-MCNC: 0.92 MG/DL (ref 0.6–1.3)
CREAT UR-MCNC: 70.6 MG/DL
EOSINOPHIL # BLD AUTO: 0.14 THOUSAND/ÂΜL (ref 0–0.61)
EOSINOPHIL NFR BLD AUTO: 3 % (ref 0–6)
ERYTHROCYTE [DISTWIDTH] IN BLOOD BY AUTOMATED COUNT: 12.7 % (ref 11.6–15.1)
EST. AVERAGE GLUCOSE BLD GHB EST-MCNC: 169 MG/DL
GFR SERPL CREATININE-BSD FRML MDRD: 98 ML/MIN/1.73SQ M
GLUCOSE P FAST SERPL-MCNC: 253 MG/DL (ref 65–99)
HBA1C MFR BLD: 7.5 %
HCT VFR BLD AUTO: 48.4 % (ref 36.5–49.3)
HDLC SERPL-MCNC: 33 MG/DL
HGB BLD-MCNC: 16.7 G/DL (ref 12–17)
IMM GRANULOCYTES # BLD AUTO: 0.06 THOUSAND/UL (ref 0–0.2)
IMM GRANULOCYTES NFR BLD AUTO: 1 % (ref 0–2)
LDLC SERPL CALC-MCNC: 71 MG/DL (ref 0–100)
LYMPHOCYTES # BLD AUTO: 1.11 THOUSANDS/ÂΜL (ref 0.6–4.47)
LYMPHOCYTES NFR BLD AUTO: 20 % (ref 14–44)
MCH RBC QN AUTO: 29.7 PG (ref 26.8–34.3)
MCHC RBC AUTO-ENTMCNC: 34.5 G/DL (ref 31.4–37.4)
MCV RBC AUTO: 86 FL (ref 82–98)
MICROALBUMIN UR-MCNC: <5 MG/L (ref 0–20)
MICROALBUMIN/CREAT 24H UR: <7 MG/G CREATININE (ref 0–30)
MONOCYTES # BLD AUTO: 0.46 THOUSAND/ÂΜL (ref 0.17–1.22)
MONOCYTES NFR BLD AUTO: 8 % (ref 4–12)
NEUTROPHILS # BLD AUTO: 3.86 THOUSANDS/ÂΜL (ref 1.85–7.62)
NEUTS SEG NFR BLD AUTO: 67 % (ref 43–75)
NRBC BLD AUTO-RTO: 0 /100 WBCS
PLATELET # BLD AUTO: 194 THOUSANDS/UL (ref 149–390)
PMV BLD AUTO: 9.5 FL (ref 8.9–12.7)
POTASSIUM SERPL-SCNC: 4 MMOL/L (ref 3.5–5.3)
PROT SERPL-MCNC: 7.4 G/DL (ref 6.4–8.4)
RBC # BLD AUTO: 5.62 MILLION/UL (ref 3.88–5.62)
SODIUM SERPL-SCNC: 138 MMOL/L (ref 135–147)
T4 FREE SERPL-MCNC: 1.07 NG/DL (ref 0.61–1.12)
TRIGL SERPL-MCNC: 150 MG/DL
TSH SERPL DL<=0.05 MIU/L-ACNC: 14.1 UIU/ML (ref 0.45–4.5)
WBC # BLD AUTO: 5.69 THOUSAND/UL (ref 4.31–10.16)

## 2023-05-25 ENCOUNTER — TELEPHONE (OUTPATIENT)
Dept: FAMILY MEDICINE CLINIC | Facility: CLINIC | Age: 48
End: 2023-05-25

## 2023-05-25 NOTE — TELEPHONE ENCOUNTER
----- Message from Amelie Solitario MD sent at 5/20/2023  1:45 PM EDT -----  His blood work came back showing A1c improved to 7 5  His fasting glucose was elevated  His TSH came back elevated at  Please make sure he is following with his endocrinologist for adjustments for his levothyroxine  All the rest of blood work came back stable

## 2023-05-25 NOTE — TELEPHONE ENCOUNTER
Pt aware of results- pt stated he did not take his thyroid medication for a few days   Pt has appt tomorrow

## 2023-05-26 ENCOUNTER — OFFICE VISIT (OUTPATIENT)
Dept: FAMILY MEDICINE CLINIC | Facility: CLINIC | Age: 48
End: 2023-05-26

## 2023-05-26 VITALS
HEART RATE: 100 BPM | HEIGHT: 68 IN | TEMPERATURE: 99.1 F | BODY MASS INDEX: 31.25 KG/M2 | RESPIRATION RATE: 16 BRPM | WEIGHT: 206.2 LBS | SYSTOLIC BLOOD PRESSURE: 116 MMHG | DIASTOLIC BLOOD PRESSURE: 76 MMHG | OXYGEN SATURATION: 97 %

## 2023-05-26 DIAGNOSIS — N52.8 OTHER MALE ERECTILE DYSFUNCTION: ICD-10-CM

## 2023-05-26 DIAGNOSIS — Z00.00 HEALTHCARE MAINTENANCE: ICD-10-CM

## 2023-05-26 DIAGNOSIS — I10 ESSENTIAL HYPERTENSION: ICD-10-CM

## 2023-05-26 DIAGNOSIS — E11.65 UNCONTROLLED TYPE 2 DIABETES MELLITUS WITH HYPERGLYCEMIA (HCC): Primary | ICD-10-CM

## 2023-05-26 DIAGNOSIS — E89.0 POSTOPERATIVE HYPOTHYROIDISM: ICD-10-CM

## 2023-05-26 DIAGNOSIS — C73 MALIGNANT NEOPLASM OF THYROID GLAND (HCC): ICD-10-CM

## 2023-05-26 DIAGNOSIS — E78.2 MIXED HYPERLIPIDEMIA: ICD-10-CM

## 2023-05-26 RX ORDER — LEVOTHYROXINE SODIUM 0.05 MG/1
50 TABLET ORAL DAILY
Qty: 90 TABLET | Refills: 1 | Status: SHIPPED | OUTPATIENT
Start: 2023-05-26

## 2023-05-26 RX ORDER — GLIPIZIDE 5 MG/1
5 TABLET, FILM COATED, EXTENDED RELEASE ORAL DAILY
Qty: 90 TABLET | Refills: 1 | Status: SHIPPED | OUTPATIENT
Start: 2023-05-26

## 2023-05-26 RX ORDER — SILDENAFIL 100 MG/1
100 TABLET, FILM COATED ORAL DAILY PRN
Qty: 30 TABLET | Refills: 1 | Status: SHIPPED | OUTPATIENT
Start: 2023-05-26

## 2023-05-26 RX ORDER — LEVOTHYROXINE SODIUM 0.2 MG/1
200 TABLET ORAL DAILY
Qty: 90 TABLET | Refills: 1 | Status: SHIPPED | OUTPATIENT
Start: 2023-05-26

## 2023-05-26 NOTE — PROGRESS NOTES
Name: Brigida Wellington      : 1975      MRN: [id number]  Encounter Provider: Esperanza Freeman MD  Encounter Date: 2023   Encounter department: [address] [place] [address]  Uncontrolled type 2 diabetes mellitus with hyperglycemia ([facility] [address] )  Assessment & Plan:  Not well controlled but improving  I am going to increase glipizide to 5 mg daily  He is to continue on Xigduo  We will continue to monitor  Lab Results   Component Value Date    HGBA1C 7 5 (H) 2023       Orders:  -     Hemoglobin A1C; Future; Expected date: 2023  -     Comprehensive metabolic panel; Future; Expected date: 2023  -     CBC and differential; Future; Expected date: 2023  -     Lipid Panel with Direct LDL reflex; Future; Expected date: 2023  -     TSH, 3rd generation with Free T4 reflex; Future; Expected date: 2023  -     glipiZIDE (GLUCOTROL XL) 5 mg 24 hr tablet; Take 1 tablet (5 mg total) by mouth daily    2  Mixed hyperlipidemia  Assessment & Plan:  Stable  Continue to follow low-fat diet and regular exercise  Continue atorvastatin 10 mg daily  We will continue to monitor  3  Postoperative hypothyroidism  Assessment & Plan:  Not well controlled  I am going to increase levothyroxine to 250 mcg daily  Was discussed with patient to make sure to take medication as prescribed  I will repeat in 3 months  Orders:  -     levothyroxine 50 mcg tablet; Take 1 tablet (50 mcg total) by mouth daily  -     levothyroxine 200 mcg tablet; Take 1 tablet (200 mcg total) by mouth daily    4  Other male erectile dysfunction  Assessment & Plan:  He was given prescription for Viagra  Discussed with possible side effect  We will continue to monitor  Orders:  -     sildenafil (VIAGRA) 100 mg tablet; Take 1 tablet (100 mg total) by mouth daily as needed for erectile dysfunction    5  Malignant neoplasm of thyroid gland [facility])  Assessment & Plan:  Stable    Continue thyroid replacement treatment  6  Essential hypertension  Assessment & Plan:  Well-controlled  Continue same  We will continue to monitor  7  Healthcare maintenance  Assessment & Plan: It was discussed about immunizations, diet, exercise and safety measures  8  BMI 31 0-31 9,adult        Depression Screening and Follow-up Plan: Patient was screened for depression during today's encounter  They screened negative with a PHQ-2 score of 0  Subjective     Is here today for follow-up multiple medical problems  He has not been taking his levothyroxine on a daily basis  He has blood work done recently showed A1c slightly elevated but improved from before  His TSH came back elevated  He stated he has not been following with endocrinologist and he does not take his levothyroxine every day  Diabetes  Pertinent negatives for hypoglycemia include no dizziness or headaches  Pertinent negatives for diabetes include no chest pain  Review of Systems   Constitutional: Negative for chills and fever  HENT: Negative for trouble swallowing  Eyes: Negative for visual disturbance  Respiratory: Negative for cough and shortness of breath  Cardiovascular: Negative for chest pain and palpitations  Gastrointestinal: Negative for abdominal pain, blood in stool and vomiting  Endocrine: Negative for cold intolerance and heat intolerance  Genitourinary: Negative for difficulty urinating and dysuria  Erectile dysfunction   Musculoskeletal: Negative for gait problem  Skin: Negative for rash  Neurological: Negative for dizziness, syncope and headaches  Hematological: Negative for adenopathy  Psychiatric/Behavioral: Negative for behavioral problems         Past Medical History:   Diagnosis Date   • Cancer [facility])     Thyroid cancer   • Diabetes mellitus ([facility] )    • Disease of thyroid gland    • Hyperlipidemia    • Hypertension    • Postoperative urinary retention    • Seizures ([facility] )     x1 "at age of 5     Past Surgical History:   Procedure Laterality Date   • NECK SURGERY  04/21/2017    \"fatty tumor removal\"   • [place] LAPAROSCOPY SURG CHOLECYSTECTOMY N/A 8/29/2022    Procedure: LAPAROSCOPIC CHOLECYSTECTOMY;  Surgeon: Peng Bañuelos MD;  Location: [address] [place] [address] MAIN OR;  Service: General   • THYROIDECTOMY     • WISDOM TOOTH EXTRACTION       Family History   Problem Relation Age of Onset   • Diabetes type II Mother         MELLITUS   • COPD Father    • Cancer Father         MB 2/4/16    LYMPHOMA CANCER     Social History     Socioeconomic History   • Marital status: /Civil Union     Spouse name: None   • Number of children: None   • Years of education: None   • Highest education level: None   Occupational History   • None   Tobacco Use   • Smoking status: Never   • Smokeless tobacco: Never   Vaping Use   • Vaping Use: Never used   Substance and Sexual Activity   • Alcohol use: Yes     Comment: less than 1 drink per week   • Drug use: No   • Sexual activity: None   Other Topics Concern   • None   Social History Narrative   • None     Social Determinants of Health     Financial Resource Strain: Not on file   Food Insecurity: Not on file   Transportation Needs: Not on file   Physical Activity: Not on file   Stress: Not on file   Social Connections: Not on file   Intimate Partner Violence: Not on file   Housing Stability: Not on file     Current Outpatient Medications on File Prior to Visit   Medication Sig   • atorvastatin (LIPITOR) 20 mg tablet TAKE 1 TABLET BY MOUTH DAILY AT BEDTIME   • [DISCONTINUED] glipiZIDE (GLUCOTROL XL) 2 5 mg 24 hr tablet Take 1 tablet (2 5 mg total) by mouth daily   • [DISCONTINUED] levothyroxine 200 mcg tablet Take 200 mcg by mouth daily   • [DISCONTINUED] levothyroxine 25 mcg tablet Take 25 mcg by mouth daily   • ascorbic acid (VITAMIN C) 1000 MG tablet Take 1 tablet (1,000 mg total) by mouth every 12 (twelve) hours for 10 doses   • benazepril (LOTENSIN) 20 mg tablet Take 0 5 tablets " (10 mg total) by mouth daily (Patient not taking: No sig reported)   • cholecalciferol (VITAMIN D3) 1,000 units tablet Take 2 tablets (2,000 Units total) by mouth daily (Patient not taking: No sig reported)   • clobetasol (TEMOVATE) 0 05 % cream APPLY UP TO TWICE DAILY TO SKIN OR ECZEMA (Patient not taking: No sig reported)   • Dapagliflozin Propanediol (Farxiga) 10 MG TABS Take 1 tablet (10 mg total) by mouth daily (Patient not taking: Reported on 9/13/2022)   • glucose blood test strip test blood sugar twice daily or prn (Patient not taking: No sig reported)   • hydrocortisone 1 % lotion APPLY AT BEDTIME (Patient not taking: No sig reported)   • Insulin Degludec-Liraglutide (Insulin Degludec-Liraglutide) 100 units-3 6 mg/mL injection pen inject (16UNITS) every morning; may increase by 2u every 3 days until B is below 120 in am (Patient not taking: No sig reported)   • Insulin Pen Needle (PEN NEEDLES) 32G X 4 MM MISC inject once daily as directed (Patient not taking: No sig reported)   • multivitamin-minerals (CENTRUM ADULTS) tablet Take 1 tablet by mouth daily (Patient not taking: No sig reported)   • naproxen (Naprosyn) 500 mg tablet Take 1 tablet (500 mg total) by mouth 2 (two) times a day with meals (Patient not taking: Reported on 5/26/2023)   • Xigduo XR  MG TB24 TAKE 1 TABLET BY MOUTH EVERY DAY IN THE MORNING   • zinc sulfate (ZINCATE) 220 mg capsule Take 1 capsule (220 mg total) by mouth daily for 5 doses     No Known Allergies  Immunization History   Administered Date(s) Administered   • COVID-19 Moderna Vac BIVALENT 12 Yr+ IM (BOOSTER ONLY) 0 5 ML 10/23/2022   • COVID-19 PFIZER VACCINE 0 3 ML IM 05/15/2021, 06/05/2021, 12/19/2021   • INFLUENZA 10/21/2020, 10/15/2021, 09/08/2022   • Influenza, injectable, quadrivalent, preservative free 0 5 mL 09/30/2019, 10/21/2020   • Td (adult), adsorbed 12/29/2010   • Tdap 08/11/2021       Objective     /76 (BP Location: Left arm, Patient Position: "Sitting, Cuff Size: Large)   Pulse 100   Temp 99 1 °F (37 3 °C) (Tympanic)   Resp 16   Ht 5' 8\" (1 727 m)   Wt 93 5 kg (206 lb 3 2 oz)   SpO2 97%   BMI 31 35 kg/m²     Physical Exam  Vitals and nursing note reviewed  Constitutional:       Appearance: He is well-developed  HENT:      Head: Normocephalic and atraumatic  Eyes:      Pupils: Pupils are equal, round, and reactive to light  Cardiovascular:      Rate and Rhythm: Normal rate and regular rhythm  Heart sounds: Normal heart sounds  Pulmonary:      Effort: Pulmonary effort is normal       Breath sounds: Normal breath sounds  Abdominal:      General: Bowel sounds are normal       Palpations: Abdomen is soft  Musculoskeletal:         General: Normal range of motion  Cervical back: Normal range of motion and neck supple  Lymphadenopathy:      Cervical: No cervical adenopathy  Skin:     General: Skin is warm  Findings: No rash  Neurological:      Mental Status: He is alert and oriented to person, place, and time  Cranial Nerves: No cranial nerve deficit  Dona Fiore MD BMI Counseling: Body mass index is 31 35 kg/m²  The BMI is above normal  Nutrition recommendations include reducing portion sizes, decreasing overall calorie intake and 3-5 servings of fruits/vegetables daily  Exercise recommendations include moderate aerobic physical activity for 150 minutes/week    "

## 2023-05-26 NOTE — PROGRESS NOTES
Name: Montrell Josue      : 1975      MRN: [id number]  Encounter Provider: Dona Fiore MD  Encounter Date: 2023   Encounter department: [address] [place] [address]  Uncontrolled type 2 diabetes mellitus with hyperglycemia ([facility] [address] )  Assessment & Plan:  Not well controlled but improving  I am going to increase glipizide to 5 mg daily  He is to continue on Xigduo  We will continue to monitor  Lab Results   Component Value Date    HGBA1C 7 5 (H) 2023       Orders:  -     Hemoglobin A1C; Future; Expected date: 2023  -     Comprehensive metabolic panel; Future; Expected date: 2023  -     CBC and differential; Future; Expected date: 2023  -     Lipid Panel with Direct LDL reflex; Future; Expected date: 2023  -     TSH, 3rd generation with Free T4 reflex; Future; Expected date: 2023  -     glipiZIDE (GLUCOTROL XL) 5 mg 24 hr tablet; Take 1 tablet (5 mg total) by mouth daily    2  Mixed hyperlipidemia  Assessment & Plan:  Stable  Continue to follow low-fat diet and regular exercise  Continue atorvastatin 10 mg daily  We will continue to monitor  3  Postoperative hypothyroidism  Assessment & Plan:  Not well controlled  I am going to increase levothyroxine to 250 mcg daily  Was discussed with patient to make sure to take medication as prescribed  I will repeat in 3 months  Orders:  -     levothyroxine 50 mcg tablet; Take 1 tablet (50 mcg total) by mouth daily  -     levothyroxine 200 mcg tablet; Take 1 tablet (200 mcg total) by mouth daily    4  Other male erectile dysfunction  Assessment & Plan:  He was given prescription for Viagra  Discussed with possible side effect  We will continue to monitor  Orders:  -     sildenafil (VIAGRA) 100 mg tablet; Take 1 tablet (100 mg total) by mouth daily as needed for erectile dysfunction    5  Malignant neoplasm of thyroid gland [facility])  Assessment & Plan:  Stable    Continue "thyroid replacement treatment  6  Essential hypertension  Assessment & Plan:  Well-controlled  Continue same  We will continue to monitor  7  Healthcare maintenance  Assessment & Plan: It was discussed about immunizations, diet, exercise and safety measures  8  BMI 31 0-31 9,adult           Subjective     Is here today for follow-up multiple medical problems  He has not been taking his levothyroxine on a daily basis  He has blood work done recently showed A1c slightly elevated but improved from before  His TSH came back elevated  He stated he has not been following with endocrinologist and he does not take his levothyroxine every day  Review of Systems   Constitutional: Negative for chills and fever  HENT: Negative for trouble swallowing  Eyes: Negative for visual disturbance  Respiratory: Negative for cough and shortness of breath  Cardiovascular: Negative for chest pain and palpitations  Gastrointestinal: Negative for abdominal pain, blood in stool and vomiting  Endocrine: Negative for cold intolerance and heat intolerance  Genitourinary: Negative for difficulty urinating and dysuria  Erectile dysfunction   Musculoskeletal: Negative for gait problem  Skin: Negative for rash  Neurological: Negative for dizziness, syncope and headaches  Hematological: Negative for adenopathy  Psychiatric/Behavioral: Negative for behavioral problems         Past Medical History:   Diagnosis Date   • Cancer [facility])     Thyroid cancer   • Diabetes mellitus ([facility] [address] )    • Disease of thyroid gland    • Hyperlipidemia    • Hypertension    • Postoperative urinary retention    • Seizures ([facility])     x1 at age of 5     Past Surgical History:   Procedure Laterality Date   • NECK SURGERY  04/21/2017    \"fatty tumor removal\"   • [place] LAPAROSCOPY SURG CHOLECYSTECTOMY N/A 8/29/2022    Procedure: LAPAROSCOPIC CHOLECYSTECTOMY;  Surgeon: Shalini Kilpatrick MD;  Location: [address] [place] [address];  Service: General   • " THYROIDECTOMY     • WISDOM TOOTH EXTRACTION       Family History   Problem Relation Age of Onset   • Diabetes type II Mother         MELLITUS   • COPD Father    • Cancer Father         MB 2/4/16    LYMPHOMA CANCER     Social History     Socioeconomic History   • Marital status: /Civil Union     Spouse name: None   • Number of children: None   • Years of education: None   • Highest education level: None   Occupational History   • None   Tobacco Use   • Smoking status: Never   • Smokeless tobacco: Never   Vaping Use   • Vaping Use: Never used   Substance and Sexual Activity   • Alcohol use: Yes     Comment: less than 1 drink per week   • Drug use: No   • Sexual activity: None   Other Topics Concern   • None   Social History Narrative   • None     Social Determinants of Health     Financial Resource Strain: Not on file   Food Insecurity: Not on file   Transportation Needs: Not on file   Physical Activity: Not on file   Stress: Not on file   Social Connections: Not on file   Intimate Partner Violence: Not on file   Housing Stability: Not on file     Current Outpatient Medications on File Prior to Visit   Medication Sig   • atorvastatin (LIPITOR) 20 mg tablet TAKE 1 TABLET BY MOUTH DAILY AT BEDTIME   • [DISCONTINUED] glipiZIDE (GLUCOTROL XL) 2 5 mg 24 hr tablet Take 1 tablet (2 5 mg total) by mouth daily   • [DISCONTINUED] levothyroxine 200 mcg tablet Take 200 mcg by mouth daily   • [DISCONTINUED] levothyroxine 25 mcg tablet Take 25 mcg by mouth daily   • ascorbic acid (VITAMIN C) 1000 MG tablet Take 1 tablet (1,000 mg total) by mouth every 12 (twelve) hours for 10 doses   • benazepril (LOTENSIN) 20 mg tablet Take 0 5 tablets (10 mg total) by mouth daily (Patient not taking: No sig reported)   • cholecalciferol (VITAMIN D3) 1,000 units tablet Take 2 tablets (2,000 Units total) by mouth daily (Patient not taking: No sig reported)   • clobetasol (TEMOVATE) 0 05 % cream APPLY UP TO TWICE DAILY TO SKIN OR ECZEMA "(Patient not taking: No sig reported)   • Dapagliflozin Propanediol (Farxiga) 10 MG TABS Take 1 tablet (10 mg total) by mouth daily (Patient not taking: Reported on 9/13/2022)   • glucose blood test strip test blood sugar twice daily or prn (Patient not taking: No sig reported)   • hydrocortisone 1 % lotion APPLY AT BEDTIME (Patient not taking: No sig reported)   • Insulin Degludec-Liraglutide (Insulin Degludec-Liraglutide) 100 units-3 6 mg/mL injection pen inject (16UNITS) every morning; may increase by 2u every 3 days until B is below 120 in am (Patient not taking: No sig reported)   • Insulin Pen Needle (PEN NEEDLES) 32G X 4 MM MISC inject once daily as directed (Patient not taking: No sig reported)   • multivitamin-minerals (CENTRUM ADULTS) tablet Take 1 tablet by mouth daily (Patient not taking: No sig reported)   • naproxen (Naprosyn) 500 mg tablet Take 1 tablet (500 mg total) by mouth 2 (two) times a day with meals (Patient not taking: Reported on 5/26/2023)   • Xigduo XR  MG TB24 TAKE 1 TABLET BY MOUTH EVERY DAY IN THE MORNING   • zinc sulfate (ZINCATE) 220 mg capsule Take 1 capsule (220 mg total) by mouth daily for 5 doses     No Known Allergies  Immunization History   Administered Date(s) Administered   • COVID-19 Moderna Vac BIVALENT 12 Yr+ IM (BOOSTER ONLY) 0 5 ML 10/23/2022   • COVID-19 PFIZER VACCINE 0 3 ML IM 05/15/2021, 06/05/2021, 12/19/2021   • INFLUENZA 10/21/2020, 10/15/2021, 09/08/2022   • Influenza, injectable, quadrivalent, preservative free 0 5 mL 09/30/2019, 10/21/2020   • Td (adult), adsorbed 12/29/2010   • Tdap 08/11/2021       Objective     /76 (BP Location: Left arm, Patient Position: Sitting, Cuff Size: Large)   Pulse 100   Temp 99 1 °F (37 3 °C) (Tympanic)   Resp 16   Ht 5' 8\" (1 727 m)   Wt 93 5 kg (206 lb 3 2 oz)   SpO2 97%   BMI 31 35 kg/m²     Physical Exam  Vitals and nursing note reviewed  Constitutional:       Appearance: He is well-developed     HENT:      " Head: Normocephalic and atraumatic  Eyes:      Pupils: Pupils are equal, round, and reactive to light  Cardiovascular:      Rate and Rhythm: Normal rate and regular rhythm  Heart sounds: Normal heart sounds  Pulmonary:      Effort: Pulmonary effort is normal       Breath sounds: Normal breath sounds  Abdominal:      General: Bowel sounds are normal       Palpations: Abdomen is soft  Musculoskeletal:         General: Normal range of motion  Cervical back: Normal range of motion and neck supple  Lymphadenopathy:      Cervical: No cervical adenopathy  Skin:     General: Skin is warm  Findings: No rash  Neurological:      Mental Status: He is alert and oriented to person, place, and time  Cranial Nerves: No cranial nerve deficit  Ele Teague MD BMI Counseling: Body mass index is 31 35 kg/m²  The BMI is above normal  Nutrition recommendations include reducing portion sizes, decreasing overall calorie intake and 3-5 servings of fruits/vegetables daily  Exercise recommendations include moderate aerobic physical activity for 150 minutes/week

## 2023-05-26 NOTE — ASSESSMENT & PLAN NOTE
He was given prescription for Viagra  Discussed with possible side effect  We will continue to monitor

## 2023-05-26 NOTE — ASSESSMENT & PLAN NOTE
Not well controlled but improving  I am going to increase glipizide to 5 mg daily  He is to continue on Xigduo  We will continue to monitor    Lab Results   Component Value Date    HGBA1C 7 5 (H) 05/20/2023

## 2023-05-26 NOTE — ASSESSMENT & PLAN NOTE
Not well controlled  I am going to increase levothyroxine to 250 mcg daily  Was discussed with patient to make sure to take medication as prescribed  I will repeat in 3 months

## 2023-05-26 NOTE — ASSESSMENT & PLAN NOTE
Stable  Continue to follow low-fat diet and regular exercise  Continue atorvastatin 10 mg daily  We will continue to monitor

## 2023-05-30 ENCOUNTER — VBI (OUTPATIENT)
Dept: ADMINISTRATIVE | Facility: OTHER | Age: 48
End: 2023-05-30

## 2023-06-22 ENCOUNTER — VBI (OUTPATIENT)
Dept: ADMINISTRATIVE | Facility: OTHER | Age: 48
End: 2023-06-22

## 2023-07-25 PROBLEM — Z00.00 HEALTHCARE MAINTENANCE: Status: RESOLVED | Noted: 2023-05-26 | Resolved: 2023-07-25

## 2023-08-03 DIAGNOSIS — E78.2 MIXED HYPERLIPIDEMIA: ICD-10-CM

## 2023-08-04 RX ORDER — ATORVASTATIN CALCIUM 20 MG/1
20 TABLET, FILM COATED ORAL
Qty: 90 TABLET | Refills: 1 | Status: SHIPPED | OUTPATIENT
Start: 2023-08-04

## 2023-08-24 ENCOUNTER — RA CDI HCC (OUTPATIENT)
Dept: OTHER | Facility: HOSPITAL | Age: 48
End: 2023-08-24

## 2023-08-24 NOTE — PROGRESS NOTES
[facility] [address] [facility] coding opportunities       Chart reviewed, no opportunity found: CHART REVIEWED, NO OPPORTUNITY FOUND        Patients Insurance        Commercial Insurance: [employer] Hugo
yes

## 2023-08-26 ENCOUNTER — APPOINTMENT (OUTPATIENT)
Dept: LAB | Facility: CLINIC | Age: 48
End: 2023-08-26
Payer: COMMERCIAL

## 2023-08-26 DIAGNOSIS — E11.65 UNCONTROLLED TYPE 2 DIABETES MELLITUS WITH HYPERGLYCEMIA (HCC): ICD-10-CM

## 2023-08-26 LAB
ALBUMIN SERPL BCP-MCNC: 4.4 G/DL (ref 3.5–5)
ALP SERPL-CCNC: 64 U/L (ref 34–104)
ALT SERPL W P-5'-P-CCNC: 20 U/L (ref 7–52)
ANION GAP SERPL CALCULATED.3IONS-SCNC: 6 MMOL/L
AST SERPL W P-5'-P-CCNC: 13 U/L (ref 13–39)
BASOPHILS # BLD AUTO: 0.05 THOUSANDS/ÂΜL (ref 0–0.1)
BASOPHILS NFR BLD AUTO: 1 % (ref 0–1)
BILIRUB SERPL-MCNC: 1.04 MG/DL (ref 0.2–1)
BUN SERPL-MCNC: 19 MG/DL (ref 5–25)
CALCIUM SERPL-MCNC: 8.5 MG/DL (ref 8.4–10.2)
CHLORIDE SERPL-SCNC: 105 MMOL/L (ref 96–108)
CHOLEST SERPL-MCNC: 121 MG/DL
CO2 SERPL-SCNC: 25 MMOL/L (ref 21–32)
CREAT SERPL-MCNC: 0.77 MG/DL (ref 0.6–1.3)
EOSINOPHIL # BLD AUTO: 0.1 THOUSAND/ÂΜL (ref 0–0.61)
EOSINOPHIL NFR BLD AUTO: 2 % (ref 0–6)
ERYTHROCYTE [DISTWIDTH] IN BLOOD BY AUTOMATED COUNT: 12.9 % (ref 11.6–15.1)
EST. AVERAGE GLUCOSE BLD GHB EST-MCNC: 166 MG/DL
GFR SERPL CREATININE-BSD FRML MDRD: 106 ML/MIN/1.73SQ M
GLUCOSE P FAST SERPL-MCNC: 161 MG/DL (ref 65–99)
HBA1C MFR BLD: 7.4 %
HCT VFR BLD AUTO: 46.4 % (ref 36.5–49.3)
HDLC SERPL-MCNC: 33 MG/DL
HGB BLD-MCNC: 15.9 G/DL (ref 12–17)
IMM GRANULOCYTES # BLD AUTO: 0.04 THOUSAND/UL (ref 0–0.2)
IMM GRANULOCYTES NFR BLD AUTO: 1 % (ref 0–2)
LDLC SERPL CALC-MCNC: 71 MG/DL (ref 0–100)
LYMPHOCYTES # BLD AUTO: 0.98 THOUSANDS/ÂΜL (ref 0.6–4.47)
LYMPHOCYTES NFR BLD AUTO: 20 % (ref 14–44)
MCH RBC QN AUTO: 29.5 PG (ref 26.8–34.3)
MCHC RBC AUTO-ENTMCNC: 34.3 G/DL (ref 31.4–37.4)
MCV RBC AUTO: 86 FL (ref 82–98)
MONOCYTES # BLD AUTO: 0.35 THOUSAND/ÂΜL (ref 0.17–1.22)
MONOCYTES NFR BLD AUTO: 7 % (ref 4–12)
NEUTROPHILS # BLD AUTO: 3.49 THOUSANDS/ÂΜL (ref 1.85–7.62)
NEUTS SEG NFR BLD AUTO: 69 % (ref 43–75)
NRBC BLD AUTO-RTO: 0 /100 WBCS
PLATELET # BLD AUTO: 176 THOUSANDS/UL (ref 149–390)
PMV BLD AUTO: 9.3 FL (ref 8.9–12.7)
POTASSIUM SERPL-SCNC: 4.1 MMOL/L (ref 3.5–5.3)
PROT SERPL-MCNC: 7.1 G/DL (ref 6.4–8.4)
RBC # BLD AUTO: 5.39 MILLION/UL (ref 3.88–5.62)
SODIUM SERPL-SCNC: 136 MMOL/L (ref 135–147)
T4 FREE SERPL-MCNC: 1.09 NG/DL (ref 0.61–1.12)
TRIGL SERPL-MCNC: 84 MG/DL
TSH SERPL DL<=0.05 MIU/L-ACNC: 7.01 UIU/ML (ref 0.45–4.5)
WBC # BLD AUTO: 5.01 THOUSAND/UL (ref 4.31–10.16)

## 2023-08-26 PROCEDURE — 36415 COLL VENOUS BLD VENIPUNCTURE: CPT

## 2023-08-26 PROCEDURE — 83036 HEMOGLOBIN GLYCOSYLATED A1C: CPT

## 2023-08-26 PROCEDURE — 84439 ASSAY OF FREE THYROXINE: CPT

## 2023-08-26 PROCEDURE — 80053 COMPREHEN METABOLIC PANEL: CPT

## 2023-08-26 PROCEDURE — 84443 ASSAY THYROID STIM HORMONE: CPT

## 2023-08-26 PROCEDURE — 85025 COMPLETE CBC W/AUTO DIFF WBC: CPT

## 2023-08-26 PROCEDURE — 80061 LIPID PANEL: CPT

## 2023-09-01 ENCOUNTER — OFFICE VISIT (OUTPATIENT)
Dept: FAMILY MEDICINE CLINIC | Facility: CLINIC | Age: 48
End: 2023-09-01
Payer: COMMERCIAL

## 2023-09-01 VITALS
WEIGHT: 205 LBS | BODY MASS INDEX: 31.07 KG/M2 | HEIGHT: 68 IN | TEMPERATURE: 97.7 F | SYSTOLIC BLOOD PRESSURE: 124 MMHG | HEART RATE: 96 BPM | OXYGEN SATURATION: 97 % | DIASTOLIC BLOOD PRESSURE: 86 MMHG | RESPIRATION RATE: 16 BRPM

## 2023-09-01 DIAGNOSIS — I10 ESSENTIAL HYPERTENSION: ICD-10-CM

## 2023-09-01 DIAGNOSIS — N52.8 OTHER MALE ERECTILE DYSFUNCTION: ICD-10-CM

## 2023-09-01 DIAGNOSIS — E11.65 UNCONTROLLED TYPE 2 DIABETES MELLITUS WITH HYPERGLYCEMIA (HCC): Primary | ICD-10-CM

## 2023-09-01 DIAGNOSIS — E89.0 POSTOPERATIVE HYPOTHYROIDISM: ICD-10-CM

## 2023-09-01 DIAGNOSIS — E78.2 MIXED HYPERLIPIDEMIA: ICD-10-CM

## 2023-09-01 PROBLEM — Z79.4 TYPE 2 DIABETES MELLITUS WITH HYPERGLYCEMIA, WITH LONG-TERM CURRENT USE OF INSULIN (HCC): Chronic | Status: RESOLVED | Noted: 2019-04-15 | Resolved: 2023-09-01

## 2023-09-01 PROCEDURE — 99214 OFFICE O/P EST MOD 30 MIN: CPT | Performed by: FAMILY MEDICINE

## 2023-09-01 RX ORDER — LEVOTHYROXINE SODIUM 0.07 MG/1
75 TABLET ORAL DAILY
Qty: 90 TABLET | Refills: 1 | Status: SHIPPED | OUTPATIENT
Start: 2023-09-01

## 2023-09-01 RX ORDER — GLIPIZIDE 10 MG/1
10 TABLET, FILM COATED, EXTENDED RELEASE ORAL DAILY
Qty: 90 TABLET | Refills: 1 | Status: SHIPPED | OUTPATIENT
Start: 2023-09-01

## 2023-09-01 RX ORDER — SILDENAFIL 100 MG/1
100 TABLET, FILM COATED ORAL DAILY PRN
Qty: 30 TABLET | Refills: 1 | Status: SHIPPED | OUTPATIENT
Start: 2023-09-01

## 2023-09-01 NOTE — ASSESSMENT & PLAN NOTE
Improving. Continue to follow low-fat diet and regular exercise. Continue on atorvastatin 20 mg daily. Continue to monitor.

## 2023-09-01 NOTE — PROGRESS NOTES
Name: Minerva Rodriguez      : 1975      MRN: [id number]  Encounter Provider: Anand Herman MD  Encounter Date: 2023   Encounter department: [facility]     1. Uncontrolled type 2 diabetes mellitus with hyperglycemia ([address])  Assessment & Plan:  A1c is elevated but improved from before. I am going to increase glipizide to 10 mg daily. Discussed with possible side effect. Discussed about low-carb diet and regular exercise. We will continue to monitor. Repeat the blood work in 3 months. Lab Results   Component Value Date    HGBA1C 7.4 (H) 2023       Orders:  -     glipiZIDE (GLUCOTROL XL) 10 mg 24 hr tablet; Take 1 tablet (10 mg total) by mouth daily  -     Hemoglobin A1C; Future    2. Postoperative hypothyroidism  Assessment & Plan:  TSH is elevated but improved from before. I am going to increase her levothyroxine to 275 mcg daily. I will repeat the blood work in 3 months. Orders:  -     levothyroxine 75 mcg tablet; Take 1 tablet (75 mcg total) by mouth daily  -     TSH, 3rd generation with Free T4 reflex; Future    3. Other male erectile dysfunction  -     sildenafil (VIAGRA) 100 mg tablet; Take 1 tablet (100 mg total) by mouth daily as needed for erectile dysfunction    4. Essential hypertension  Assessment & Plan:  Well-controlled. Continue same. We will continue to monitor. 5. Mixed hyperlipidemia  Assessment & Plan:  Improving. Continue to follow low-fat diet and regular exercise. Continue on atorvastatin 20 mg daily. Continue to monitor. Subjective     Here today for follow-up multiple medical problems. He has been taking his medications. He denies any side effects. He had blood work done recently showed A1c slightly elevated but improved from before. His cholesterol showed triglyceride improved. His TSH is elevated but improved from before. He has been taking 250 mcg daily.     Review of Systems Constitutional: Negative for chills and fever. HENT: Negative for trouble swallowing. Eyes: Negative for visual disturbance. Respiratory: Negative for cough and shortness of breath. Cardiovascular: Negative for chest pain and palpitations. Gastrointestinal: Negative for abdominal pain, blood in stool and vomiting. Endocrine: Negative for cold intolerance and heat intolerance. Genitourinary: Negative for difficulty urinating and dysuria. Musculoskeletal: Negative for gait problem. Skin: Negative for rash. Neurological: Negative for dizziness, syncope and headaches. Hematological: Negative for adenopathy. Psychiatric/Behavioral: Negative for behavioral problems.        Past Medical History:   Diagnosis Date   • Cancer [facility])     Thyroid cancer   • Diabetes mellitus ([address] )    • Disease of thyroid gland    • Hyperlipidemia    • Hypertension    • Postoperative urinary retention    • Seizures (HCC)     x1 at age of 5     Past Surgical History:   Procedure Laterality Date   • NECK SURGERY  04/21/2017    "fatty tumor removal"   • [place] LAPAROSCOPY SURG CHOLECYSTECTOMY N/A 8/29/2022    Procedure: LAPAROSCOPIC CHOLECYSTECTOMY;  Surgeon: Natalio Rodriguez MD;  Location: [address] [place] [address] OR;  Service: General   • THYROIDECTOMY     • WISDOM TOOTH EXTRACTION       Family History   Problem Relation Age of Onset   • Diabetes type II Mother         MELLITUS   • COPD Father    • Cancer Father         MB 2/4/16    LYMPHOMA CANCER     Social History     Socioeconomic History   • Marital status: /Civil Union     Spouse name: None   • Number of children: None   • Years of education: None   • Highest education level: None   Occupational History   • None   Tobacco Use   • Smoking status: Never   • Smokeless tobacco: Never   Vaping Use   • Vaping Use: Never used   Substance and Sexual Activity   • Alcohol use: Yes     Comment: less than 1 drink per week   • Drug use: No   • Sexual activity: None   Other Topics Concern   • None   Social History Narrative   • None     Social Determinants of Health     Financial Resource Strain: Not on file   Food Insecurity: Not on file   Transportation Needs: Not on file   Physical Activity: Not on file   Stress: Not on file   Social Connections: Not on file   Intimate Partner Violence: Not on file   Housing Stability: Not on file     Current Outpatient Medications on File Prior to Visit   Medication Sig   • ascorbic acid (VITAMIN C) 1000 MG tablet Take 1 tablet (1,000 mg total) by mouth every 12 (twelve) hours for 10 doses   • atorvastatin (LIPITOR) 20 mg tablet TAKE 1 TABLET BY MOUTH EVERYDAY AT BEDTIME   • benazepril (LOTENSIN) 20 mg tablet Take 0.5 tablets (10 mg total) by mouth daily   • levothyroxine 200 mcg tablet Take 1 tablet (200 mcg total) by mouth daily   • Xigduo XR  MG TB24 TAKE 1 TABLET BY MOUTH EVERY DAY IN THE MORNING   • [DISCONTINUED] glipiZIDE (GLUCOTROL XL) 5 mg 24 hr tablet Take 1 tablet (5 mg total) by mouth daily   • [DISCONTINUED] levothyroxine 50 mcg tablet Take 1 tablet (50 mcg total) by mouth daily   • [DISCONTINUED] sildenafil (VIAGRA) 100 mg tablet Take 1 tablet (100 mg total) by mouth daily as needed for erectile dysfunction   • [DISCONTINUED] cholecalciferol (VITAMIN D3) 1,000 units tablet Take 2 tablets (2,000 Units total) by mouth daily (Patient not taking: Reported on 7/28/2022)   • [DISCONTINUED] clobetasol (TEMOVATE) 0.05 % cream APPLY UP TO TWICE DAILY TO SKIN OR ECZEMA (Patient not taking: Reported on 7/28/2022)   • [DISCONTINUED] Dapagliflozin Propanediol (Farxiga) 10 MG TABS Take 1 tablet (10 mg total) by mouth daily (Patient not taking: Reported on 9/13/2022)   • [DISCONTINUED] glucose blood test strip test blood sugar twice daily or prn (Patient not taking: Reported on 7/28/2022)   • [DISCONTINUED] hydrocortisone 1 % lotion APPLY AT BEDTIME (Patient not taking: Reported on 7/28/2022)   • [DISCONTINUED] Insulin Degludec-Liraglutide (Insulin Degludec-Liraglutide) 100 units-3.6 mg/mL injection pen inject (16UNITS) every morning; may increase by 2u every 3 days until B is below 120 in am (Patient not taking: Reported on 7/28/2022)   • [DISCONTINUED] Insulin Pen Needle (PEN NEEDLES) 32G X 4 MM MISC inject once daily as directed (Patient not taking: Reported on 7/28/2022)   • [DISCONTINUED] multivitamin-minerals (CENTRUM ADULTS) tablet Take 1 tablet by mouth daily (Patient not taking: Reported on 7/28/2022)   • [DISCONTINUED] naproxen (Naprosyn) 500 mg tablet Take 1 tablet (500 mg total) by mouth 2 (two) times a day with meals (Patient not taking: Reported on 5/26/2023)   • [DISCONTINUED] zinc sulfate (ZINCATE) 220 mg capsule Take 1 capsule (220 mg total) by mouth daily for 5 doses (Patient not taking: Reported on 9/1/2023)     No Known Allergies  Immunization History   Administered Date(s) Administered   • COVID-19 Moderna Vac BIVALENT 12 Yr+ IM (BOOSTER ONLY) 0.5 ML 10/23/2022   • COVID-19 PFIZER VACCINE 0.3 ML IM 05/15/2021, 06/05/2021, 12/19/2021   • INFLUENZA 10/21/2020, 10/15/2021, 09/08/2022   • Influenza, injectable, quadrivalent, preservative free 0.5 mL 09/30/2019, 10/21/2020   • Td (adult), adsorbed 12/29/2010   • Tdap 08/11/2021       Objective     /86 (BP Location: Left arm, Patient Position: Sitting, Cuff Size: Adult)   Pulse 96   Temp 97.7 °F (36.5 °C) (Tympanic)   Resp 16   Ht 5' 8" (1.727 m)   Wt 93 kg (205 lb)   SpO2 97%   BMI 31.17 kg/m²     Physical Exam  Vitals and nursing note reviewed. Constitutional:       Appearance: He is well-developed. HENT:      Head: Normocephalic and atraumatic. Eyes:      Pupils: Pupils are equal, round, and reactive to light. Cardiovascular:      Rate and Rhythm: Normal rate and regular rhythm. Heart sounds: Normal heart sounds. Pulmonary:      Effort: Pulmonary effort is normal.      Breath sounds: Normal breath sounds.    Abdominal:      General: Bowel sounds are normal. Palpations: Abdomen is soft. Musculoskeletal:         General: Normal range of motion. Cervical back: Normal range of motion and neck supple. Lymphadenopathy:      Cervical: No cervical adenopathy. Skin:     General: Skin is warm. Findings: No rash. Neurological:      Mental Status: He is alert and oriented to person, place, and time. Cranial Nerves: No cranial nerve deficit.        Raul Nichole MD

## 2023-09-01 NOTE — ASSESSMENT & PLAN NOTE
TSH is elevated but improved from before. I am going to increase her levothyroxine to 275 mcg daily. I will repeat the blood work in 3 months.

## 2023-09-01 NOTE — ASSESSMENT & PLAN NOTE
A1c is elevated but improved from before. I am going to increase glipizide to 10 mg daily. Discussed with possible side effect. Discussed about low-carb diet and regular exercise. We will continue to monitor. Repeat the blood work in 3 months.   Lab Results   Component Value Date    HGBA1C 7.4 (H) 08/26/2023

## 2023-09-01 NOTE — PROGRESS NOTES
Diabetic Foot Exam    Patient's shoes and socks removed. Right Foot/Ankle   Right Foot Inspection  Skin Exam: skin normal and skin intact. No dry skin, no warmth, no callus, no erythema, no maceration, no abnormal color, no pre-ulcer, no ulcer and no callus. Toe Exam: ROM and strength within normal limits. Sensory   Monofilament testing: intact    Vascular  Capillary refills: < 3 seconds  The right DP pulse is 2+. Left Foot/Ankle  Left Foot Inspection  Skin Exam: skin normal and skin intact. No dry skin, no warmth, no erythema, no maceration, normal color, no pre-ulcer, no ulcer and no callus. Toe Exam: ROM and strength within normal limits. Sensory   Monofilament testing: intact    Vascular  Capillary refills: < 3 seconds  The left DP pulse is 2+.      Assign Risk Category  No deformity present  No loss of protective sensation  No weak pulses  Risk: 0

## 2023-10-06 DIAGNOSIS — I10 ESSENTIAL HYPERTENSION: ICD-10-CM

## 2023-10-06 RX ORDER — BENAZEPRIL HYDROCHLORIDE 20 MG/1
10 TABLET ORAL DAILY
Qty: 45 TABLET | Refills: 3 | Status: SHIPPED | OUTPATIENT
Start: 2023-10-06

## 2023-11-27 ENCOUNTER — APPOINTMENT (OUTPATIENT)
Dept: LAB | Facility: CLINIC | Age: 48
End: 2023-11-27
Payer: COMMERCIAL

## 2023-11-27 ENCOUNTER — VBI (OUTPATIENT)
Dept: ADMINISTRATIVE | Facility: OTHER | Age: 48
End: 2023-11-27

## 2023-11-27 DIAGNOSIS — E11.65 UNCONTROLLED TYPE 2 DIABETES MELLITUS WITH HYPERGLYCEMIA (HCC): ICD-10-CM

## 2023-11-27 DIAGNOSIS — E89.0 POSTOPERATIVE HYPOTHYROIDISM: ICD-10-CM

## 2023-11-27 LAB
EST. AVERAGE GLUCOSE BLD GHB EST-MCNC: 157 MG/DL
HBA1C MFR BLD: 7.1 %
T4 FREE SERPL-MCNC: 1.3 NG/DL (ref 0.61–1.12)
TSH SERPL DL<=0.05 MIU/L-ACNC: 0.19 UIU/ML (ref 0.45–4.5)

## 2023-11-27 PROCEDURE — 84443 ASSAY THYROID STIM HORMONE: CPT

## 2023-11-27 PROCEDURE — 84439 ASSAY OF FREE THYROXINE: CPT

## 2023-11-27 PROCEDURE — 36415 COLL VENOUS BLD VENIPUNCTURE: CPT

## 2023-11-27 PROCEDURE — 83036 HEMOGLOBIN GLYCOSYLATED A1C: CPT

## 2023-12-01 ENCOUNTER — OFFICE VISIT (OUTPATIENT)
Dept: FAMILY MEDICINE CLINIC | Facility: CLINIC | Age: 48
End: 2023-12-01
Payer: COMMERCIAL

## 2023-12-01 VITALS
HEART RATE: 98 BPM | TEMPERATURE: 97.3 F | DIASTOLIC BLOOD PRESSURE: 72 MMHG | BODY MASS INDEX: 31.22 KG/M2 | WEIGHT: 206 LBS | RESPIRATION RATE: 16 BRPM | HEIGHT: 68 IN | OXYGEN SATURATION: 97 % | SYSTOLIC BLOOD PRESSURE: 118 MMHG

## 2023-12-01 DIAGNOSIS — N52.8 OTHER MALE ERECTILE DYSFUNCTION: ICD-10-CM

## 2023-12-01 DIAGNOSIS — E11.65 UNCONTROLLED TYPE 2 DIABETES MELLITUS WITH HYPERGLYCEMIA (HCC): ICD-10-CM

## 2023-12-01 DIAGNOSIS — E89.0 POSTOPERATIVE HYPOTHYROIDISM: Primary | ICD-10-CM

## 2023-12-01 DIAGNOSIS — I10 ESSENTIAL HYPERTENSION: ICD-10-CM

## 2023-12-01 DIAGNOSIS — E78.2 MIXED HYPERLIPIDEMIA: ICD-10-CM

## 2023-12-01 PROCEDURE — 99214 OFFICE O/P EST MOD 30 MIN: CPT | Performed by: FAMILY MEDICINE

## 2023-12-01 RX ORDER — LEVOTHYROXINE SODIUM 0.2 MG/1
200 TABLET ORAL DAILY
Qty: 90 TABLET | Refills: 1 | Status: SHIPPED | OUTPATIENT
Start: 2023-12-01

## 2023-12-01 RX ORDER — ATORVASTATIN CALCIUM 20 MG/1
20 TABLET, FILM COATED ORAL
Qty: 90 TABLET | Refills: 1 | Status: SHIPPED | OUTPATIENT
Start: 2023-12-01

## 2023-12-01 RX ORDER — GLIPIZIDE 10 MG/1
10 TABLET, FILM COATED, EXTENDED RELEASE ORAL DAILY
Qty: 90 TABLET | Refills: 1 | Status: SHIPPED | OUTPATIENT
Start: 2023-12-01

## 2023-12-01 RX ORDER — BENAZEPRIL HYDROCHLORIDE 20 MG/1
10 TABLET ORAL DAILY
Qty: 45 TABLET | Refills: 3 | Status: SHIPPED | OUTPATIENT
Start: 2023-12-01

## 2023-12-01 RX ORDER — DAPAGLIFLOZIN AND METFORMIN HYDROCHLORIDE 10; 1000 MG/1; MG/1
1 TABLET, FILM COATED, EXTENDED RELEASE ORAL EVERY MORNING
Qty: 30 TABLET | Refills: 5 | Status: SHIPPED | OUTPATIENT
Start: 2023-12-01

## 2023-12-01 RX ORDER — SILDENAFIL 100 MG/1
100 TABLET, FILM COATED ORAL DAILY PRN
Qty: 30 TABLET | Refills: 1 | Status: SHIPPED | OUTPATIENT
Start: 2023-12-01

## 2023-12-01 RX ORDER — LEVOTHYROXINE SODIUM 0.05 MG/1
50 TABLET ORAL DAILY
Qty: 90 TABLET | Refills: 1 | Status: SHIPPED | OUTPATIENT
Start: 2023-12-01

## 2023-12-01 NOTE — ASSESSMENT & PLAN NOTE
TSH 0.187, T4 1.3. I am going to decrease his levothyroxine to 250 mcg daily. I will repeat his blood work in 2 to 3 months.

## 2023-12-01 NOTE — PROGRESS NOTES
Name: Corie Blizzard      : 1975      MRN: [id number]  Encounter Provider: Ro Fernandez MD  Encounter Date: 2023   Encounter department: [facility]     1. Postoperative hypothyroidism  Assessment & Plan:  TSH 0.187, T4 1.3. I am going to decrease his levothyroxine to 250 mcg daily. I will repeat his blood work in 2 to 3 months. Orders:  -     levothyroxine 50 mcg tablet; Take 1 tablet (50 mcg total) by mouth daily  -     levothyroxine 200 mcg tablet; Take 1 tablet (200 mcg total) by mouth daily    2. Uncontrolled type 2 diabetes mellitus with hyperglycemia (HCC)  Assessment & Plan:  A1c is elevated but improving. Continue medications as prescribed. Will add tirzepatide 2.5 mg/0.5 mL. Discussed importance of low carbohydrate diet and regular exercise. Will continue to monitor. Lab Results   Component Value Date    HGBA1C 7.1 (H) 2023       Orders:  -     tirzepatide 2.5 MG/0.5ML; Inject 0.5 mL (2.5 mg total) under the skin every 7 days  -     Dapagliflozin Pro-metFORMIN ER (Xigduo XR)  MG TB24; Take 1 tablet by mouth every morning  -     glipiZIDE (GLUCOTROL XL) 10 mg 24 hr tablet; Take 1 tablet (10 mg total) by mouth daily  -     Albumin / creatinine urine ratio; Future; Expected date: 2023  -     Comprehensive metabolic panel; Future; Expected date: 2023  -     Hemoglobin A1C; Future; Expected date: 2023  -     CBC and differential; Future; Expected date: 2023  -     Lipid Panel with Direct LDL reflex; Future; Expected date: 2023  -     TSH, 3rd generation with Free T4 reflex; Future; Expected date: 2023    3. Essential hypertension  Assessment & Plan:  /72. Well controlled on Lotensin 20 mg. Continue medication as prescribed. Will continue to monitor. Orders:  -     benazepril (LOTENSIN) 20 mg tablet; Take 0.5 tablets (10 mg total) by mouth daily    4.  Mixed hyperlipidemia  Assessment & Plan:  Stable on atorvastatin. Continue same and continue to follow with low-fat diet and regular exercise. Orders:  -     atorvastatin (LIPITOR) 20 mg tablet; Take 1 tablet (20 mg total) by mouth daily at bedtime    5. Other male erectile dysfunction  Assessment & Plan:  He was given prescription for Viagra. Orders:  -     sildenafil (VIAGRA) 100 mg tablet; Take 1 tablet (100 mg total) by mouth daily as needed for erectile dysfunction           Karey Self is 50 yo male with a PMH of HTN, Type 2 DM, postop hypothyroidism, presenting to the office for a 3 mo follow up visit. Blood work drawn on 11/27/23 shows A1c of 7.1, TSH of 0.187, and T4 of 1.3. Discussed importance of low carbohydrate diet and regular exercise. Patient reports no other concerns today. Review of Systems   Constitutional:  Negative for chills, diaphoresis, fatigue and fever. HENT:  Negative for drooling, facial swelling, hearing loss and trouble swallowing. Eyes:  Negative for visual disturbance. Respiratory:  Negative for shortness of breath. Cardiovascular:  Negative for chest pain, palpitations and leg swelling. Gastrointestinal:  Negative for abdominal pain, constipation, diarrhea, nausea and vomiting. Endocrine: Negative for polydipsia and polyuria. Genitourinary:  Negative for difficulty urinating and dysuria. Musculoskeletal:  Negative for arthralgias and myalgias. Neurological:  Negative for dizziness, weakness, light-headedness, numbness and headaches.        Current Outpatient Medications on File Prior to Visit   Medication Sig   • [DISCONTINUED] atorvastatin (LIPITOR) 20 mg tablet TAKE 1 TABLET BY MOUTH EVERYDAY AT BEDTIME   • [DISCONTINUED] benazepril (LOTENSIN) 20 mg tablet TAKE 1/2 TABLET BY MOUTH DAILY   • [DISCONTINUED] glipiZIDE (GLUCOTROL XL) 10 mg 24 hr tablet Take 1 tablet (10 mg total) by mouth daily   • [DISCONTINUED] levothyroxine 200 mcg tablet Take 1 tablet (200 mcg total) by mouth daily   • [DISCONTINUED] sildenafil (VIAGRA) 100 mg tablet Take 1 tablet (100 mg total) by mouth daily as needed for erectile dysfunction   • [DISCONTINUED] Xigduo XR  MG TB24 TAKE 1 TABLET BY MOUTH EVERY DAY IN THE MORNING   • ascorbic acid (VITAMIN C) 1000 MG tablet Take 1 tablet (1,000 mg total) by mouth every 12 (twelve) hours for 10 doses (Patient not taking: Reported on 12/1/2023)   • [DISCONTINUED] levothyroxine 75 mcg tablet Take 1 tablet (75 mcg total) by mouth daily       Objective     /72 (BP Location: Left arm, Patient Position: Sitting, Cuff Size: Large)   Pulse 98   Temp (!) 97.3 °F (36.3 °C) (Tympanic)   Resp 16   Ht 5' 8" (1.727 m)   Wt 93.4 kg (206 lb)   SpO2 97%   BMI 31.32 kg/m²     Physical Exam  Vitals and nursing note reviewed. Constitutional:       Appearance: Normal appearance. HENT:      Head: Normocephalic and atraumatic. Right Ear: Tympanic membrane normal.      Left Ear: Tympanic membrane normal.      Mouth/Throat:      Mouth: Mucous membranes are moist.   Eyes:      Pupils: Pupils are equal, round, and reactive to light. Cardiovascular:      Rate and Rhythm: Normal rate and regular rhythm. Pulses: Normal pulses. Heart sounds: Normal heart sounds. Pulmonary:      Effort: Pulmonary effort is normal.      Breath sounds: Normal breath sounds. Abdominal:      Palpations: Abdomen is soft. Skin:     General: Skin is warm. Capillary Refill: Capillary refill takes less than 2 seconds. Neurological:      Mental Status: He is alert and oriented to person, place, and time.        Juan Gamez MD

## 2023-12-01 NOTE — ASSESSMENT & PLAN NOTE
A1c is elevated but improving. Continue medications as prescribed. Will add tirzepatide 2.5 mg/0.5 mL. Discussed importance of low carbohydrate diet and regular exercise. Will continue to monitor.   Lab Results   Component Value Date    HGBA1C 7.1 (H) 11/27/2023

## 2023-12-01 NOTE — ASSESSMENT & PLAN NOTE
Stable on atorvastatin. Continue same and continue to follow with low-fat diet and regular exercise.

## 2023-12-01 NOTE — ASSESSMENT & PLAN NOTE
/72. Well controlled on Lotensin 20 mg. Continue medication as prescribed. Will continue to monitor.

## 2023-12-24 DIAGNOSIS — E11.65 UNCONTROLLED TYPE 2 DIABETES MELLITUS WITH HYPERGLYCEMIA (HCC): ICD-10-CM

## 2023-12-26 RX ORDER — TIRZEPATIDE 2.5 MG/.5ML
INJECTION, SOLUTION SUBCUTANEOUS
Qty: 6 ML | Refills: 1 | Status: SHIPPED | OUTPATIENT
Start: 2023-12-26

## 2023-12-26 NOTE — TELEPHONE ENCOUNTER
Pharmacy requesting 90 day supply on [facility]   Last seen 12/1/23  Medication sent to pharmacy

## 2024-01-29 ENCOUNTER — APPOINTMENT (OUTPATIENT)
Dept: LAB | Facility: CLINIC | Age: 49
End: 2024-01-29
Payer: COMMERCIAL

## 2024-01-29 DIAGNOSIS — E11.65 UNCONTROLLED TYPE 2 DIABETES MELLITUS WITH HYPERGLYCEMIA (HCC): ICD-10-CM

## 2024-01-29 LAB
ALBUMIN SERPL BCP-MCNC: 4.4 G/DL (ref 3.5–5)
ALP SERPL-CCNC: 73 U/L (ref 34–104)
ALT SERPL W P-5'-P-CCNC: 21 U/L (ref 7–52)
ANION GAP SERPL CALCULATED.3IONS-SCNC: 5 MMOL/L
AST SERPL W P-5'-P-CCNC: 12 U/L (ref 13–39)
BASOPHILS # BLD AUTO: 0.07 THOUSANDS/ÂΜL (ref 0–0.1)
BASOPHILS NFR BLD AUTO: 1 % (ref 0–1)
BILIRUB SERPL-MCNC: 0.95 MG/DL (ref 0.2–1)
BUN SERPL-MCNC: 15 MG/DL (ref 5–25)
CALCIUM SERPL-MCNC: 8.7 MG/DL (ref 8.4–10.2)
CHLORIDE SERPL-SCNC: 104 MMOL/L (ref 96–108)
CHOLEST SERPL-MCNC: 126 MG/DL
CO2 SERPL-SCNC: 29 MMOL/L (ref 21–32)
CREAT SERPL-MCNC: 0.93 MG/DL (ref 0.6–1.3)
CREAT UR-MCNC: 101.6 MG/DL
EOSINOPHIL # BLD AUTO: 0.11 THOUSAND/ÂΜL (ref 0–0.61)
EOSINOPHIL NFR BLD AUTO: 2 % (ref 0–6)
ERYTHROCYTE [DISTWIDTH] IN BLOOD BY AUTOMATED COUNT: 12.9 % (ref 11.6–15.1)
EST. AVERAGE GLUCOSE BLD GHB EST-MCNC: 189 MG/DL
GFR SERPL CREATININE-BSD FRML MDRD: 96 ML/MIN/1.73SQ M
GLUCOSE P FAST SERPL-MCNC: 222 MG/DL (ref 65–99)
HBA1C MFR BLD: 8.2 %
HCT VFR BLD AUTO: 49.2 % (ref 36.5–49.3)
HDLC SERPL-MCNC: 35 MG/DL
HGB BLD-MCNC: 16.8 G/DL (ref 12–17)
IMM GRANULOCYTES # BLD AUTO: 0.04 THOUSAND/UL (ref 0–0.2)
IMM GRANULOCYTES NFR BLD AUTO: 1 % (ref 0–2)
LDLC SERPL CALC-MCNC: 73 MG/DL (ref 0–100)
LYMPHOCYTES # BLD AUTO: 1.55 THOUSANDS/ÂΜL (ref 0.6–4.47)
LYMPHOCYTES NFR BLD AUTO: 27 % (ref 14–44)
MCH RBC QN AUTO: 29.3 PG (ref 26.8–34.3)
MCHC RBC AUTO-ENTMCNC: 34.1 G/DL (ref 31.4–37.4)
MCV RBC AUTO: 86 FL (ref 82–98)
MICROALBUMIN UR-MCNC: <7 MG/L
MICROALBUMIN/CREAT 24H UR: <7 MG/G CREATININE (ref 0–30)
MONOCYTES # BLD AUTO: 0.53 THOUSAND/ÂΜL (ref 0.17–1.22)
MONOCYTES NFR BLD AUTO: 9 % (ref 4–12)
NEUTROPHILS # BLD AUTO: 3.36 THOUSANDS/ÂΜL (ref 1.85–7.62)
NEUTS SEG NFR BLD AUTO: 60 % (ref 43–75)
NRBC BLD AUTO-RTO: 0 /100 WBCS
PLATELET # BLD AUTO: 171 THOUSANDS/UL (ref 149–390)
PMV BLD AUTO: 9.9 FL (ref 8.9–12.7)
POTASSIUM SERPL-SCNC: 4 MMOL/L (ref 3.5–5.3)
PROT SERPL-MCNC: 7.4 G/DL (ref 6.4–8.4)
RBC # BLD AUTO: 5.74 MILLION/UL (ref 3.88–5.62)
SODIUM SERPL-SCNC: 138 MMOL/L (ref 135–147)
TRIGL SERPL-MCNC: 88 MG/DL
TSH SERPL DL<=0.05 MIU/L-ACNC: 3.81 UIU/ML (ref 0.45–4.5)
WBC # BLD AUTO: 5.66 THOUSAND/UL (ref 4.31–10.16)

## 2024-01-29 PROCEDURE — 80053 COMPREHEN METABOLIC PANEL: CPT

## 2024-01-29 PROCEDURE — 82043 UR ALBUMIN QUANTITATIVE: CPT

## 2024-01-29 PROCEDURE — 85025 COMPLETE CBC W/AUTO DIFF WBC: CPT

## 2024-01-29 PROCEDURE — 36415 COLL VENOUS BLD VENIPUNCTURE: CPT

## 2024-01-29 PROCEDURE — 80061 LIPID PANEL: CPT

## 2024-01-29 PROCEDURE — 3052F HG A1C>EQUAL 8.0%<EQUAL 9.0%: CPT | Performed by: FAMILY MEDICINE

## 2024-01-29 PROCEDURE — 83036 HEMOGLOBIN GLYCOSYLATED A1C: CPT

## 2024-01-29 PROCEDURE — 82570 ASSAY OF URINE CREATININE: CPT

## 2024-01-29 PROCEDURE — 84443 ASSAY THYROID STIM HORMONE: CPT

## 2024-02-29 ENCOUNTER — RA CDI HCC (OUTPATIENT)
Dept: OTHER | Facility: HOSPITAL | Age: 49
End: 2024-02-29

## 2024-03-01 ENCOUNTER — OFFICE VISIT (OUTPATIENT)
Dept: FAMILY MEDICINE CLINIC | Facility: CLINIC | Age: 49
End: 2024-03-01
Payer: COMMERCIAL

## 2024-03-01 VITALS
RESPIRATION RATE: 16 BRPM | HEIGHT: 68 IN | TEMPERATURE: 97.5 F | DIASTOLIC BLOOD PRESSURE: 84 MMHG | WEIGHT: 204 LBS | SYSTOLIC BLOOD PRESSURE: 132 MMHG | HEART RATE: 101 BPM | OXYGEN SATURATION: 97 % | BODY MASS INDEX: 30.92 KG/M2

## 2024-03-01 DIAGNOSIS — E11.65 UNCONTROLLED TYPE 2 DIABETES MELLITUS WITH HYPERGLYCEMIA (HCC): Primary | ICD-10-CM

## 2024-03-01 DIAGNOSIS — E78.2 MIXED HYPERLIPIDEMIA: ICD-10-CM

## 2024-03-01 DIAGNOSIS — E89.0 POSTOPERATIVE HYPOTHYROIDISM: ICD-10-CM

## 2024-03-01 DIAGNOSIS — C73 MALIGNANT NEOPLASM OF THYROID GLAND (HCC): ICD-10-CM

## 2024-03-01 DIAGNOSIS — I10 ESSENTIAL HYPERTENSION: ICD-10-CM

## 2024-03-01 PROCEDURE — 99214 OFFICE O/P EST MOD 30 MIN: CPT | Performed by: FAMILY MEDICINE

## 2024-03-01 NOTE — ASSESSMENT & PLAN NOTE
Not well-controlled.  Discussed about low-carb diet and regular exercise.  He is to continue on his Xigduo and glipizide.  He was given samples of Tradjenta.  Discussed with possible side effects.  Come back in 3 months for follow-up.  Lab Results   Component Value Date    HGBA1C 8.2 (H) 01/29/2024      hydrocortisone request from pharmacy

## 2024-03-01 NOTE — PROGRESS NOTES
Name: Stefano Hassan      : 1975      MRN: [id number]  Encounter Provider: Marcus Castano MD  Encounter Date: 3/1/2024   [facility]    Assessment & Plan     1. Uncontrolled type 2 diabetes mellitus with hyperglycemia (HCC)  Assessment & Plan:  Not well-controlled.  Discussed about low-carb diet and regular exercise.  He is to continue on his Xigduo and glipizide.  He was given samples of Tradjenta.  Discussed with possible side effects.  Come back in 3 months for follow-up.  Lab Results   Component Value Date    HGBA1C 8.2 (H) 2024     Orders:  -     Albumin / creatinine urine ratio; Future; Expected date: 2024  -     Comprehensive metabolic panel; Future; Expected date: 2024  -     Hemoglobin A1C; Future; Expected date: 2024  -     CBC and differential; Future; Expected date: 2024  -     Lipid Panel with Direct LDL reflex; Future; Expected date: 2024  -     TSH, 3rd generation with Free T4 reflex; Future; Expected date: 2024    2. Postoperative hypothyroidism  Assessment & Plan:  TSH is therapeutic.  Continue on levothyroxine 250 mcg daily.  Continue to monitor.      3. Malignant neoplasm of thyroid gland (HCC)  Assessment & Plan:  Stable.  Continue thyroid replacement treatment.      4. Essential hypertension  Assessment & Plan:  Well controlled on Lotensin 20 mg. Continue medication as prescribed. Will continue to monitor.      5. Mixed hyperlipidemia  Assessment & Plan:  Stable on atorvastatin.  Continue same and continue to follow with low-fat diet and regular exercise.      6. BMI 31.0-31.9,adult           Subjective     Is here today for follow-up multiple medical problems.  He has been taking his medications.  Denies any side effects to his medication.  Had blood work done recently showed elevated A1c at 8.2.  He stated he did not continue on Mounjaro due to the cost.  He claimed that he has been taking  "Glucotrol and he was agreeable every day.  He does not monitor his sugar at home.  His TSH came back therapeutic.  He denies any complaint.  He stated he has not been watching his diet for the last few days since his wife is in the hospital.    Hyperlipidemia  Pertinent negatives include no chest pain or shortness of breath.   Diabetes  Pertinent negatives for hypoglycemia include no dizziness or headaches. Pertinent negatives for diabetes include no chest pain.     Review of Systems   Constitutional:  Negative for chills and fever.   HENT:  Negative for trouble swallowing.    Eyes:  Negative for visual disturbance.   Respiratory:  Negative for cough and shortness of breath.    Cardiovascular:  Negative for chest pain and palpitations.   Gastrointestinal:  Negative for abdominal pain, blood in stool and vomiting.   Endocrine: Negative for cold intolerance and heat intolerance.   Genitourinary:  Negative for difficulty urinating and dysuria.   Musculoskeletal:  Negative for gait problem.   Skin:  Negative for rash.   Neurological:  Negative for dizziness, syncope and headaches.   Hematological:  Negative for adenopathy.   Psychiatric/Behavioral:  Negative for behavioral problems.        Past Medical History:   Diagnosis Date   • Cancer (HCC)     Thyroid cancer   • Diabetes mellitus (HCC)    • Disease of thyroid gland    • Hyperlipidemia    • Hypertension    • Postoperative urinary retention    • Seizures (HCC)     x1 at age of 7     Past Surgical History:   Procedure Laterality Date   • NECK SURGERY  04/21/2017    \"fatty tumor removal\"   • [place] LAPAROSCOPY SURG CHOLECYSTECTOMY N/A 8/29/2022    Procedure: LAPAROSCOPIC CHOLECYSTECTOMY;  Surgeon: Dino Castellon MD;  Location:  MAIN OR;  Service: General   • THYROIDECTOMY     • WISDOM TOOTH EXTRACTION       Family History   Problem Relation Age of Onset   • Diabetes type II Mother         MELLITUS   • COPD Father    • Cancer Father         MB 2/4/16    LYMPHOMA CANCER "     Social History     Socioeconomic History   • Marital status: /Civil Union     Spouse name: None   • Number of children: None   • Years of education: None   • Highest education level: None   Occupational History   • None   Tobacco Use   • Smoking status: Never   • Smokeless tobacco: Never   Vaping Use   • Vaping status: Never Used   Substance and Sexual Activity   • Alcohol use: Yes     Comment: less than 1 drink per week   • Drug use: No   • Sexual activity: None   Other Topics Concern   • None   Social History Narrative   • None     Social Determinants of Health     Financial Resource Strain: Not on file   Food Insecurity: Not on file   Transportation Needs: Not on file   Physical Activity: Not on file   Stress: Not on file   Social Connections: Not on file   Intimate Partner Violence: Low Risk  (11/23/2021)    Received from [facility]    Intimate Partner Violence    • Insults You: Not on file    • Threatens You: Not on file    • Screams at You: Not on file    • Physically Hurt: Not on file    • Intimate Partner Violence Score: Not on file   Housing Stability: Not on file     Current Outpatient Medications on File Prior to Visit   Medication Sig   • atorvastatin (LIPITOR) 20 mg tablet Take 1 tablet (20 mg total) by mouth daily at bedtime   • benazepril (LOTENSIN) 20 mg tablet Take 0.5 tablets (10 mg total) by mouth daily   • Dapagliflozin Pro-metFORMIN ER (Xigduo XR)  MG TB24 Take 1 tablet by mouth every morning   • glipiZIDE (GLUCOTROL XL) 10 mg 24 hr tablet Take 1 tablet (10 mg total) by mouth daily   • levothyroxine 200 mcg tablet Take 1 tablet (200 mcg total) by mouth daily   • levothyroxine 50 mcg tablet Take 1 tablet (50 mcg total) by mouth daily   • sildenafil (VIAGRA) 100 mg tablet Take 1 tablet (100 mg total) by mouth daily as needed for erectile dysfunction   • [DISCONTINUED] tirzepatide (Mounjaro) 2.5 MG/0.5ML INJECT 0.5 ML (2.5 MG TOTAL) UNDER THE SKIN EVERY 7 DAYS   • ascorbic  "acid (VITAMIN C) 1000 MG tablet Take 1 tablet (1,000 mg total) by mouth every 12 (twelve) hours for 10 doses (Patient not taking: Reported on 12/1/2023)     No Known Allergies  Immunization History   Administered Date(s) Administered   • COVID-19 Moderna Vac BIVALENT 12 Yr+ IM 0.5 ML 10/23/2022   • COVID-19 PFIZER VACCINE 0.3 ML IM 05/15/2021, 06/05/2021, 12/19/2021   • INFLUENZA 10/21/2020, 10/15/2021, 09/08/2022, 10/08/2023   • Influenza, injectable, quadrivalent, preservative free 0.5 mL 09/30/2019, 10/21/2020   • Td (adult), adsorbed 12/29/2010   • Tdap 08/11/2021       Objective     /84 (BP Location: Left arm, Patient Position: Sitting, Cuff Size: Adult)   Pulse 101   Temp 97.5 °F (36.4 °C) (Tympanic)   Resp 16   Ht 5' 8\" (1.727 m)   Wt 92.5 kg (204 lb)   SpO2 97%   BMI 31.02 kg/m²     Physical Exam  Vitals and nursing note reviewed.   Constitutional:       Appearance: He is well-developed.   HENT:      Head: Normocephalic and atraumatic.   Eyes:      Pupils: Pupils are equal, round, and reactive to light.   Cardiovascular:      Rate and Rhythm: Normal rate and regular rhythm.      Heart sounds: Normal heart sounds.   Pulmonary:      Effort: Pulmonary effort is normal.      Breath sounds: Normal breath sounds.   Abdominal:      General: Bowel sounds are normal.      Palpations: Abdomen is soft.   Musculoskeletal:      Cervical back: Normal range of motion and neck supple.   Lymphadenopathy:      Cervical: No cervical adenopathy.   Skin:     General: Skin is warm.      Findings: No rash.   Neurological:      Mental Status: He is alert and oriented to person, place, and time.       Marcus Castano MD  BMI Counseling: Body mass index is 31.02 kg/m². The BMI is above normal. Nutrition recommendations include reducing portion sizes, decreasing overall calorie intake, and 3-5 servings of fruits/vegetables daily. Exercise recommendations include moderate aerobic physical activity for 150 minutes/week.  "

## 2024-05-03 ENCOUNTER — VBI (OUTPATIENT)
Dept: ADMINISTRATIVE | Facility: OTHER | Age: 49
End: 2024-05-03

## 2024-05-26 DIAGNOSIS — E89.0 POSTOPERATIVE HYPOTHYROIDISM: ICD-10-CM

## 2024-05-26 DIAGNOSIS — E11.65 UNCONTROLLED TYPE 2 DIABETES MELLITUS WITH HYPERGLYCEMIA (HCC): ICD-10-CM

## 2024-05-27 RX ORDER — LEVOTHYROXINE SODIUM 0.05 MG/1
50 TABLET ORAL DAILY
Qty: 90 TABLET | Refills: 1 | Status: SHIPPED | OUTPATIENT
Start: 2024-05-27

## 2024-05-27 RX ORDER — GLIPIZIDE 10 MG/1
10 TABLET, FILM COATED, EXTENDED RELEASE ORAL DAILY
Qty: 90 TABLET | Refills: 1 | Status: SHIPPED | OUTPATIENT
Start: 2024-05-27

## 2024-06-01 ENCOUNTER — APPOINTMENT (OUTPATIENT)
Dept: LAB | Facility: CLINIC | Age: 49
End: 2024-06-01
Payer: COMMERCIAL

## 2024-06-01 DIAGNOSIS — E11.65 UNCONTROLLED TYPE 2 DIABETES MELLITUS WITH HYPERGLYCEMIA (HCC): ICD-10-CM

## 2024-06-01 LAB
ALBUMIN SERPL BCP-MCNC: 4.3 G/DL (ref 3.5–5)
ALP SERPL-CCNC: 69 U/L (ref 34–104)
ALT SERPL W P-5'-P-CCNC: 18 U/L (ref 7–52)
ANION GAP SERPL CALCULATED.3IONS-SCNC: 6 MMOL/L (ref 4–13)
AST SERPL W P-5'-P-CCNC: 14 U/L (ref 13–39)
BASOPHILS # BLD AUTO: 0.06 THOUSANDS/ÂΜL (ref 0–0.1)
BASOPHILS NFR BLD AUTO: 1 % (ref 0–1)
BILIRUB SERPL-MCNC: 0.95 MG/DL (ref 0.2–1)
BUN SERPL-MCNC: 21 MG/DL (ref 5–25)
CALCIUM SERPL-MCNC: 8.5 MG/DL (ref 8.4–10.2)
CHLORIDE SERPL-SCNC: 105 MMOL/L (ref 96–108)
CHOLEST SERPL-MCNC: 119 MG/DL
CO2 SERPL-SCNC: 25 MMOL/L (ref 21–32)
CREAT SERPL-MCNC: 0.81 MG/DL (ref 0.6–1.3)
CREAT UR-MCNC: 118.1 MG/DL
EOSINOPHIL # BLD AUTO: 0.17 THOUSAND/ÂΜL (ref 0–0.61)
EOSINOPHIL NFR BLD AUTO: 3 % (ref 0–6)
ERYTHROCYTE [DISTWIDTH] IN BLOOD BY AUTOMATED COUNT: 12.9 % (ref 11.6–15.1)
EST. AVERAGE GLUCOSE BLD GHB EST-MCNC: 177 MG/DL
GFR SERPL CREATININE-BSD FRML MDRD: 104 ML/MIN/1.73SQ M
GLUCOSE P FAST SERPL-MCNC: 147 MG/DL (ref 65–99)
HBA1C MFR BLD: 7.8 %
HCT VFR BLD AUTO: 45.6 % (ref 36.5–49.3)
HDLC SERPL-MCNC: 33 MG/DL
HGB BLD-MCNC: 15.9 G/DL (ref 12–17)
IMM GRANULOCYTES # BLD AUTO: 0.04 THOUSAND/UL (ref 0–0.2)
IMM GRANULOCYTES NFR BLD AUTO: 1 % (ref 0–2)
LDLC SERPL CALC-MCNC: 63 MG/DL (ref 0–100)
LYMPHOCYTES # BLD AUTO: 0.91 THOUSANDS/ÂΜL (ref 0.6–4.47)
LYMPHOCYTES NFR BLD AUTO: 18 % (ref 14–44)
MCH RBC QN AUTO: 29.8 PG (ref 26.8–34.3)
MCHC RBC AUTO-ENTMCNC: 34.9 G/DL (ref 31.4–37.4)
MCV RBC AUTO: 85 FL (ref 82–98)
MICROALBUMIN UR-MCNC: <7 MG/L
MICROALBUMIN/CREAT 24H UR: <6 MG/G CREATININE (ref 0–30)
MONOCYTES # BLD AUTO: 0.39 THOUSAND/ÂΜL (ref 0.17–1.22)
MONOCYTES NFR BLD AUTO: 8 % (ref 4–12)
NEUTROPHILS # BLD AUTO: 3.59 THOUSANDS/ÂΜL (ref 1.85–7.62)
NEUTS SEG NFR BLD AUTO: 69 % (ref 43–75)
NRBC BLD AUTO-RTO: 0 /100 WBCS
PLATELET # BLD AUTO: 163 THOUSANDS/UL (ref 149–390)
PMV BLD AUTO: 9.7 FL (ref 8.9–12.7)
POTASSIUM SERPL-SCNC: 4 MMOL/L (ref 3.5–5.3)
PROT SERPL-MCNC: 7.2 G/DL (ref 6.4–8.4)
RBC # BLD AUTO: 5.34 MILLION/UL (ref 3.88–5.62)
SODIUM SERPL-SCNC: 136 MMOL/L (ref 135–147)
TRIGL SERPL-MCNC: 115 MG/DL
TSH SERPL DL<=0.05 MIU/L-ACNC: 0.65 UIU/ML (ref 0.45–4.5)
WBC # BLD AUTO: 5.16 THOUSAND/UL (ref 4.31–10.16)

## 2024-06-01 PROCEDURE — 84443 ASSAY THYROID STIM HORMONE: CPT

## 2024-06-01 PROCEDURE — 36415 COLL VENOUS BLD VENIPUNCTURE: CPT

## 2024-06-01 PROCEDURE — 83036 HEMOGLOBIN GLYCOSYLATED A1C: CPT

## 2024-06-01 PROCEDURE — 80053 COMPREHEN METABOLIC PANEL: CPT

## 2024-06-01 PROCEDURE — 82570 ASSAY OF URINE CREATININE: CPT

## 2024-06-01 PROCEDURE — 85025 COMPLETE CBC W/AUTO DIFF WBC: CPT

## 2024-06-01 PROCEDURE — 80061 LIPID PANEL: CPT

## 2024-06-01 PROCEDURE — 82043 UR ALBUMIN QUANTITATIVE: CPT

## 2024-06-07 ENCOUNTER — OFFICE VISIT (OUTPATIENT)
Dept: FAMILY MEDICINE CLINIC | Facility: CLINIC | Age: 49
End: 2024-06-07
Payer: COMMERCIAL

## 2024-06-07 VITALS
OXYGEN SATURATION: 98 % | BODY MASS INDEX: 30.62 KG/M2 | SYSTOLIC BLOOD PRESSURE: 122 MMHG | DIASTOLIC BLOOD PRESSURE: 74 MMHG | WEIGHT: 202 LBS | RESPIRATION RATE: 16 BRPM | HEART RATE: 97 BPM | TEMPERATURE: 97.3 F | HEIGHT: 68 IN

## 2024-06-07 DIAGNOSIS — E11.65 UNCONTROLLED TYPE 2 DIABETES MELLITUS WITH HYPERGLYCEMIA (HCC): ICD-10-CM

## 2024-06-07 DIAGNOSIS — I10 ESSENTIAL HYPERTENSION: Primary | ICD-10-CM

## 2024-06-07 DIAGNOSIS — C73 MALIGNANT NEOPLASM OF THYROID GLAND (HCC): ICD-10-CM

## 2024-06-07 DIAGNOSIS — E78.2 MIXED HYPERLIPIDEMIA: ICD-10-CM

## 2024-06-07 DIAGNOSIS — Z00.01 ABNORMAL WELLNESS EXAM: ICD-10-CM

## 2024-06-07 DIAGNOSIS — E89.0 POSTOPERATIVE HYPOTHYROIDISM: ICD-10-CM

## 2024-06-07 PROCEDURE — 99396 PREV VISIT EST AGE 40-64: CPT | Performed by: FAMILY MEDICINE

## 2024-06-07 PROCEDURE — 99214 OFFICE O/P EST MOD 30 MIN: CPT | Performed by: FAMILY MEDICINE

## 2024-06-07 NOTE — PROGRESS NOTES
Ambulatory Visit  Name: Stefano Hassan      : 1975      MRN: [id number]  Encounter Provider: Marcus Castano MD  Encounter Date: 2024   Encounter department:  [facility] [address] [facility]    Assessment & Plan   1. Essential hypertension  Assessment & Plan:  Well controlled on Lotensin 20 mg. Continue medication as prescribed. Will continue to monitor.  2. Uncontrolled type 2 diabetes mellitus with hyperglycemia (HCC)  Assessment & Plan:  A1c is not well-controlled but improved from before.  Continue same and he was given sample for Tradjenta.  Discussed about low-carb diet and regular exercise.  Continue to monitor fasting glucose and A1c.  Come back in 3 months.  Lab Results   Component Value Date    HGBA1C 7.8 (H) 2024     Orders:  -     Hemoglobin A1C; Future; Expected date: 2024  -     TSH, 3rd generation with Free T4 reflex; Future; Expected date: 2024  -     Lipid Panel with Direct LDL reflex; Future; Expected date: 2024  -     Hemoglobin A1c (w/out EAG) ([facility] ONLY); Future; Expected date: 2024  -     Hemoglobin A1c (w/out EAG) ([facility] ONLY)  3. Mixed hyperlipidemia  Assessment & Plan:  Stable on atorvastatin.  Continue same and continue to follow with low-fat diet and regular exercise.  4. Abnormal wellness exam  Assessment & Plan:  It was discussed about immunizations, diet, exercise and safety measures.  5. Malignant neoplasm of thyroid gland (HCC)  Assessment & Plan:  Stable.  Continue thyroid replacement treatment.  6. Postoperative hypothyroidism  Assessment & Plan:  TSH is therapeutic.  Continue on levothyroxine 250 mcg daily.  Continue to monitor.         History of Present Illness     Is here today for follow-up multiple medical problems.  He has been taking medications.  Denies any side effect to his medications.  His blood work came back showing A1c improved slightly since we added Tradjenta.  He denies any side effect from the medications.  He  "denies any complaint today.      Review of Systems   Constitutional:  Negative for chills and fever.   HENT:  Negative for trouble swallowing.    Eyes:  Negative for visual disturbance.   Respiratory:  Negative for cough and shortness of breath.    Cardiovascular:  Negative for chest pain and palpitations.   Gastrointestinal:  Negative for abdominal pain, blood in stool and vomiting.   Endocrine: Negative for cold intolerance and heat intolerance.   Genitourinary:  Negative for difficulty urinating and dysuria.   Musculoskeletal:  Negative for gait problem.   Skin:  Negative for rash.   Neurological:  Negative for dizziness, syncope and headaches.   Hematological:  Negative for adenopathy.   Psychiatric/Behavioral:  Negative for behavioral problems.      Past Medical History:   Diagnosis Date   • Cancer (HCC)     Thyroid cancer   • Diabetes mellitus (HCC)    • Disease of thyroid gland    • Hyperlipidemia    • Hypertension    • Postoperative urinary retention    • Seizures (HCC)     x1 at age of 7     Past Surgical History:   Procedure Laterality Date   • NECK SURGERY  04/21/2017    \"fatty tumor removal\"   • [place] LAPAROSCOPY SURG CHOLECYSTECTOMY N/A 8/29/2022    Procedure: LAPAROSCOPIC CHOLECYSTECTOMY;  Surgeon: Dino Castellon MD;  Location: [facility];  Service: General   • THYROIDECTOMY     • WISDOM TOOTH EXTRACTION       Family History   Problem Relation Age of Onset   • Diabetes type II Mother         MELLITUS   • COPD Father    • Cancer Father         MB 2/4/16    LYMPHOMA CANCER     Social History     Tobacco Use   • Smoking status: Never   • Smokeless tobacco: Never   Vaping Use   • Vaping status: Never Used   Substance and Sexual Activity   • Alcohol use: Yes     Comment: less than 1 drink per week   • Drug use: No   • Sexual activity: Not on file     Current Outpatient Medications on File Prior to Visit   Medication Sig   • atorvastatin (LIPITOR) 20 mg tablet Take 1 tablet (20 mg total) by mouth daily at " "bedtime   • benazepril (LOTENSIN) 20 mg tablet Take 0.5 tablets (10 mg total) by mouth daily   • Dapagliflozin Pro-metFORMIN ER (Xigduo XR)  MG TB24 Take 1 tablet by mouth every morning   • glipiZIDE (GLUCOTROL XL) 10 mg 24 hr tablet TAKE 1 TABLET BY MOUTH EVERY DAY   • levothyroxine 200 mcg tablet Take 1 tablet (200 mcg total) by mouth daily   • levothyroxine 50 mcg tablet TAKE 1 TABLET BY MOUTH EVERY DAY   • sildenafil (VIAGRA) 100 mg tablet Take 1 tablet (100 mg total) by mouth daily as needed for erectile dysfunction   • ascorbic acid (VITAMIN C) 1000 MG tablet Take 1 tablet (1,000 mg total) by mouth every 12 (twelve) hours for 10 doses (Patient not taking: Reported on 12/1/2023)     No Known Allergies  Immunization History   Administered Date(s) Administered   • COVID-19 Moderna Vac BIVALENT 12 Yr+ IM 0.5 ML 10/23/2022   • COVID-19 PFIZER VACCINE 0.3 ML IM 05/15/2021, 06/05/2021, 12/19/2021   • INFLUENZA 10/21/2020, 10/15/2021, 09/08/2022, 10/08/2023   • Influenza, injectable, quadrivalent, preservative free 0.5 mL 09/30/2019, 10/21/2020   • Td (adult), adsorbed 12/29/2010   • Tdap 08/11/2021     Objective     /74 (BP Location: Left arm, Patient Position: Sitting, Cuff Size: Large)   Pulse 97   Temp (!) 97.3 °F (36.3 °C) (Tympanic)   Resp 16   Ht 5' 8\" (1.727 m)   Wt 91.6 kg (202 lb)   SpO2 98%   BMI 30.71 kg/m²     Physical Exam  Vitals and nursing note reviewed.   Constitutional:       Appearance: He is well-developed.   HENT:      Head: Normocephalic and atraumatic.   Eyes:      Pupils: Pupils are equal, round, and reactive to light.   Cardiovascular:      Rate and Rhythm: Normal rate and regular rhythm.      Heart sounds: Normal heart sounds.   Pulmonary:      Effort: Pulmonary effort is normal.      Breath sounds: Normal breath sounds.   Abdominal:      General: Bowel sounds are normal.      Palpations: Abdomen is soft.   Musculoskeletal:      Cervical back: Normal range of motion and " neck supple.   Lymphadenopathy:      Cervical: No cervical adenopathy.   Skin:     General: Skin is warm.      Findings: No rash.   Neurological:      Mental Status: He is alert and oriented to person, place, and time.       Administrative Statements

## 2024-06-07 NOTE — ASSESSMENT & PLAN NOTE
A1c is not well-controlled but improved from before.  Continue same and he was given sample for Tradjenta.  Discussed about low-carb diet and regular exercise.  Continue to monitor fasting glucose and A1c.  Come back in 3 months.  Lab Results   Component Value Date    HGBA1C 7.8 (H) 06/01/2024

## 2024-07-08 DIAGNOSIS — E11.65 UNCONTROLLED TYPE 2 DIABETES MELLITUS WITH HYPERGLYCEMIA (HCC): ICD-10-CM

## 2024-07-09 RX ORDER — DAPAGLIFLOZIN AND METFORMIN HYDROCHLORIDE 10; 1000 MG/1; MG/1
1 TABLET, FILM COATED, EXTENDED RELEASE ORAL EVERY MORNING
Qty: 100 TABLET | Refills: 1 | Status: SHIPPED | OUTPATIENT
Start: 2024-07-09

## 2024-07-27 DIAGNOSIS — E89.0 POSTOPERATIVE HYPOTHYROIDISM: ICD-10-CM

## 2024-07-27 DIAGNOSIS — E78.2 MIXED HYPERLIPIDEMIA: ICD-10-CM

## 2024-07-28 RX ORDER — ATORVASTATIN CALCIUM 20 MG/1
20 TABLET, FILM COATED ORAL
Qty: 100 TABLET | Refills: 1 | Status: SHIPPED | OUTPATIENT
Start: 2024-07-28

## 2024-07-28 RX ORDER — LEVOTHYROXINE SODIUM 0.2 MG/1
200 TABLET ORAL DAILY
Qty: 100 TABLET | Refills: 1 | Status: SHIPPED | OUTPATIENT
Start: 2024-07-28

## 2024-07-30 ENCOUNTER — OFFICE VISIT (OUTPATIENT)
Dept: URGENT CARE | Age: 49
End: 2024-07-30
Payer: COMMERCIAL

## 2024-07-30 VITALS
RESPIRATION RATE: 16 BRPM | BODY MASS INDEX: 30.62 KG/M2 | DIASTOLIC BLOOD PRESSURE: 78 MMHG | OXYGEN SATURATION: 96 % | HEART RATE: 93 BPM | SYSTOLIC BLOOD PRESSURE: 129 MMHG | HEIGHT: 68 IN | WEIGHT: 202 LBS | TEMPERATURE: 97 F

## 2024-07-30 DIAGNOSIS — J01.00 ACUTE MAXILLARY SINUSITIS, RECURRENCE NOT SPECIFIED: Primary | ICD-10-CM

## 2024-07-30 DIAGNOSIS — Z20.822 ENCOUNTER FOR LABORATORY TESTING FOR COVID-19 VIRUS: ICD-10-CM

## 2024-07-30 DIAGNOSIS — J40 BRONCHITIS: ICD-10-CM

## 2024-07-30 LAB
S PYO AG THROAT QL: NEGATIVE
SARS-COV-2 AG UPPER RESP QL IA: NEGATIVE
VALID CONTROL: NORMAL

## 2024-07-30 PROCEDURE — 87811 SARS-COV-2 COVID19 W/OPTIC: CPT | Performed by: PHYSICIAN ASSISTANT

## 2024-07-30 PROCEDURE — 87880 STREP A ASSAY W/OPTIC: CPT | Performed by: PHYSICIAN ASSISTANT

## 2024-07-30 PROCEDURE — G0382 LEV 3 HOSP TYPE B ED VISIT: HCPCS | Performed by: PHYSICIAN ASSISTANT

## 2024-07-30 PROCEDURE — S9083 URGENT CARE CENTER GLOBAL: HCPCS | Performed by: PHYSICIAN ASSISTANT

## 2024-07-30 PROCEDURE — 87070 CULTURE OTHR SPECIMN AEROBIC: CPT | Performed by: PHYSICIAN ASSISTANT

## 2024-07-30 RX ORDER — AZITHROMYCIN 250 MG/1
TABLET, FILM COATED ORAL
Qty: 6 TABLET | Refills: 0 | Status: SHIPPED | OUTPATIENT
Start: 2024-07-30 | End: 2024-08-03

## 2024-07-30 RX ORDER — BENZONATATE 100 MG/1
100 CAPSULE ORAL 3 TIMES DAILY PRN
Qty: 20 CAPSULE | Refills: 0 | Status: SHIPPED | OUTPATIENT
Start: 2024-07-30

## 2024-07-30 RX ORDER — FLUTICASONE PROPIONATE 50 MCG
1 SPRAY, SUSPENSION (ML) NASAL DAILY
Qty: 9.9 ML | Refills: 0 | Status: SHIPPED | OUTPATIENT
Start: 2024-07-30

## 2024-07-30 NOTE — LETTER
July 30, 2024     Patient: Stefano Hassan   YOB: 1975   Date of Visit: 7/30/2024       To Whom It May Concern:    It is my medical opinion that Stefano Hassan may return to work on 7/31/2024 .    If you have any questions or concerns, please don't hesitate to call.         Sincerely,        Leonel Ferguson Jr, BRANT    CC: No Recipients

## 2024-07-30 NOTE — PROGRESS NOTES
"[facility]        NAME: Stefano Hassan is a 49 y.o. male  : 1975    MRN: [id number]  DATE: 2024  TIME: 10:21 AM    /78   Pulse 93   Temp (!) 97 °F (36.1 °C)   Resp 16   Ht 5' 8\" (1.727 m)   Wt 91.6 kg (202 lb)   SpO2 96%   BMI 30.71 kg/m²     Assessment and Plan   Acute maxillary sinusitis, recurrence not specified [J01.00]  1. Acute maxillary sinusitis, recurrence not specified  POCT rapid ANTIGEN strepA    Poct Covid 19 Rapid Antigen Test    Throat culture    Throat culture    azithromycin (ZITHROMAX) 250 mg tablet    benzonatate (TESSALON PERLES) 100 mg capsule    fluticasone (FLONASE) 50 mcg/act nasal spray      2. Bronchitis  azithromycin (ZITHROMAX) 250 mg tablet    benzonatate (TESSALON PERLES) 100 mg capsule    fluticasone (FLONASE) 50 mcg/act nasal spray      3. Encounter for laboratory testing for COVID-19 virus  azithromycin (ZITHROMAX) 250 mg tablet    benzonatate (TESSALON PERLES) 100 mg capsule    fluticasone (FLONASE) 50 mcg/act nasal spray            Patient Instructions       Follow up with PCP in 3-5 days.  Proceed to  ER if symptoms worsen.    Chief Complaint     Chief Complaint   Patient presents with    Nasal Congestion     Started  with congestion, sore throat and body aches.          History of Present Illness       Pt with sore throat nasal congestion and some body aches for several days         Review of Systems   Review of Systems   Constitutional:  Positive for fatigue.   HENT:  Positive for congestion, sinus pressure, sinus pain and sore throat.    Eyes: Negative.    Respiratory:  Positive for cough.    Gastrointestinal: Negative.    Endocrine: Negative.    Genitourinary: Negative.    Musculoskeletal:  Positive for myalgias.   Skin: Negative.    Allergic/Immunologic: Negative.    Neurological: Negative.    Hematological: Negative.    Psychiatric/Behavioral: Negative.     All other systems reviewed and are negative.        Current Medications "       Current Outpatient Medications:     azithromycin (ZITHROMAX) 250 mg tablet, Take 2 tablets today then 1 tablet daily x 4 days, Disp: 6 tablet, Rfl: 0    benzonatate (TESSALON PERLES) 100 mg capsule, Take 1 capsule (100 mg total) by mouth 3 (three) times a day as needed for cough, Disp: 20 capsule, Rfl: 0    fluticasone (FLONASE) 50 mcg/act nasal spray, 1 spray into each nostril daily, Disp: 9.9 mL, Rfl: 0    ascorbic acid (VITAMIN C) 1000 MG tablet, Take 1 tablet (1,000 mg total) by mouth every 12 (twelve) hours for 10 doses (Patient not taking: Reported on 12/1/2023), Disp: 10 tablet, Rfl: 0    atorvastatin (LIPITOR) 20 mg tablet, TAKE 1 TABLET BY MOUTH DAILY AT BEDTIME, Disp: 100 tablet, Rfl: 1    benazepril (LOTENSIN) 20 mg tablet, Take 0.5 tablets (10 mg total) by mouth daily, Disp: 45 tablet, Rfl: 3    Dapagliflozin Pro-metFORMIN ER (Xigduo XR)  MG TB24, TAKE 1 TABLET BY MOUTH EVERY DAY IN THE MORNING, Disp: 100 tablet, Rfl: 1    glipiZIDE (GLUCOTROL XL) 10 mg 24 hr tablet, TAKE 1 TABLET BY MOUTH EVERY DAY, Disp: 90 tablet, Rfl: 1    levothyroxine 200 mcg tablet, TAKE 1 TABLET BY MOUTH EVERY DAY, Disp: 100 tablet, Rfl: 1    levothyroxine 50 mcg tablet, TAKE 1 TABLET BY MOUTH EVERY DAY, Disp: 90 tablet, Rfl: 1    sildenafil (VIAGRA) 100 mg tablet, Take 1 tablet (100 mg total) by mouth daily as needed for erectile dysfunction, Disp: 30 tablet, Rfl: 1    Current Allergies     Allergies as of 07/30/2024    (No Known Allergies)            The following portions of the patient's history were reviewed and updated as appropriate: allergies, current medications, past family history, past medical history, past social history, past surgical history and problem list.     Past Medical History:   Diagnosis Date    Cancer (HCC)     Thyroid cancer    Diabetes mellitus (HCC)     Disease of thyroid gland     Hyperlipidemia     Hypertension     Postoperative urinary retention     Seizures (HCC)     x1 at age of 7  "      Past Surgical History:   Procedure Laterality Date    NECK SURGERY  04/21/2017    \"fatty tumor removal\"    [place] LAPAROSCOPY SURG CHOLECYSTECTOMY N/A 8/29/2022    Procedure: LAPAROSCOPIC CHOLECYSTECTOMY;  Surgeon: Dino Castellon MD;  Location:  MAIN OR;  Service: General    THYROIDECTOMY      WISDOM TOOTH EXTRACTION         Family History   Problem Relation Age of Onset    Diabetes type II Mother         MELLITUS    COPD Father     Cancer Father         MB 2/4/16    LYMPHOMA CANCER         Medications have been verified.        Objective   /78   Pulse 93   Temp (!) 97 °F (36.1 °C)   Resp 16   Ht 5' 8\" (1.727 m)   Wt 91.6 kg (202 lb)   SpO2 96%   BMI 30.71 kg/m²        Physical Exam     Physical Exam  Vitals and nursing note reviewed.   Constitutional:       Appearance: Normal appearance. He is normal weight.   HENT:      Head: Normocephalic and atraumatic.      Right Ear: Tympanic membrane, ear canal and external ear normal.      Nose: Congestion and rhinorrhea present.      Mouth/Throat:      Mouth: Mucous membranes are moist.      Pharynx: Oropharynx is clear. Posterior oropharyngeal erythema present.   Eyes:      Extraocular Movements: Extraocular movements intact.      Conjunctiva/sclera: Conjunctivae normal.      Pupils: Pupils are equal, round, and reactive to light.   Cardiovascular:      Rate and Rhythm: Normal rate and regular rhythm.      Pulses: Normal pulses.      Heart sounds: Normal heart sounds.   Pulmonary:      Effort: Pulmonary effort is normal.      Comments: Minor coarse sounds cleared with cough   Abdominal:      General: Abdomen is flat. Bowel sounds are normal.      Palpations: Abdomen is soft.   Musculoskeletal:         General: Normal range of motion.      Cervical back: Normal range of motion and neck supple.   Skin:     General: Skin is warm.   Neurological:      Mental Status: He is alert and oriented to person, place, and time.                     "

## 2024-08-01 LAB — BACTERIA THROAT CULT: NORMAL

## 2024-09-07 ENCOUNTER — APPOINTMENT (OUTPATIENT)
Dept: LAB | Facility: CLINIC | Age: 49
End: 2024-09-07
Payer: COMMERCIAL

## 2024-09-07 DIAGNOSIS — E11.65 UNCONTROLLED TYPE 2 DIABETES MELLITUS WITH HYPERGLYCEMIA (HCC): ICD-10-CM

## 2024-09-07 LAB
CHOLEST SERPL-MCNC: 120 MG/DL
EST. AVERAGE GLUCOSE BLD GHB EST-MCNC: 177 MG/DL
HBA1C MFR BLD: 7.8 %
HDLC SERPL-MCNC: 31 MG/DL
LDLC SERPL CALC-MCNC: 69 MG/DL (ref 0–100)
T4 FREE SERPL-MCNC: 1.35 NG/DL (ref 0.61–1.12)
TRIGL SERPL-MCNC: 98 MG/DL
TSH SERPL DL<=0.05 MIU/L-ACNC: 0.17 UIU/ML (ref 0.45–4.5)

## 2024-09-07 PROCEDURE — 83036 HEMOGLOBIN GLYCOSYLATED A1C: CPT

## 2024-09-07 PROCEDURE — 84443 ASSAY THYROID STIM HORMONE: CPT

## 2024-09-07 PROCEDURE — 84439 ASSAY OF FREE THYROXINE: CPT

## 2024-09-07 PROCEDURE — 80061 LIPID PANEL: CPT

## 2024-09-07 PROCEDURE — 36415 COLL VENOUS BLD VENIPUNCTURE: CPT

## 2024-09-13 ENCOUNTER — OFFICE VISIT (OUTPATIENT)
Dept: FAMILY MEDICINE CLINIC | Facility: CLINIC | Age: 49
End: 2024-09-13
Payer: COMMERCIAL

## 2024-09-13 VITALS
DIASTOLIC BLOOD PRESSURE: 60 MMHG | RESPIRATION RATE: 16 BRPM | HEART RATE: 106 BPM | HEIGHT: 68 IN | BODY MASS INDEX: 30.62 KG/M2 | SYSTOLIC BLOOD PRESSURE: 102 MMHG | TEMPERATURE: 98 F | WEIGHT: 202 LBS | OXYGEN SATURATION: 98 %

## 2024-09-13 DIAGNOSIS — E11.65 UNCONTROLLED TYPE 2 DIABETES MELLITUS WITH HYPERGLYCEMIA (HCC): Primary | ICD-10-CM

## 2024-09-13 DIAGNOSIS — E89.0 POSTOPERATIVE HYPOTHYROIDISM: ICD-10-CM

## 2024-09-13 DIAGNOSIS — E78.2 MIXED HYPERLIPIDEMIA: ICD-10-CM

## 2024-09-13 DIAGNOSIS — Z12.5 PROSTATE CANCER SCREENING: ICD-10-CM

## 2024-09-13 DIAGNOSIS — C73 MALIGNANT NEOPLASM OF THYROID GLAND (HCC): ICD-10-CM

## 2024-09-13 DIAGNOSIS — I10 ESSENTIAL HYPERTENSION: ICD-10-CM

## 2024-09-13 PROCEDURE — 99214 OFFICE O/P EST MOD 30 MIN: CPT | Performed by: FAMILY MEDICINE

## 2024-09-13 RX ORDER — LEVOTHYROXINE SODIUM 25 UG/1
25 TABLET ORAL DAILY
Qty: 90 TABLET | Refills: 1 | Status: SHIPPED | OUTPATIENT
Start: 2024-09-13

## 2024-09-13 NOTE — ASSESSMENT & PLAN NOTE
Not well-controlled.  Discussed about low-carb diet and regular exercise.  Continue same management for now but he needs to cut down on his carb intake.  Lab Results   Component Value Date    HGBA1C 7.8 (H) 09/07/2024       Orders:    Albumin / creatinine urine ratio; Future    Comprehensive metabolic panel; Future    Hemoglobin A1C; Future    CBC and differential; Future    Lipid Panel with Direct LDL reflex; Future    TSH, 3rd generation with Free T4 reflex; Future    Albumin / creatinine urine ratio    Comprehensive metabolic panel    Hemoglobin A1C    CBC and differential    Lipid Panel with Direct LDL reflex    TSH, 3rd generation with Free T4 reflex

## 2024-09-13 NOTE — PROGRESS NOTES
Ambulatory Visit  Name: Stefano Hassan      : 1975      MRN: [id number]  Encounter Provider: Marcus Castano MD  Encounter Date: 2024   Encounter department: [facility] [address] [facility]    Assessment & Plan  Uncontrolled type 2 diabetes mellitus with hyperglycemia (HCC)  Not well-controlled.  Discussed about low-carb diet and regular exercise.  Continue same management for now but he needs to cut down on his carb intake.  Lab Results   Component Value Date    HGBA1C 7.8 (H) 2024       Orders:    Albumin / creatinine urine ratio; Future    Comprehensive metabolic panel; Future    Hemoglobin A1C; Future    CBC and differential; Future    Lipid Panel with Direct LDL reflex; Future    TSH, 3rd generation with Free T4 reflex; Future    Albumin / creatinine urine ratio    Comprehensive metabolic panel    Hemoglobin A1C    CBC and differential    Lipid Panel with Direct LDL reflex    TSH, 3rd generation with Free T4 reflex    Postoperative hypothyroidism  TSH came back low.  Elevated T4.  I am going to cut down on levothyroxine to 225 mcg daily.  We will continue to monitor TSH and free T4.    Orders:    levothyroxine 25 mcg tablet; Take 1 tablet (25 mcg total) by mouth daily    Essential hypertension  Well controlled on Lotensin 20 mg. Continue medication as prescribed. Will continue to monitor.         Malignant neoplasm of thyroid gland (HCC)  Stable.  Continue thyroid replacement treatment.         Mixed hyperlipidemia  Stable on atorvastatin.  Continue same and continue to follow with low-fat diet and regular exercise.         Prostate cancer screening    Orders:    PSA, Total Screen; Future    PSA, Total Screen         History of Present Illness     He is here today for follow-up multiple medical problems.  He has been taking his medications.  He denies any side effects.  He does not watch his diet very well.  He had blood work done showed A1c elevated at 7.8.  His TSH came back low with  "elevated free T4.  He denies any complaint today.    Hypertension  Pertinent negatives include no chest pain, headaches, palpitations or shortness of breath.   Hyperlipidemia  Pertinent negatives include no chest pain or shortness of breath.     Review of Systems   Constitutional:  Negative for chills and fever.   HENT:  Negative for trouble swallowing.    Eyes:  Negative for visual disturbance.   Respiratory:  Negative for cough and shortness of breath.    Cardiovascular:  Negative for chest pain and palpitations.   Gastrointestinal:  Negative for abdominal pain, blood in stool and vomiting.   Endocrine: Negative for cold intolerance and heat intolerance.   Genitourinary:  Negative for difficulty urinating and dysuria.   Musculoskeletal:  Negative for gait problem.   Skin:  Negative for rash.   Neurological:  Negative for dizziness, syncope and headaches.   Hematological:  Negative for adenopathy.   Psychiatric/Behavioral:  Negative for behavioral problems.      Past Medical History:   Diagnosis Date    Cancer (HCC)     Thyroid cancer    Diabetes mellitus (HCC)     Disease of thyroid gland     Hyperlipidemia     Hypertension     Postoperative urinary retention     Seizures (HCC)     x1 at age of 7     Past Surgical History:   Procedure Laterality Date    NECK SURGERY  04/21/2017    \"fatty tumor removal\"    [place] LAPAROSCOPY SURG CHOLECYSTECTOMY N/A 8/29/2022    Procedure: LAPAROSCOPIC CHOLECYSTECTOMY;  Surgeon: Dino Castellon MD;  Location:  MAIN OR;  Service: General    THYROIDECTOMY      WISDOM TOOTH EXTRACTION       Family History   Problem Relation Age of Onset    Diabetes type II Mother         MELLITUS    COPD Father     Cancer Father         MB 2/4/16    LYMPHOMA CANCER     Social History     Tobacco Use    Smoking status: Never    Smokeless tobacco: Never   Vaping Use    Vaping status: Never Used   Substance and Sexual Activity    Alcohol use: Yes     Comment: less than 1 drink per week    Drug use: No    " "Sexual activity: Not on file     Current Outpatient Medications on File Prior to Visit   Medication Sig    atorvastatin (LIPITOR) 20 mg tablet TAKE 1 TABLET BY MOUTH DAILY AT BEDTIME    benazepril (LOTENSIN) 20 mg tablet Take 0.5 tablets (10 mg total) by mouth daily    Dapagliflozin Pro-metFORMIN ER (Xigduo XR)  MG TB24 TAKE 1 TABLET BY MOUTH EVERY DAY IN THE MORNING    fluticasone (FLONASE) 50 mcg/act nasal spray 1 spray into each nostril daily    glipiZIDE (GLUCOTROL XL) 10 mg 24 hr tablet TAKE 1 TABLET BY MOUTH EVERY DAY    levothyroxine 200 mcg tablet TAKE 1 TABLET BY MOUTH EVERY DAY    sildenafil (VIAGRA) 100 mg tablet Take 1 tablet (100 mg total) by mouth daily as needed for erectile dysfunction    [DISCONTINUED] levothyroxine 50 mcg tablet TAKE 1 TABLET BY MOUTH EVERY DAY    [DISCONTINUED] ascorbic acid (VITAMIN C) 1000 MG tablet Take 1 tablet (1,000 mg total) by mouth every 12 (twelve) hours for 10 doses    [DISCONTINUED] benzonatate (TESSALON PERLES) 100 mg capsule Take 1 capsule (100 mg total) by mouth 3 (three) times a day as needed for cough (Patient not taking: Reported on 9/13/2024)     No Known Allergies  Immunization History   Administered Date(s) Administered    COVID-19 Moderna Vac BIVALENT 12 Yr+ IM 0.5 ML 10/23/2022    COVID-19 PFIZER VACCINE 0.3 ML IM 05/15/2021, 06/05/2021, 12/19/2021    INFLUENZA 10/21/2020, 10/15/2021, 09/08/2022, 10/08/2023    Influenza, injectable, quadrivalent, preservative free 0.5 mL 09/30/2019, 10/21/2020    Td (adult), adsorbed 12/29/2010    Tdap 08/11/2021     Objective     /60 (BP Location: Left arm, Patient Position: Sitting, Cuff Size: Adult)   Pulse (!) 106   Temp 98 °F (36.7 °C) (Tympanic)   Resp 16   Ht 5' 8\" (1.727 m)   Wt 91.6 kg (202 lb)   SpO2 98%   BMI 30.71 kg/m²     Physical Exam  Vitals and nursing note reviewed.   Constitutional:       Appearance: He is well-developed.   HENT:      Head: Normocephalic and atraumatic.   Eyes:      " Pupils: Pupils are equal, round, and reactive to light.   Cardiovascular:      Rate and Rhythm: Normal rate and regular rhythm.      Heart sounds: Normal heart sounds.   Pulmonary:      Effort: Pulmonary effort is normal.      Breath sounds: Normal breath sounds.   Abdominal:      General: Bowel sounds are normal.      Palpations: Abdomen is soft.   Musculoskeletal:      Cervical back: Normal range of motion and neck supple.   Lymphadenopathy:      Cervical: No cervical adenopathy.   Skin:     General: Skin is warm.      Findings: No rash.   Neurological:      Mental Status: He is alert and oriented to person, place, and time.

## 2024-09-13 NOTE — ASSESSMENT & PLAN NOTE
TSH came back low.  Elevated T4.  I am going to cut down on levothyroxine to 225 mcg daily.  We will continue to monitor TSH and free T4.    Orders:    levothyroxine 25 mcg tablet; Take 1 tablet (25 mcg total) by mouth daily

## 2024-09-13 NOTE — ASSESSMENT & PLAN NOTE
Well controlled on Lotensin 20 mg. Continue medication as prescribed. Will continue to monitor.

## 2024-10-02 ENCOUNTER — VBI (OUTPATIENT)
Dept: ADMINISTRATIVE | Facility: OTHER | Age: 49
End: 2024-10-02

## 2024-10-02 NOTE — TELEPHONE ENCOUNTER
10/02/24 8:17 AM     Chart reviewed for Diabetic Eye Exam ; nothing is submitted to the patient's insurance at this time.     TONY ROMO MA   [facility]

## 2024-11-23 DIAGNOSIS — E11.65 UNCONTROLLED TYPE 2 DIABETES MELLITUS WITH HYPERGLYCEMIA (HCC): ICD-10-CM

## 2024-11-25 DIAGNOSIS — E89.0 POSTOPERATIVE HYPOTHYROIDISM: ICD-10-CM

## 2024-11-25 DIAGNOSIS — I10 ESSENTIAL HYPERTENSION: ICD-10-CM

## 2024-11-25 RX ORDER — GLIPIZIDE 10 MG/1
10 TABLET, FILM COATED, EXTENDED RELEASE ORAL DAILY
Qty: 90 TABLET | Refills: 1 | Status: SHIPPED | OUTPATIENT
Start: 2024-11-25

## 2024-11-27 RX ORDER — LEVOTHYROXINE SODIUM 200 UG/1
200 TABLET ORAL DAILY
Qty: 90 TABLET | Refills: 1 | Status: SHIPPED | OUTPATIENT
Start: 2024-11-27

## 2024-11-27 RX ORDER — BENAZEPRIL HYDROCHLORIDE 20 MG/1
10 TABLET ORAL DAILY
Qty: 45 TABLET | Refills: 1 | Status: SHIPPED | OUTPATIENT
Start: 2024-11-27

## 2025-02-10 ENCOUNTER — HOSPITAL ENCOUNTER (EMERGENCY)
Facility: HOSPITAL | Age: 50
Discharge: HOME | End: 2025-02-10
Attending: EMERGENCY MEDICINE | Admitting: EMERGENCY MEDICINE
Payer: COMMERCIAL

## 2025-02-10 VITALS
DIASTOLIC BLOOD PRESSURE: 79 MMHG | TEMPERATURE: 97.1 F | HEART RATE: 92 BPM | OXYGEN SATURATION: 97 % | SYSTOLIC BLOOD PRESSURE: 134 MMHG | RESPIRATION RATE: 18 BRPM

## 2025-02-10 DIAGNOSIS — H66.90 OTITIS MEDIA: Primary | ICD-10-CM

## 2025-02-10 PROCEDURE — 99284 EMERGENCY DEPT VISIT MOD MDM: CPT | Performed by: EMERGENCY MEDICINE

## 2025-02-10 PROCEDURE — 99283 EMERGENCY DEPT VISIT LOW MDM: CPT

## 2025-02-10 RX ORDER — AMOXICILLIN 250 MG/1
750 CAPSULE ORAL ONCE
Status: COMPLETED | OUTPATIENT
Start: 2025-02-10 | End: 2025-02-10

## 2025-02-10 RX ORDER — AMOXICILLIN 875 MG/1
875 TABLET, COATED ORAL 2 TIMES DAILY
Qty: 20 TABLET | Refills: 0 | Status: SHIPPED | OUTPATIENT
Start: 2025-02-10 | End: 2025-02-20

## 2025-02-10 RX ORDER — ACETAMINOPHEN 325 MG/1
975 TABLET ORAL ONCE
Status: COMPLETED | OUTPATIENT
Start: 2025-02-10 | End: 2025-02-10

## 2025-02-10 RX ADMIN — ACETAMINOPHEN 975 MG: 325 TABLET, FILM COATED ORAL at 07:32

## 2025-02-10 RX ADMIN — AMOXICILLIN 750 MG: 250 CAPSULE ORAL at 07:32

## 2025-02-10 NOTE — ED PROVIDER NOTES
"Time reflects when diagnosis was documented in both MDM as applicable and the Disposition within this note       Time User Action Codes Description Comment    2/10/2025  7:21 AM Valerio Pope S Add [H66.90] Otitis media           ED Disposition       ED Disposition   Discharge    Condition   Stable    Date/Time   Mon Feb 10, 2025  7:20 AM    Comment   Stefano Hassan discharge to home/self care.                   Assessment & Plan       Medical Decision Making  DDx including but not limited to: Otitis media, otitis externa, bullous myringitis, perforated TM, impacted cerumen, cellulitis.     Risk  OTC drugs.  Prescription drug management.             Medications   amoxicillin (AMOXIL) capsule 750 mg (has no administration in time range)   acetaminophen (TYLENOL) tablet 975 mg (has no administration in time range)       ED Risk Strat Scores                                              History of Present Illness       Chief Complaint   Patient presents with    Ear Problem     Left ear pain       Past Medical History:   Diagnosis Date    Cancer (HCC)     Thyroid cancer    Diabetes mellitus (HCC)     Disease of thyroid gland     Hyperlipidemia     Hypertension     Postoperative urinary retention     Seizures (HCC)     x1 at age of 7      Past Surgical History:   Procedure Laterality Date    NECK SURGERY  04/21/2017    \"fatty tumor removal\"    TX LAPAROSCOPY SURG CHOLECYSTECTOMY N/A 8/29/2022    Procedure: LAPAROSCOPIC CHOLECYSTECTOMY;  Surgeon: Dino Castellon MD;  Location:  MAIN OR;  Service: General    THYROIDECTOMY      WISDOM TOOTH EXTRACTION        Family History   Problem Relation Age of Onset    Diabetes type II Mother         MELLITUS    COPD Father     Cancer Father         MB 2/4/16    LYMPHOMA CANCER      Social History     Tobacco Use    Smoking status: Never    Smokeless tobacco: Never   Vaping Use    Vaping status: Never Used   Substance Use Topics    Alcohol use: Yes     Comment: less than 1 drink per " week    Drug use: No      E-Cigarette/Vaping    E-Cigarette Use Never User       E-Cigarette/Vaping Substances    Nicotine No     THC No     CBD No     Flavoring No     Other No     Unknown No       I have reviewed and agree with the history as documented.     Patient is a 49 year old male with a few days of left ear pain. No fever. No cough. No N/V. Was last seen at [facility] on 9/13/24 for uncontrolled type 2 DM. [facility] website checked on this patient and no Rx found. No decreased hearing.       History provided by:  Patient   used: No        Review of Systems   Constitutional:  Negative for fever.   HENT:  Positive for ear pain. Negative for hearing loss.            Objective       ED Triage Vitals [02/10/25 0702]   Temperature Pulse Blood Pressure Respirations SpO2 Patient Position - Orthostatic VS   (!) 97.1 °F (36.2 °C) 92 134/79 18 97 % --      Temp Source Heart Rate Source BP Location FiO2 (%) Pain Score    Oral Monitor -- -- --      Vitals      Date and Time Temp Pulse SpO2 Resp BP Pain Score FACES Pain Rating User   02/10/25 0702 97.1 °F (36.2 °C) 92 97 % 18 134/79 -- -- SG            Physical Exam  Vitals and nursing note reviewed.   Constitutional:       General: He is in acute distress (mild).   HENT:      Head: Normocephalic and atraumatic.      Right Ear: Tympanic membrane, ear canal and external ear normal.      Left Ear: Ear canal and external ear normal.      Ears:      Comments: L TM opaque with white material behind TM     Mouth/Throat:      Mouth: Mucous membranes are moist.      Pharynx: Oropharynx is clear. No oropharyngeal exudate or posterior oropharyngeal erythema.   Eyes:      General: No scleral icterus.  Pulmonary:      Effort: No respiratory distress.   Musculoskeletal:      Cervical back: Normal range of motion and neck supple. No rigidity.   Skin:     General: Skin is warm and dry.      Findings: No erythema or rash.   Neurological:       Mental Status: He is alert.         Results Reviewed       None            No orders to display       Procedures    ED Medication and Procedure Management   Prior to Admission Medications   Prescriptions Last Dose Informant Patient Reported? Taking?   Dapagliflozin Pro-metFORMIN ER (Xigduo XR)  MG TB24   No No   Sig: TAKE 1 TABLET BY MOUTH EVERY DAY IN THE MORNING   atorvastatin (LIPITOR) 20 mg tablet   No No   Sig: TAKE 1 TABLET BY MOUTH DAILY AT BEDTIME   benazepril (LOTENSIN) 20 mg tablet   No No   Sig: TAKE 1/2 TABLET BY MOUTH DAILY   fluticasone (FLONASE) 50 mcg/act nasal spray   No No   Si spray into each nostril daily   glipiZIDE (GLUCOTROL XL) 10 mg 24 hr tablet   No No   Sig: TAKE 1 TABLET BY MOUTH EVERY DAY   levothyroxine 200 mcg tablet   No No   Sig: TAKE 1 TABLET BY MOUTH EVERY DAY   levothyroxine 25 mcg tablet   No No   Sig: Take 1 tablet (25 mcg total) by mouth daily   sildenafil (VIAGRA) 100 mg tablet   No No   Sig: Take 1 tablet (100 mg total) by mouth daily as needed for erectile dysfunction      Facility-Administered Medications: None     Patient's Medications   Discharge Prescriptions    AMOXICILLIN (AMOXIL) 875 MG TABLET    Take 1 tablet (875 mg total) by mouth 2 (two) times a day for 10 days       Start Date: 2/10/2025 End Date: 2025       Order Dose: 875 mg       Quantity: 20 tablet    Refills: 0     No discharge procedures on file.  ED SEPSIS DOCUMENTATION   Time reflects when diagnosis was documented in both MDM as applicable and the Disposition within this note       Time User Action Codes Description Comment    2/10/2025  7:21 AM Valerio Pope Add [H66.90] Otitis media                  Valerio Pope MD  02/10/25 0723       Valerio Pope MD  02/10/25 0723

## 2025-02-10 NOTE — Clinical Note
Stefano Hassan was seen and treated in our emergency department on 2/10/2025.    No restrictions            Diagnosis:     Stefano  is off the rest of the shift today.    He may return on this date:          If you have any questions or concerns, please don't hesitate to call.      Valerio Pope MD    ______________________________           _______________          _______________  Hospital Representative                              Date                                Time

## 2025-02-21 ENCOUNTER — APPOINTMENT (EMERGENCY)
Dept: RADIOLOGY | Facility: HOSPITAL | Age: 50
End: 2025-02-21
Payer: COMMERCIAL

## 2025-02-21 ENCOUNTER — HOSPITAL ENCOUNTER (EMERGENCY)
Facility: HOSPITAL | Age: 50
Discharge: HOME | End: 2025-02-21
Attending: EMERGENCY MEDICINE
Payer: COMMERCIAL

## 2025-02-21 VITALS
RESPIRATION RATE: 18 BRPM | SYSTOLIC BLOOD PRESSURE: 148 MMHG | OXYGEN SATURATION: 95 % | DIASTOLIC BLOOD PRESSURE: 88 MMHG | HEART RATE: 100 BPM | TEMPERATURE: 100.1 F

## 2025-02-21 DIAGNOSIS — U07.1 COVID: Primary | ICD-10-CM

## 2025-02-21 DIAGNOSIS — R73.9 HYPERGLYCEMIA: ICD-10-CM

## 2025-02-21 LAB
ALBUMIN SERPL BCG-MCNC: 4.2 G/DL (ref 3.5–5)
ALP SERPL-CCNC: 71 U/L (ref 34–104)
ALT SERPL W P-5'-P-CCNC: 18 U/L (ref 7–52)
ANION GAP SERPL CALCULATED.3IONS-SCNC: 6 MMOL/L (ref 4–13)
AST SERPL W P-5'-P-CCNC: 11 U/L (ref 13–39)
BASOPHILS # BLD AUTO: 0.03 THOUSANDS/ΜL (ref 0–0.1)
BASOPHILS NFR BLD AUTO: 1 % (ref 0–1)
BILIRUB SERPL-MCNC: 1.63 MG/DL (ref 0.2–1)
BUN SERPL-MCNC: 14 MG/DL (ref 5–25)
CALCIUM SERPL-MCNC: 8.2 MG/DL (ref 8.4–10.2)
CHLORIDE SERPL-SCNC: 105 MMOL/L (ref 96–108)
CO2 SERPL-SCNC: 25 MMOL/L (ref 21–32)
CREAT SERPL-MCNC: 0.85 MG/DL (ref 0.6–1.3)
EOSINOPHIL # BLD AUTO: 0.04 THOUSAND/ΜL (ref 0–0.61)
EOSINOPHIL NFR BLD AUTO: 1 % (ref 0–6)
ERYTHROCYTE [DISTWIDTH] IN BLOOD BY AUTOMATED COUNT: 13 % (ref 11.6–15.1)
FLUAV AG UPPER RESP QL IA.RAPID: NEGATIVE
FLUBV AG UPPER RESP QL IA.RAPID: NEGATIVE
GFR SERPL CREATININE-BSD FRML MDRD: 101 ML/MIN/1.73SQ M
GLUCOSE SERPL-MCNC: 191 MG/DL (ref 65–140)
GLUCOSE SERPL-MCNC: 288 MG/DL (ref 65–140)
GLUCOSE SERPL-MCNC: 309 MG/DL (ref 65–140)
HCT VFR BLD AUTO: 44.6 % (ref 36.5–49.3)
HGB BLD-MCNC: 15.9 G/DL (ref 12–17)
IMM GRANULOCYTES # BLD AUTO: 0.03 THOUSAND/UL (ref 0–0.2)
IMM GRANULOCYTES NFR BLD AUTO: 1 % (ref 0–2)
LYMPHOCYTES # BLD AUTO: 0.29 THOUSANDS/ΜL (ref 0.6–4.47)
LYMPHOCYTES NFR BLD AUTO: 7 % (ref 14–44)
MCH RBC QN AUTO: 29.7 PG (ref 26.8–34.3)
MCHC RBC AUTO-ENTMCNC: 35.7 G/DL (ref 31.4–37.4)
MCV RBC AUTO: 83 FL (ref 82–98)
MONOCYTES # BLD AUTO: 0.19 THOUSAND/ΜL (ref 0.17–1.22)
MONOCYTES NFR BLD AUTO: 5 % (ref 4–12)
NEUTROPHILS # BLD AUTO: 3.59 THOUSANDS/ΜL (ref 1.85–7.62)
NEUTS SEG NFR BLD AUTO: 85 % (ref 43–75)
NRBC BLD AUTO-RTO: 0 /100 WBCS
PLATELET # BLD AUTO: 122 THOUSANDS/UL (ref 149–390)
PMV BLD AUTO: 9.2 FL (ref 8.9–12.7)
POTASSIUM SERPL-SCNC: 3.8 MMOL/L (ref 3.5–5.3)
PROT SERPL-MCNC: 6.9 G/DL (ref 6.4–8.4)
RBC # BLD AUTO: 5.35 MILLION/UL (ref 3.88–5.62)
SARS-COV+SARS-COV-2 AG RESP QL IA.RAPID: POSITIVE
SODIUM SERPL-SCNC: 136 MMOL/L (ref 135–147)
WBC # BLD AUTO: 4.17 THOUSAND/UL (ref 4.31–10.16)

## 2025-02-21 PROCEDURE — 96374 THER/PROPH/DIAG INJ IV PUSH: CPT

## 2025-02-21 PROCEDURE — 36415 COLL VENOUS BLD VENIPUNCTURE: CPT | Performed by: EMERGENCY MEDICINE

## 2025-02-21 PROCEDURE — 71046 X-RAY EXAM CHEST 2 VIEWS: CPT

## 2025-02-21 PROCEDURE — 99283 EMERGENCY DEPT VISIT LOW MDM: CPT

## 2025-02-21 PROCEDURE — 87811 SARS-COV-2 COVID19 W/OPTIC: CPT

## 2025-02-21 PROCEDURE — 96361 HYDRATE IV INFUSION ADD-ON: CPT

## 2025-02-21 PROCEDURE — 82948 REAGENT STRIP/BLOOD GLUCOSE: CPT

## 2025-02-21 PROCEDURE — 85025 COMPLETE CBC W/AUTO DIFF WBC: CPT | Performed by: EMERGENCY MEDICINE

## 2025-02-21 PROCEDURE — 87804 INFLUENZA ASSAY W/OPTIC: CPT

## 2025-02-21 PROCEDURE — 99284 EMERGENCY DEPT VISIT MOD MDM: CPT | Performed by: EMERGENCY MEDICINE

## 2025-02-21 PROCEDURE — 80053 COMPREHEN METABOLIC PANEL: CPT | Performed by: EMERGENCY MEDICINE

## 2025-02-21 RX ORDER — METFORMIN HYDROCHLORIDE 500 MG/1
1000 TABLET, EXTENDED RELEASE ORAL ONCE
Status: COMPLETED | OUTPATIENT
Start: 2025-02-21 | End: 2025-02-21

## 2025-02-21 RX ORDER — NIRMATRELVIR AND RITONAVIR 300-100 MG
3 KIT ORAL 2 TIMES DAILY
Qty: 30 TABLET | Refills: 0 | Status: SHIPPED | OUTPATIENT
Start: 2025-02-21 | End: 2025-02-26

## 2025-02-21 RX ORDER — IBUPROFEN 400 MG/1
400 TABLET, FILM COATED ORAL ONCE
Status: DISCONTINUED | OUTPATIENT
Start: 2025-02-21 | End: 2025-02-21

## 2025-02-21 RX ORDER — KETOROLAC TROMETHAMINE 30 MG/ML
15 INJECTION, SOLUTION INTRAMUSCULAR; INTRAVENOUS ONCE
Status: COMPLETED | OUTPATIENT
Start: 2025-02-21 | End: 2025-02-21

## 2025-02-21 RX ORDER — GLIPIZIDE 2.5 MG/1
10 TABLET, EXTENDED RELEASE ORAL ONCE
Status: COMPLETED | OUTPATIENT
Start: 2025-02-21 | End: 2025-02-21

## 2025-02-21 RX ADMIN — KETOROLAC TROMETHAMINE 15 MG: 30 INJECTION, SOLUTION INTRAMUSCULAR; INTRAVENOUS at 07:41

## 2025-02-21 RX ADMIN — SODIUM CHLORIDE 1000 ML: 0.9 INJECTION, SOLUTION INTRAVENOUS at 07:45

## 2025-02-21 RX ADMIN — METFORMIN HYDROCHLORIDE 1000 MG: 500 TABLET, EXTENDED RELEASE ORAL at 07:53

## 2025-02-21 RX ADMIN — GLIPIZIDE 10 MG: 2.5 TABLET, FILM COATED, EXTENDED RELEASE ORAL at 07:54

## 2025-02-21 NOTE — DISCHARGE INSTRUCTIONS
Please stop taking your cholesterol medication (atorvastatin) while taking Paxlovid.  You should resume taking atorvastatin immediately once you are finished taking Paxlovid.  Please do not take sildenafil (viagra) while taking Paxlovid or for 3 days afterwards.

## 2025-02-21 NOTE — ED ATTENDING ATTESTATION
2/21/2025  I, Rossy Moody MD, saw and evaluated the patient. I have discussed the patient with the resident/non-physician practitioner and agree with the resident's/non-physician practitioner's findings, Plan of Care, and MDM as documented in the resident's/non-physician practitioner's note, except where noted. All available labs and Radiology studies were reviewed.  I was present for key portions of any procedure(s) performed by the resident/non-physician practitioner and I was immediately available to provide assistance.       At this point I agree with the current assessment done in the Emergency Department.  I have conducted an independent evaluation of this patient a history and physical is as follows:    49-year-old male with history of non-insulin-dependent diabetes and hypothyroidism presenting for evaluation of 1 day of nasal congestion, subjective fevers, sore throat, and general malaise.  Accompanied by a dry nonproductive cough.  Reports a 4 out of 10 frontal headache without vision changes.  He was recently treated for left otitis media. Admits to discontinuing the antibiotic early because his symptoms resolved, denies any persistent ear pain.  Denies chest pain or shortness of breath.  He has been taking his diabetes medications as prescribed, admits to not checking his blood sugar at home.  He has been taking DayQuil and NyQuil for his symptoms.    Physical Exam  Vitals and nursing note reviewed.   Constitutional:       General: He is not in acute distress.     Appearance: He is well-developed. He is not ill-appearing, toxic-appearing or diaphoretic.   HENT:      Head: Normocephalic and atraumatic.      Right Ear: Tympanic membrane and external ear normal.      Left Ear: External ear normal.      Ears:      Comments: L TM erythematous but non-bulging     Nose: Nose normal.      Mouth/Throat:      Mouth: Mucous membranes are moist.      Pharynx: Oropharynx is clear. No oropharyngeal exudate or  posterior oropharyngeal erythema.   Eyes:      Extraocular Movements: Extraocular movements intact.      Conjunctiva/sclera: Conjunctivae normal.      Pupils: Pupils are equal, round, and reactive to light.   Cardiovascular:      Rate and Rhythm: Regular rhythm. Tachycardia present.      Pulses: Normal pulses.      Heart sounds: Normal heart sounds. No murmur heard.     No friction rub. No gallop.   Pulmonary:      Effort: Pulmonary effort is normal. No respiratory distress.      Breath sounds: Normal breath sounds. No wheezing or rales.   Abdominal:      General: Bowel sounds are normal. There is no distension.      Palpations: Abdomen is soft.      Tenderness: There is no abdominal tenderness. There is no guarding.   Musculoskeletal:         General: No deformity. Normal range of motion.      Cervical back: Normal range of motion and neck supple. No rigidity.      Right lower leg: No edema.      Left lower leg: No edema.      Comments: No calf swelling or tenderness   Skin:     General: Skin is warm and dry.   Neurological:      Mental Status: He is alert and oriented to person, place, and time.      Motor: No abnormal muscle tone.      Comments: Normal speech, normal gait.  Cranial nerves grossly intact.  Moves all 4 extremities with normal strength.           ED Course  ED Course as of 02/21/25 1654   Fri Feb 21, 2025   0814 Patient is well-appearing, nontoxic.  Basic labs ordered given point-of-care glucose of 309.  CMP reveals glucose of 288, normal anion gap and bicarb, doubt DKA.  Hyperglycemia likely present in the setting of current viral-like illness.  Bilirubin elevated to 1.63 but liver enzymes and alk phos are unremarkable, patient has no abdominal pain, nausea, or vomiting, doubt acute biliary pathology.  Morning diabetes medications ordered.  CBC remarkable for mild leukopenia and thrombocytopenia, hemoglobin and red blood cells normal.   0925 Chest x-ray with no acute cardiopulmonary abnormalities.   Patient is positive for COVID.   0927 Given comorbidities, will prescribe Paxlovid.  Patient instructed to avoid use of sildenafil during treatment with Paxlovid therapy and for 3 days after last dose of Paxlovid.  He was also counseled to temporarily discontinue his statin medication while taking Paxlovid, and resume it immediately after finishing Paxlovid therapy.   0953 Repeat glucose 191.          Critical Care Time  Procedures

## 2025-02-21 NOTE — ED PROVIDER NOTES
Time reflects when diagnosis was documented in both MDM as applicable and the Disposition within this note       Time User Action Codes Description Comment    2/21/2025 10:08 AM Hortencia Gutierrez [U07.1] COVID     2/21/2025 10:08 AM Hortencia Gutierrez [R73.9] Hyperglycemia           ED Disposition       ED Disposition   Discharge    Condition   Stable    Date/Time   Fri Feb 21, 2025 10:08 AM    Comment   Stefano Hassan discharge to home/self care.                   Assessment & Plan       Medical Decision Making  Amount and/or Complexity of Data Reviewed  Labs: ordered. Decision-making details documented in ED Course.  Radiology: ordered.    Risk  Prescription drug management.    See ED course for MDM.      ED Course as of 02/21/25 2115 Fri Feb 21, 2025   0800 DDx including but not limited to: viral illness, pneumonia, URI, OM, pharyngitis, influenza, COVID-19   0812 POC Glucose(!): 309   0813 ANION GAP: 6  No gap, doubt DKA   0842 Per lab, swab gave error, will need re-collection   0914 Pulse: 100  Improved after tylenol and fluids   1008 Given patient's comorbidity, offered Paxlovid for his positive COVID, which patient was agreeable to taking.  Discussed at length for patient to avoid taking sildenafil during treatment and for 3 days after while taking Paxlovid as well as to hold on his statin while taking the medication.  Otherwise, recommended supportive care and no other signs of acute infection that indicated antibiotics at this time.      Patient received fluids and his morning diabetes medications with improvement of his fingerstick glucose.  Patient will need to follow-up with PCP for further management, return precautions discussed, such as worsening shortness of breath, new fevers.  Patient and fiancé at bedside voiced understanding agrees with plan.  All questions and concerns addressed at this time.       Medications   ketorolac (TORADOL) injection 15 mg (15 mg Intravenous Given 2/21/25 0741)   sodium  "chloride 0.9 % bolus 1,000 mL (0 mL Intravenous Stopped 2/21/25 0566)   glipiZIDE (GLUCOTROL XL) 24 hr tablet 10 mg (10 mg Oral Given 2/21/25 3363)   metFORMIN (GLUCOPHAGE-XR) 24 hr tablet 1,000 mg (1,000 mg Oral Given 2/21/25 8625)       ED Risk Strat Scores                                                History of Present Illness       Chief Complaint   Patient presents with    Nasal Congestion     Pt complaining of nasal congestion, and cough that started last night had to Vaccine over the weekend. No chest, SOB, no N/V        Past Medical History:   Diagnosis Date    Cancer (HCC)     Thyroid cancer    Diabetes mellitus (HCC)     Disease of thyroid gland     Hyperlipidemia     Hypertension     Postoperative urinary retention     Seizures (HCC)     x1 at age of 7      Past Surgical History:   Procedure Laterality Date    NECK SURGERY  04/21/2017    \"fatty tumor removal\"    PA LAPAROSCOPY SURG CHOLECYSTECTOMY N/A 8/29/2022    Procedure: LAPAROSCOPIC CHOLECYSTECTOMY;  Surgeon: Dino Castellon MD;  Location:  MAIN OR;  Service: General    THYROIDECTOMY      WISDOM TOOTH EXTRACTION        Family History   Problem Relation Age of Onset    Diabetes type II Mother         MELLITUS    COPD Father     Cancer Father         MB 2/4/16    LYMPHOMA CANCER      Social History     Tobacco Use    Smoking status: Never    Smokeless tobacco: Never   Vaping Use    Vaping status: Never Used   Substance Use Topics    Alcohol use: Yes     Comment: less than 1 drink per week    Drug use: No      E-Cigarette/Vaping    E-Cigarette Use Never User       E-Cigarette/Vaping Substances    Nicotine No     THC No     CBD No     Flavoring No     Other No     Unknown No       I have reviewed and agree with the history as documented.     HPI  Patient is a 49-year-old male presenting for flulike symptoms for the past 2 days.  Patient reports nasal congestion, sore throat, dry cough with some sputum production, subjective fevers.  Denies sick " contacts, but works 2 jobs. Got pneumonia and hepatitis vaccinations over the weekend.  Denies chest pain, shortness of breath.     Also was seen 2/10, dx w/ otitis media of L ear, took 7-8 doses of Amoxicillin, but stopped due to symptoms resolving.    Review of Systems  As per HPI      Objective       ED Triage Vitals   Temperature Pulse Blood Pressure Respirations SpO2 Patient Position - Orthostatic VS   02/21/25 0702 02/21/25 0658 02/21/25 0658 02/21/25 0658 02/21/25 0658 02/21/25 0658   100.1 °F (37.8 °C) (!) 115 148/88 22 95 % Lying      Temp Source Heart Rate Source BP Location FiO2 (%) Pain Score    02/21/25 0702 02/21/25 0658 02/21/25 0658 -- 02/21/25 0741    Oral Monitor Right arm  4      Vitals      Date and Time Temp Pulse SpO2 Resp BP Pain Score FACES Pain Rating User   02/21/25 0858 -- 100 95 % 18 -- -- -- CS   02/21/25 0745 -- 102 94 % -- -- -- -- CS   02/21/25 0741 -- -- -- -- -- 4 -- CS   02/21/25 0702 100.1 °F (37.8 °C) -- -- -- -- -- -- CS   02/21/25 0658 -- 115 95 % 22 148/88 -- -- JR            Physical Exam  Vitals and nursing note reviewed.   Constitutional:       Appearance: Normal appearance. He is not ill-appearing, toxic-appearing or diaphoretic.      Comments: Appears uncomfortable   HENT:      Head: Normocephalic and atraumatic.      Right Ear: Tympanic membrane is not erythematous or bulging.      Left Ear: Tympanic membrane is erythematous. Tympanic membrane is not bulging.      Nose: Nose normal.   Eyes:      Extraocular Movements: Extraocular movements intact.      Conjunctiva/sclera: Conjunctivae normal.   Cardiovascular:      Rate and Rhythm: Normal rate and regular rhythm.      Pulses: Normal pulses.      Heart sounds: No murmur heard.     No friction rub. No gallop.   Pulmonary:      Effort: Pulmonary effort is normal. No respiratory distress.      Breath sounds: Normal breath sounds.   Abdominal:      General: Bowel sounds are normal.      Palpations: Abdomen is soft.       Tenderness: There is no abdominal tenderness. There is no guarding or rebound.   Musculoskeletal:         General: No swelling or tenderness. Normal range of motion.      Cervical back: Normal range of motion. No rigidity or tenderness.   Skin:     General: Skin is warm and dry.      Capillary Refill: Capillary refill takes less than 2 seconds.   Neurological:      Mental Status: He is alert and oriented to person, place, and time.      Cranial Nerves: No cranial nerve deficit.      Sensory: No sensory deficit.      Motor: No weakness.         Results Reviewed       Procedure Component Value Units Date/Time    Fingerstick Glucose (POCT) [[id number]]  (Abnormal) Collected: 02/21/25 0929    Lab Status: Final result Specimen: Blood Updated: 02/21/25 0930     POC Glucose 191 mg/dl     FLU/COVID Rapid Antigen (30 min. TAT) - Preferred screening test in ED [[id number]]  (Abnormal) Collected: 02/21/25 0857    Lab Status: Final result Specimen: Nares from Nose Updated: 02/21/25 0921     SARS COV Rapid Antigen Positive     Influenza A Rapid Antigen Negative     Influenza B Rapid Antigen Negative    Narrative:      This test has been performed using the Quidel Mariia 2 FLU+SARS Antigen test under the Emergency Use Authorization (EUA). This test has been validated by the  and verified by the performing laboratory. The Mariia uses lateral flow immunofluorescent sandwich assay to detect SARS-COV, Influenza A and Influenza B Antigen.     The Quidel Mariia 2 SARS Antigen test does not differentiate between SARS-CoV and SARS-CoV-2.     Negative results are presumptive and may be confirmed with a molecular assay, if necessary, for patient management. Negative results do not rule out SARS-CoV-2 or influenza infection and should not be used as the sole basis for treatment or patient management decisions. A negative test result may occur if the level of antigen in a sample is below the limit of detection of this test.      Positive results are indicative of the presence of viral antigens, but do not rule out bacterial infection or co-infection with other viruses.     All test results should be used as an adjunct to clinical observations and other information available to the provider.    FOR PEDIATRIC PATIENTS - copy/paste COVID Guidelines URL to browser: [email or web address]    Comprehensive metabolic panel [[id number]]  (Abnormal) Collected: 02/21/25 0739    Lab Status: Final result Specimen: Blood from Arm, Left Updated: 02/21/25 0809     Sodium 136 mmol/L      Potassium 3.8 mmol/L      Chloride 105 mmol/L      CO2 25 mmol/L      ANION GAP 6 mmol/L      BUN 14 mg/dL      Creatinine 0.85 mg/dL      Glucose 288 mg/dL      Calcium 8.2 mg/dL      AST 11 U/L      ALT 18 U/L      Alkaline Phosphatase 71 U/L      Total Protein 6.9 g/dL      Albumin 4.2 g/dL      Total Bilirubin 1.63 mg/dL      eGFR 101 ml/min/1.73sq m     Narrative:      National Kidney Disease Foundation guidelines for Chronic Kidney Disease (CKD):     Stage 1 with normal or high GFR (GFR > 90 mL/min/1.73 square meters)    Stage 2 Mild CKD (GFR = 60-89 mL/min/1.73 square meters)    Stage 3A Moderate CKD (GFR = 45-59 mL/min/1.73 square meters)    Stage 3B Moderate CKD (GFR = 30-44 mL/min/1.73 square meters)    Stage 4 Severe CKD (GFR = 15-29 mL/min/1.73 square meters)    Stage 5 End Stage CKD (GFR <15 mL/min/1.73 square meters)  Note: GFR calculation is accurate only with a steady state creatinine    CBC and differential [[id number]]  (Abnormal) Collected: 02/21/25 0739    Lab Status: Final result Specimen: Blood from Arm, Left Updated: 02/21/25 0752     WBC 4.17 Thousand/uL      RBC 5.35 Million/uL      Hemoglobin 15.9 g/dL      Hematocrit 44.6 %      MCV 83 fL      MCH 29.7 pg      MCHC 35.7 g/dL      RDW 13.0 %      MPV 9.2 fL      Platelets 122 Thousands/uL      nRBC 0 /100 WBCs      Segmented % 85 %      Immature  Grans % 1 %      Lymphocytes % 7 %      Monocytes % 5 %      Eosinophils Relative 1 %      Basophils Relative 1 %      Absolute Neutrophils 3.59 Thousands/µL      Absolute Immature Grans 0.03 Thousand/uL      Absolute Lymphocytes 0.29 Thousands/µL      Absolute Monocytes 0.19 Thousand/µL      Eosinophils Absolute 0.04 Thousand/µL      Basophils Absolute 0.03 Thousands/µL     Fingerstick Glucose (POCT) [[id number]]  (Abnormal) Collected: 25    Lab Status: Final result Specimen: Blood Updated: 25     POC Glucose 309 mg/dl             XR chest 2 views   Final Interpretation by Raghav Gonzalez MD ([id number])      No acute cardiopulmonary disease.            Workstation performed: [id number]             Procedures    ED Medication and Procedure Management   Prior to Admission Medications   Prescriptions Last Dose Informant Patient Reported? Taking?   Dapagliflozin Pro-metFORMIN ER (Xigduo XR)  MG TB24   No No   Sig: TAKE 1 TABLET BY MOUTH EVERY DAY IN THE MORNING   amoxicillin (AMOXIL) 875 mg tablet   No No   Sig: Take 1 tablet (875 mg total) by mouth 2 (two) times a day for 10 days   atorvastatin (LIPITOR) 20 mg tablet   No No   Sig: TAKE 1 TABLET BY MOUTH DAILY AT BEDTIME   benazepril (LOTENSIN) 20 mg tablet   No No   Sig: TAKE 1/2 TABLET BY MOUTH DAILY   fluticasone (FLONASE) 50 mcg/act nasal spray   No No   Si spray into each nostril daily   glipiZIDE (GLUCOTROL XL) 10 mg 24 hr tablet   No No   Sig: TAKE 1 TABLET BY MOUTH EVERY DAY   levothyroxine 200 mcg tablet   No No   Sig: TAKE 1 TABLET BY MOUTH EVERY DAY   levothyroxine 25 mcg tablet   No No   Sig: Take 1 tablet (25 mcg total) by mouth daily   sildenafil (VIAGRA) 100 mg tablet   No No   Sig: Take 1 tablet (100 mg total) by mouth daily as needed for erectile dysfunction      Facility-Administered Medications: None     Discharge Medication List as of 2025 10:30 AM        START taking these medications    Details    nirmatrelvir & ritonavir (Paxlovid, 300/100,) tablet therapy pack Take 3 tablets by mouth 2 (two) times a day for 5 days Take 2 nirmatrelvir tablets + 1 ritonavir tablet together per dose, Starting Fri 2/21/2025, Until Wed 2/26/2025, Normal           CONTINUE these medications which have NOT CHANGED    Details   atorvastatin (LIPITOR) 20 mg tablet TAKE 1 TABLET BY MOUTH DAILY AT BEDTIME, Starting Sun 7/28/2024, Normal      benazepril (LOTENSIN) 20 mg tablet TAKE 1/2 TABLET BY MOUTH DAILY, Starting Wed 11/27/2024, Normal      Dapagliflozin Pro-metFORMIN ER (Xigduo XR)  MG TB24 TAKE 1 TABLET BY MOUTH EVERY DAY IN THE MORNING, Starting Tue 7/9/2024, Normal      fluticasone (FLONASE) 50 mcg/act nasal spray 1 spray into each nostril daily, Starting Tue 7/30/2024, Normal      glipiZIDE (GLUCOTROL XL) 10 mg 24 hr tablet TAKE 1 TABLET BY MOUTH EVERY DAY, Starting Mon 11/25/2024, Normal      !! levothyroxine 200 mcg tablet TAKE 1 TABLET BY MOUTH EVERY DAY, Starting Wed 11/27/2024, Normal      !! levothyroxine 25 mcg tablet Take 1 tablet (25 mcg total) by mouth daily, Starting Fri 9/13/2024, Normal      sildenafil (VIAGRA) 100 mg tablet Take 1 tablet (100 mg total) by mouth daily as needed for erectile dysfunction, Starting Fri 12/1/2023, Normal       !! - Potential duplicate medications found. Please discuss with provider.        STOP taking these medications       amoxicillin (AMOXIL) 875 mg tablet Comments:   Reason for Stopping:             No discharge procedures on file.  ED SEPSIS DOCUMENTATION   Time reflects when diagnosis was documented in both MDM as applicable and the Disposition within this note       Time User Action Codes Description Comment    2/21/2025 10:08 AM Hortencia Gutierrez [U07.1] COVID     2/21/2025 10:08 AM Hortencia Gutierrez [R73.9] Hyperglycemia                  Hortencia Gutierrez MD  02/21/25 8903

## 2025-02-21 NOTE — Clinical Note
Stefano Hassan was seen and treated in our emergency department on 2/21/2025.                Diagnosis:     Stefano  is off the rest of the shift today.    He may return on this date:     May return after 24 hours of fever free.     If you have any questions or concerns, please don't hesitate to call.      Hortencia Gutierrez MD    ______________________________           _______________          _______________  Hospital Representative                              Date                                Time

## 2025-02-25 DIAGNOSIS — E89.0 POSTOPERATIVE HYPOTHYROIDISM: ICD-10-CM

## 2025-02-25 DIAGNOSIS — E78.2 MIXED HYPERLIPIDEMIA: ICD-10-CM

## 2025-02-25 RX ORDER — LEVOTHYROXINE SODIUM 25 UG/1
25 TABLET ORAL DAILY
Qty: 90 TABLET | Refills: 1 | Status: SHIPPED | OUTPATIENT
Start: 2025-02-25

## 2025-02-25 RX ORDER — ATORVASTATIN CALCIUM 20 MG/1
20 TABLET, FILM COATED ORAL
Qty: 90 TABLET | Refills: 2 | Status: SHIPPED | OUTPATIENT
Start: 2025-02-25

## 2025-03-28 ENCOUNTER — APPOINTMENT (OUTPATIENT)
Dept: LAB | Facility: CLINIC | Age: 50
End: 2025-03-28
Payer: COMMERCIAL

## 2025-03-28 LAB
ALBUMIN SERPL BCG-MCNC: 4.5 G/DL (ref 3.5–5)
ALP SERPL-CCNC: 68 U/L (ref 34–104)
ALT SERPL W P-5'-P-CCNC: 15 U/L (ref 7–52)
ANION GAP SERPL CALCULATED.3IONS-SCNC: 10 MMOL/L (ref 4–13)
AST SERPL W P-5'-P-CCNC: 12 U/L (ref 13–39)
BASOPHILS # BLD AUTO: 0.07 THOUSANDS/ÂΜL (ref 0–0.1)
BASOPHILS NFR BLD AUTO: 1 % (ref 0–1)
BILIRUB SERPL-MCNC: 1.03 MG/DL (ref 0.2–1)
BUN SERPL-MCNC: 21 MG/DL (ref 5–25)
CALCIUM SERPL-MCNC: 8.8 MG/DL (ref 8.4–10.2)
CHLORIDE SERPL-SCNC: 103 MMOL/L (ref 96–108)
CHOLEST SERPL-MCNC: 139 MG/DL (ref ?–200)
CO2 SERPL-SCNC: 24 MMOL/L (ref 21–32)
CREAT SERPL-MCNC: 0.88 MG/DL (ref 0.6–1.3)
CREAT UR-MCNC: 70.7 MG/DL
EOSINOPHIL # BLD AUTO: 0.14 THOUSAND/ÂΜL (ref 0–0.61)
EOSINOPHIL NFR BLD AUTO: 3 % (ref 0–6)
ERYTHROCYTE [DISTWIDTH] IN BLOOD BY AUTOMATED COUNT: 13 % (ref 11.6–15.1)
EST. AVERAGE GLUCOSE BLD GHB EST-MCNC: 226 MG/DL
GFR SERPL CREATININE-BSD FRML MDRD: 100 ML/MIN/1.73SQ M
GLUCOSE P FAST SERPL-MCNC: 193 MG/DL (ref 65–99)
HBA1C MFR BLD: 9.5 %
HCT VFR BLD AUTO: 46.8 % (ref 36.5–49.3)
HDLC SERPL-MCNC: 33 MG/DL
HGB BLD-MCNC: 16.2 G/DL (ref 12–17)
IMM GRANULOCYTES # BLD AUTO: 0.06 THOUSAND/UL (ref 0–0.2)
IMM GRANULOCYTES NFR BLD AUTO: 1 % (ref 0–2)
LDLC SERPL CALC-MCNC: 76 MG/DL (ref 0–100)
LYMPHOCYTES # BLD AUTO: 1.27 THOUSANDS/ÂΜL (ref 0.6–4.47)
LYMPHOCYTES NFR BLD AUTO: 26 % (ref 14–44)
MCH RBC QN AUTO: 29.5 PG (ref 26.8–34.3)
MCHC RBC AUTO-ENTMCNC: 34.6 G/DL (ref 31.4–37.4)
MCV RBC AUTO: 85 FL (ref 82–98)
MICROALBUMIN UR-MCNC: <7 MG/L
MONOCYTES # BLD AUTO: 0.48 THOUSAND/ÂΜL (ref 0.17–1.22)
MONOCYTES NFR BLD AUTO: 10 % (ref 4–12)
NEUTROPHILS # BLD AUTO: 2.84 THOUSANDS/ÂΜL (ref 1.85–7.62)
NEUTS SEG NFR BLD AUTO: 59 % (ref 43–75)
NRBC BLD AUTO-RTO: 0 /100 WBCS
PLATELET # BLD AUTO: 176 THOUSANDS/UL (ref 149–390)
PMV BLD AUTO: 9.4 FL (ref 8.9–12.7)
POTASSIUM SERPL-SCNC: 4.2 MMOL/L (ref 3.5–5.3)
PROT SERPL-MCNC: 7.5 G/DL (ref 6.4–8.4)
PSA SERPL-MCNC: 0.59 NG/ML (ref 0–4)
RBC # BLD AUTO: 5.5 MILLION/UL (ref 3.88–5.62)
SODIUM SERPL-SCNC: 137 MMOL/L (ref 135–147)
TRIGL SERPL-MCNC: 151 MG/DL (ref ?–150)
TSH SERPL DL<=0.05 MIU/L-ACNC: 2.84 UIU/ML (ref 0.45–4.5)
WBC # BLD AUTO: 4.86 THOUSAND/UL (ref 4.31–10.16)

## 2025-03-31 ENCOUNTER — OFFICE VISIT (OUTPATIENT)
Dept: FAMILY MEDICINE CLINIC | Facility: CLINIC | Age: 50
End: 2025-03-31
Payer: COMMERCIAL

## 2025-03-31 VITALS
SYSTOLIC BLOOD PRESSURE: 126 MMHG | HEIGHT: 68 IN | TEMPERATURE: 98.1 F | OXYGEN SATURATION: 98 % | BODY MASS INDEX: 30.77 KG/M2 | DIASTOLIC BLOOD PRESSURE: 70 MMHG | RESPIRATION RATE: 16 BRPM | WEIGHT: 203 LBS | HEART RATE: 108 BPM

## 2025-03-31 DIAGNOSIS — E11.65 UNCONTROLLED TYPE 2 DIABETES MELLITUS WITH HYPERGLYCEMIA (HCC): Primary | ICD-10-CM

## 2025-03-31 DIAGNOSIS — E78.2 MIXED HYPERLIPIDEMIA: ICD-10-CM

## 2025-03-31 DIAGNOSIS — I10 ESSENTIAL HYPERTENSION: ICD-10-CM

## 2025-03-31 DIAGNOSIS — C73 MALIGNANT NEOPLASM OF THYROID GLAND (HCC): ICD-10-CM

## 2025-03-31 PROCEDURE — 99214 OFFICE O/P EST MOD 30 MIN: CPT | Performed by: FAMILY MEDICINE

## 2025-03-31 RX ORDER — PEN NEEDLE, DIABETIC 32GX 5/32"
NEEDLE, DISPOSABLE MISCELLANEOUS
Qty: 100 EACH | Refills: 3 | Status: SHIPPED | OUTPATIENT
Start: 2025-03-31

## 2025-03-31 RX ORDER — ACYCLOVIR 800 MG/1
1 TABLET ORAL
Qty: 6 EACH | Refills: 3 | Status: SHIPPED | OUTPATIENT
Start: 2025-03-31

## 2025-03-31 RX ORDER — KETOROLAC TROMETHAMINE 30 MG/ML
1 INJECTION, SOLUTION INTRAMUSCULAR; INTRAVENOUS ONCE
Qty: 1 EACH | Refills: 0 | Status: SHIPPED | OUTPATIENT
Start: 2025-03-31 | End: 2025-03-31

## 2025-03-31 RX ORDER — INSULIN DEGLUDEC 100 U/ML
12 INJECTION, SOLUTION SUBCUTANEOUS
Qty: 15 ML | Refills: 1 | Status: SHIPPED | OUTPATIENT
Start: 2025-03-31

## 2025-03-31 NOTE — PROGRESS NOTES
Name: Stefano Hassan      : 1975      MRN: [id number]  Encounter Provider: Marcus Castano MD  Encounter Date: 3/31/2025   Encounter department: [facility]  :  Assessment & Plan  Uncontrolled type 2 diabetes mellitus with hyperglycemia (HCC)  A1c increased to 9.5.  He stated he has been taking his medications as prescribed.  Has not been watching his diet very well.  I am going to add Tresiba 12 units at bedtime.  Discussed with patient to monitor his sugar at home.  I sent a prescription for continuous monitor.  It was discussed with patient if any low blood sugar measurement I will consider cutting down or stopping his Glucotrol.  He refuses to see endocrinologist.  Come back in 2 weeks for follow-up.  Lab Results   Component Value Date    HGBA1C 9.5 (H) 2025     Orders:  •  Continuous Glucose Sensor (FreeStyle Jose 3 Sensor) MISC; Use 1 each every 14 (fourteen) days  •  Continuous Glucose  (FreeStyle Jose 3 [place]) SARWAT; Use 1 each 1 (one) time for 1 dose  •  Insulin Pen Needle (BD Pen Needle Nadiya U/F) 32G X 4 MM MISC; Use daily as directed with insulin pen  •  insulin degludec (Tresiba FlexTouch) 100 units/mL injection pen; Inject 12 Units under the skin daily at bedtime    Malignant neoplasm of thyroid gland (HCC)  Stable.  Continue thyroid replacement treatment.         Essential hypertension  Well controlled on Lotensin 20 mg. Continue medication as prescribed. Will continue to monitor.         Mixed hyperlipidemia  Slightly elevated triglyceride but LDL is stable on atorvastatin.  Continue same and continue to follow with low-fat diet and regular exercise.                History of Present Illness   He is here today for follow-up multiple medical problems.  He has been taking his medications.  Denies any side effect to his medications.  He has not been watching his diet very well.  He had blood work done recently showed A1c very high at 9.5.  He claimed to  "take his medications as prescribed.  His triglycerides came back slightly elevated but the rest of the lipid profile is stable.    Diabetes  Pertinent negatives for hypoglycemia include no dizziness or headaches. Pertinent negatives for diabetes include no chest pain.     Review of Systems   Constitutional:  Negative for chills and fever.   HENT:  Negative for trouble swallowing.    Eyes:  Negative for visual disturbance.   Respiratory:  Negative for cough and shortness of breath.    Cardiovascular:  Negative for chest pain and palpitations.   Gastrointestinal:  Negative for abdominal pain, blood in stool and vomiting.   Endocrine: Negative for cold intolerance and heat intolerance.   Genitourinary:  Negative for difficulty urinating and dysuria.   Musculoskeletal:  Negative for gait problem.   Skin:  Negative for rash.   Neurological:  Negative for dizziness, syncope and headaches.   Hematological:  Negative for adenopathy.   Psychiatric/Behavioral:  Negative for behavioral problems.        Objective   /70 (BP Location: Left arm, Patient Position: Sitting, Cuff Size: Large)   Pulse (!) 108   Temp 98.1 °F (36.7 °C) (Tympanic)   Resp 16   Ht 5' 8\" (1.727 m)   Wt 92.1 kg (203 lb)   SpO2 98%   BMI 30.87 kg/m²      Physical Exam  Vitals and nursing note reviewed.   Constitutional:       Appearance: He is well-developed.   HENT:      Head: Normocephalic and atraumatic.   Eyes:      Pupils: Pupils are equal, round, and reactive to light.   Cardiovascular:      Rate and Rhythm: Normal rate and regular rhythm.      Heart sounds: Normal heart sounds.   Pulmonary:      Effort: Pulmonary effort is normal.      Breath sounds: Normal breath sounds.   Abdominal:      General: Bowel sounds are normal.      Palpations: Abdomen is soft.   Musculoskeletal:      Cervical back: Normal range of motion and neck supple.   Lymphadenopathy:      Cervical: No cervical adenopathy.   Skin:     General: Skin is warm.      Findings: " No rash.   Neurological:      Mental Status: He is alert and oriented to person, place, and time.

## 2025-03-31 NOTE — ASSESSMENT & PLAN NOTE
Well controlled on Lotensin 20 mg. Continue medication as prescribed. Will continue to monitor.

## 2025-03-31 NOTE — ASSESSMENT & PLAN NOTE
Slightly elevated triglyceride but LDL is stable on atorvastatin.  Continue same and continue to follow with low-fat diet and regular exercise.

## 2025-03-31 NOTE — ASSESSMENT & PLAN NOTE
A1c increased to 9.5.  He stated he has been taking his medications as prescribed.  Has not been watching his diet very well.  I am going to add Tresiba 12 units at bedtime.  Discussed with patient to monitor his sugar at home.  I sent a prescription for continuous monitor.  It was discussed with patient if any low blood sugar measurement I will consider cutting down or stopping his Glucotrol.  He refuses to see endocrinologist.  Come back in 2 weeks for follow-up.  Lab Results   Component Value Date    HGBA1C 9.5 (H) 03/28/2025     Orders:  •  Continuous Glucose Sensor (FreeStyle Jose 3 Sensor) MISC; Use 1 each every 14 (fourteen) days  •  Continuous Glucose  (FreeStyle Jose 3 [place]) SARWAT; Use 1 each 1 (one) time for 1 dose  •  Insulin Pen Needle (BD Pen Needle Nadiya U/F) 32G X 4 MM MISC; Use daily as directed with insulin pen  •  insulin degludec (Tresiba FlexTouch) 100 units/mL injection pen; Inject 12 Units under the skin daily at bedtime

## 2025-04-01 ENCOUNTER — TELEPHONE (OUTPATIENT)
Dept: FAMILY MEDICINE CLINIC | Facility: CLINIC | Age: 50
End: 2025-04-01

## 2025-04-01 ENCOUNTER — TELEPHONE (OUTPATIENT)
Age: 50
End: 2025-04-01

## 2025-04-01 NOTE — TELEPHONE ENCOUNTER
PA for Free Style Jose 3 Sensor SUBMITTED to [facility]    via    []CMM-KEY:   [x]Surescripts-Case ID #   []Availity-Auth ID # NDC #   []Faxed to plan   []Other website   []Phone call Case ID #     []PA sent as URGENT    All office notes, labs and other pertaining documents and studies sent. Clinical questions answered. Awaiting determination from insurance company.     Turnaround time for your insurance to make a decision on your Prior Authorization can take 7-21 business days.

## 2025-04-02 NOTE — TELEPHONE ENCOUNTER
PA for Freestyle Jose 3 Sensor  APPROVED     Date(s) approved 04/01/25-04/01/26    Case #    Patient advised by          []SelSaharahart Message  [x]Phone call   []LMOM  []L/M to call office as no active Communication consent on file  []Unable to leave detailed message as VM not approved on Communication consent       Pharmacy advised by    [x]Fax  []Phone call  []Secure Chat    Specialty Pharmacy    []       Approval letter scanned into Media

## 2025-04-14 DIAGNOSIS — E11.65 UNCONTROLLED TYPE 2 DIABETES MELLITUS WITH HYPERGLYCEMIA (HCC): ICD-10-CM

## 2025-04-16 RX ORDER — DAPAGLIFLOZIN AND METFORMIN HYDROCHLORIDE 10; 1000 MG/1; MG/1
1 TABLET, FILM COATED, EXTENDED RELEASE ORAL EVERY MORNING
Qty: 90 TABLET | Refills: 1 | Status: SHIPPED | OUTPATIENT
Start: 2025-04-16

## 2025-04-28 ENCOUNTER — OFFICE VISIT (OUTPATIENT)
Dept: FAMILY MEDICINE CLINIC | Facility: CLINIC | Age: 50
End: 2025-04-28
Payer: COMMERCIAL

## 2025-04-28 VITALS
BODY MASS INDEX: 30.89 KG/M2 | OXYGEN SATURATION: 98 % | TEMPERATURE: 98.3 F | RESPIRATION RATE: 16 BRPM | DIASTOLIC BLOOD PRESSURE: 80 MMHG | HEART RATE: 104 BPM | SYSTOLIC BLOOD PRESSURE: 128 MMHG | HEIGHT: 68 IN | WEIGHT: 203.8 LBS

## 2025-04-28 DIAGNOSIS — I10 ESSENTIAL HYPERTENSION: ICD-10-CM

## 2025-04-28 DIAGNOSIS — E11.65 UNCONTROLLED TYPE 2 DIABETES MELLITUS WITH HYPERGLYCEMIA (HCC): Primary | ICD-10-CM

## 2025-04-28 DIAGNOSIS — E89.0 POSTOPERATIVE HYPOTHYROIDISM: ICD-10-CM

## 2025-04-28 DIAGNOSIS — R05.3 CHRONIC COUGH: ICD-10-CM

## 2025-04-28 PROCEDURE — 99214 OFFICE O/P EST MOD 30 MIN: CPT | Performed by: FAMILY MEDICINE

## 2025-04-28 RX ORDER — LEVOTHYROXINE SODIUM 25 UG/1
25 TABLET ORAL DAILY
Qty: 90 TABLET | Refills: 1 | Status: SHIPPED | OUTPATIENT
Start: 2025-04-28

## 2025-04-28 RX ORDER — LOSARTAN POTASSIUM 50 MG/1
50 TABLET ORAL DAILY
Qty: 30 TABLET | Refills: 2 | Status: SHIPPED | OUTPATIENT
Start: 2025-04-28

## 2025-04-28 RX ORDER — KETOROLAC TROMETHAMINE 30 MG/ML
INJECTION, SOLUTION INTRAMUSCULAR; INTRAVENOUS
COMMUNITY
Start: 2025-04-02

## 2025-04-28 NOTE — ASSESSMENT & PLAN NOTE
TSH came back therapeutic.  Continue on levothyroxine to 225 mcg daily.  We will continue to monitor TSH and free T4.    SmartLink not supported outside of the Encounter Diagnoses SmartSection.

## 2025-04-28 NOTE — PROGRESS NOTES
Name: Stefano Hassan      : 1975      MRN: [id number]  Encounter Provider: Marcus Castano MD  Encounter Date: 2025   Encounter department: [facility]  :  Assessment & Plan  Uncontrolled type 2 diabetes mellitus with hyperglycemia (HCC)    Lab Results   Component Value Date    HGBA1C 9.5 (H) 2025   - Discontinue glipizide.   - Re-iterated importance of dietary modifications, especially increasing protein and vegetable consumption and decreasing carb consumption.   - Advised pt to try to eat the same types of things in a routine to help with regulating his blood sugar.   - Continue Tresiba 12 unit injections at night. Increase dose by 2 units if 2 consecutive AM glucose readings >140. Continue to increase dose incrementally by 2 units until fasting AM blood sugar is consistently under 140.   - Plan f/u in 1 month to re-evaluate.       Essential hypertension  - Discontinue benazepril (Lotensin) 20 mg PO daily due to report of dry cough.   - Initiate losartan (Cozaar) 50 mg PO daily   - Plan f/u 1 month   Orders:  •  losartan (COZAAR) 50 mg tablet; Take 1 tablet (50 mg total) by mouth daily    Chronic cough  I am going to stop benazepril.  Discussed with patient to increase oral hydration and use a humidifier.  If not improving I will consider referral to see ENT.              History of Present Illness   Stefano Hassan is a 48 y/o male with a PMH of HTN, type 2 DM, post-operative hypothyroidism and is presenting for 2 week follow up. He reports confusion about diabetes medication regimen and states he has been taking glipizide and Xigduo in the morning and Tresiba injections at night. He has been randomly adjusting Tresiba dose based upon what his glucometer reading shows.  Patient reports drastic fluctuations in sugar readings from 40 to over 200, and with no identifiable pattern. Blood pressure in the office today was 128/80. No updated labs available for review.  "Additional complaints include dry cough with associated scratchy throat and occassional hoarseness. Pt states he has been on ACE-inhibitor med for over 1 year. He has tried claritin, OTC congestion meds and Halls lozenges for his symptoms and nothing has helped.        Diabetes  Hypoglycemia symptoms include headaches and tremors. Pertinent negatives for hypoglycemia include no dizziness or seizures. Associated symptoms include fatigue. Pertinent negatives for diabetes include no chest pain.     Review of Systems   Constitutional:  Positive for fatigue. Negative for chills, diaphoresis and fever.   HENT:  Positive for rhinorrhea, sore throat and voice change. Negative for congestion and postnasal drip.    Eyes:  Negative for pain and visual disturbance.   Respiratory:  Negative for cough and shortness of breath.    Cardiovascular:  Negative for chest pain, palpitations and leg swelling.   Gastrointestinal:  Positive for diarrhea. Negative for abdominal pain, blood in stool, constipation, nausea and vomiting.   Genitourinary:  Negative for dysuria, frequency and hematuria.   Skin:  Negative for color change and rash.   Neurological:  Positive for tremors and headaches. Negative for dizziness, seizures, syncope and light-headedness.   All other systems reviewed and are negative.      Objective   /80 (BP Location: Left arm, Patient Position: Sitting, Cuff Size: Large)   Pulse 104   Temp 98.3 °F (36.8 °C) (Tympanic)   Resp 16   Ht 5' 8\" (1.727 m)   Wt 92.4 kg (203 lb 12.8 oz)   SpO2 98%   BMI 30.99 kg/m²      Physical Exam  Vitals and nursing note reviewed.   Constitutional:       General: He is not in acute distress.     Appearance: He is well-developed. He is obese.   HENT:      Head: Normocephalic and atraumatic.      Right Ear: Tympanic membrane, ear canal and external ear normal.      Left Ear: Tympanic membrane, ear canal and external ear normal.      Nose: Nose normal. No congestion or rhinorrhea.    "   Mouth/Throat:      Mouth: Mucous membranes are moist.      Pharynx: Posterior oropharyngeal erythema present. No oropharyngeal exudate.   Eyes:      Extraocular Movements: Extraocular movements intact.      Conjunctiva/sclera: Conjunctivae normal.   Cardiovascular:      Rate and Rhythm: Normal rate and regular rhythm.      Heart sounds: No murmur heard.  Pulmonary:      Effort: Pulmonary effort is normal. No respiratory distress.      Breath sounds: Normal breath sounds.   Musculoskeletal:         General: No swelling.      Cervical back: Neck supple.   Skin:     General: Skin is warm and dry.   Neurological:      Mental Status: He is alert.   Psychiatric:         Mood and Affect: Mood normal.

## 2025-04-28 NOTE — ASSESSMENT & PLAN NOTE
I am going to stop benazepril.  Discussed with patient to increase oral hydration and use a humidifier.  If not improving I will consider referral to see ENT.

## 2025-04-28 NOTE — ASSESSMENT & PLAN NOTE
Lab Results   Component Value Date    HGBA1C 9.5 (H) 03/28/2025   - Discontinue glipizide.   - Re-iterated importance of dietary modifications, especially increasing protein and vegetable consumption and decreasing carb consumption.   - Advised pt to try to eat the same types of things in a routine to help with regulating his blood sugar.   - Continue Tresiba 12 unit injections at night. Increase dose by 2 units if 2 consecutive AM glucose readings >140. Continue to increase dose incrementally by 2 units until fasting AM blood sugar is consistently under 140.   - Plan f/u in 1 month to re-evaluate.

## 2025-04-28 NOTE — ASSESSMENT & PLAN NOTE
- Discontinue benazepril (Lotensin) 20 mg PO daily due to report of dry cough.   - Initiate losartan (Cozaar) 50 mg PO daily   - Plan f/u 1 month   Orders:  •  losartan (COZAAR) 50 mg tablet; Take 1 tablet (50 mg total) by mouth daily

## 2025-04-29 DIAGNOSIS — E11.65 TYPE 2 DIABETES MELLITUS WITH HYPERGLYCEMIA (HCC): ICD-10-CM

## 2025-04-30 RX ORDER — KETOROLAC TROMETHAMINE 30 MG/ML
INJECTION, SOLUTION INTRAMUSCULAR; INTRAVENOUS
Qty: 1 EACH | OUTPATIENT
Start: 2025-04-30

## 2025-05-08 ENCOUNTER — APPOINTMENT (EMERGENCY)
Dept: CT IMAGING | Facility: HOSPITAL | Age: 50
End: 2025-05-08
Payer: COMMERCIAL

## 2025-05-08 ENCOUNTER — HOSPITAL ENCOUNTER (EMERGENCY)
Facility: HOSPITAL | Age: 50
Discharge: HOME | End: 2025-05-08
Attending: EMERGENCY MEDICINE
Payer: COMMERCIAL

## 2025-05-08 VITALS
TEMPERATURE: 97.7 F | SYSTOLIC BLOOD PRESSURE: 113 MMHG | DIASTOLIC BLOOD PRESSURE: 63 MMHG | OXYGEN SATURATION: 98 % | HEART RATE: 94 BPM | RESPIRATION RATE: 18 BRPM

## 2025-05-08 DIAGNOSIS — E11.65 HYPERGLYCEMIA DUE TO DIABETES MELLITUS (HCC): ICD-10-CM

## 2025-05-08 DIAGNOSIS — L03.213 PRESEPTAL CELLULITIS OF RIGHT EYE: Primary | ICD-10-CM

## 2025-05-08 LAB
ALBUMIN SERPL BCG-MCNC: 4.4 G/DL (ref 3.5–5)
ALP SERPL-CCNC: 69 U/L (ref 34–104)
ALT SERPL W P-5'-P-CCNC: 20 U/L (ref 7–52)
ANION GAP SERPL CALCULATED.3IONS-SCNC: 9 MMOL/L (ref 4–13)
AST SERPL W P-5'-P-CCNC: 14 U/L (ref 13–39)
BASOPHILS # BLD AUTO: 0.06 THOUSANDS/ÂΜL (ref 0–0.1)
BASOPHILS NFR BLD AUTO: 1 % (ref 0–1)
BILIRUB SERPL-MCNC: 1.07 MG/DL (ref 0.2–1)
BUN SERPL-MCNC: 19 MG/DL (ref 5–25)
CALCIUM SERPL-MCNC: 8.7 MG/DL (ref 8.4–10.2)
CHLORIDE SERPL-SCNC: 104 MMOL/L (ref 96–108)
CO2 SERPL-SCNC: 25 MMOL/L (ref 21–32)
CREAT SERPL-MCNC: 0.91 MG/DL (ref 0.6–1.3)
EOSINOPHIL # BLD AUTO: 0.12 THOUSAND/ÂΜL (ref 0–0.61)
EOSINOPHIL NFR BLD AUTO: 3 % (ref 0–6)
ERYTHROCYTE [DISTWIDTH] IN BLOOD BY AUTOMATED COUNT: 12.5 % (ref 11.6–15.1)
GFR SERPL CREATININE-BSD FRML MDRD: 97 ML/MIN/1.73SQ M
GLUCOSE SERPL-MCNC: 270 MG/DL (ref 65–140)
HCT VFR BLD AUTO: 45.5 % (ref 36.5–49.3)
HGB BLD-MCNC: 16 G/DL (ref 12–17)
IMM GRANULOCYTES # BLD AUTO: 0.03 THOUSAND/UL (ref 0–0.2)
IMM GRANULOCYTES NFR BLD AUTO: 1 % (ref 0–2)
LYMPHOCYTES # BLD AUTO: 0.9 THOUSANDS/ÂΜL (ref 0.6–4.47)
LYMPHOCYTES NFR BLD AUTO: 19 % (ref 14–44)
MCH RBC QN AUTO: 29.6 PG (ref 26.8–34.3)
MCHC RBC AUTO-ENTMCNC: 35.2 G/DL (ref 31.4–37.4)
MCV RBC AUTO: 84 FL (ref 82–98)
MONOCYTES # BLD AUTO: 0.32 THOUSAND/ÂΜL (ref 0.17–1.22)
MONOCYTES NFR BLD AUTO: 7 % (ref 4–12)
NEUTROPHILS # BLD AUTO: 3.34 THOUSANDS/ÂΜL (ref 1.85–7.62)
NEUTS SEG NFR BLD AUTO: 69 % (ref 43–75)
NRBC BLD AUTO-RTO: 0 /100 WBCS
PLATELET # BLD AUTO: 189 THOUSANDS/UL (ref 149–390)
PMV BLD AUTO: 9 FL (ref 8.9–12.7)
POTASSIUM SERPL-SCNC: 4.1 MMOL/L (ref 3.5–5.3)
PROT SERPL-MCNC: 7.2 G/DL (ref 6.4–8.4)
RBC # BLD AUTO: 5.41 MILLION/UL (ref 3.88–5.62)
SODIUM SERPL-SCNC: 138 MMOL/L (ref 135–147)
WBC # BLD AUTO: 4.77 THOUSAND/UL (ref 4.31–10.16)

## 2025-05-08 PROCEDURE — 99283 EMERGENCY DEPT VISIT LOW MDM: CPT

## 2025-05-08 PROCEDURE — 70481 CT ORBIT/EAR/FOSSA W/DYE: CPT

## 2025-05-08 PROCEDURE — 80053 COMPREHEN METABOLIC PANEL: CPT | Performed by: EMERGENCY MEDICINE

## 2025-05-08 PROCEDURE — 99285 EMERGENCY DEPT VISIT HI MDM: CPT | Performed by: EMERGENCY MEDICINE

## 2025-05-08 PROCEDURE — 85025 COMPLETE CBC W/AUTO DIFF WBC: CPT | Performed by: EMERGENCY MEDICINE

## 2025-05-08 PROCEDURE — 96374 THER/PROPH/DIAG INJ IV PUSH: CPT

## 2025-05-08 PROCEDURE — 36415 COLL VENOUS BLD VENIPUNCTURE: CPT | Performed by: EMERGENCY MEDICINE

## 2025-05-08 RX ORDER — ACETAMINOPHEN 325 MG/1
650 TABLET ORAL ONCE
Status: COMPLETED | OUTPATIENT
Start: 2025-05-08 | End: 2025-05-08

## 2025-05-08 RX ORDER — TETRACAINE HYDROCHLORIDE 5 MG/ML
1 SOLUTION OPHTHALMIC ONCE
Status: COMPLETED | OUTPATIENT
Start: 2025-05-08 | End: 2025-05-08

## 2025-05-08 RX ORDER — KETOROLAC TROMETHAMINE 30 MG/ML
15 INJECTION, SOLUTION INTRAMUSCULAR; INTRAVENOUS ONCE
Status: COMPLETED | OUTPATIENT
Start: 2025-05-08 | End: 2025-05-08

## 2025-05-08 RX ORDER — LORATADINE 10 MG/1
10 TABLET ORAL ONCE
Status: COMPLETED | OUTPATIENT
Start: 2025-05-08 | End: 2025-05-08

## 2025-05-08 RX ADMIN — IOHEXOL 85 ML: 350 INJECTION, SOLUTION INTRAVENOUS at 13:15

## 2025-05-08 RX ADMIN — LORATADINE 10 MG: 10 TABLET ORAL at 12:35

## 2025-05-08 RX ADMIN — TETRACAINE HYDROCHLORIDE 1 DROP: 5 SOLUTION OPHTHALMIC at 12:20

## 2025-05-08 RX ADMIN — AMOXICILLIN AND CLAVULANATE POTASSIUM 1 TABLET: 875; 125 TABLET, FILM COATED ORAL at 13:55

## 2025-05-08 RX ADMIN — FLUORESCEIN SODIUM 1 STRIP: 1 STRIP OPHTHALMIC at 12:20

## 2025-05-08 RX ADMIN — KETOROLAC TROMETHAMINE 15 MG: 30 INJECTION, SOLUTION INTRAMUSCULAR; INTRAVENOUS at 13:55

## 2025-05-08 RX ADMIN — ACETAMINOPHEN 650 MG: 325 TABLET, FILM COATED ORAL at 13:43

## 2025-05-08 NOTE — Clinical Note
Stefano Hassan was seen and treated in our emergency department on 5/8/2025.                Diagnosis:     Stefano  is off the rest of the shift today.    He may return on this date:          If you have any questions or concerns, please don't hesitate to call.      Markell Andersen MD    ______________________________           _______________          _______________  Hospital Representative                              Date                                Time

## 2025-05-08 NOTE — DISCHARGE INSTRUCTIONS
You were evaluated in the emergency department for: eye/facial swelling. You can access your current and pending results through [facility]. A radiologist will take a second look at your X-Rays, if you had any, and you will be contacted with any new findings.     You should follow-up with your primary care provider, as soon as possible, for re-evaluation.  If you do not have a primary care provider, I have referred you to [facility]. You will be contacted about scheduling an appointment. Their phone number is also included on this paperwork.  You have also been referred to ophthalmology and you should follow-up with them as well.    Your workup revealed no emergent features at this time; however, many disease processes are dynamic:    Please, return to the emergency department if you experience new or worsening symptoms, fever, chest pain, shortness of breath, difficulty breathing, dizziness, abdominal pain, persistent nausea/vomiting, syncope or passing out, blood in your urine or stool, coughing up blood, leg swelling/pain, urinary retention, bowel or bladder incontinence, numbness between your legs.    You may take 650mg of tylenol every four to six hours, not exceeding 3,000mg daily, for the management of your discomfort. You may also take ibuprofen, 400mg every six to eight hours.

## 2025-05-08 NOTE — ED PROVIDER NOTES
HBO Treatment Course Details       Treatment Notes: Pt tolerated HBOT well and offered no complaints.     Treatment Course Number: 24  Total Treatments Ordered:  40     Diagnosis:   1. Radiation cystitis  Hyperbaric oxygen thearpy      2. Malignant neoplasm of overlapping sites of cervix (HCC)  Hyperbaric oxygen thearpy      3. History of radiation therapy  Hyperbaric oxygen thearpy          HBO Treatment Details:  In-Patient Visit: no  Treatment Length:90 Minutes(Minutes)  Chamber #: Hard sided Monoplace Chamber    Pre-Treatment details:  Pre-treatment protocol Treatment Protocol: 2.5 LILIAN X 90 minutes w/ 100% oxygen, two 5 minute air breaks  Left ear clear?: yes  Right ear clear?: yes  Left ear intact?: yes  Right ear intact?: yes        PE Tubes present, Left ear?: no  PE Tubes present, Right ear?: no  Left ear irrigated?: no  Right ear irrigated?: no  Left ear TEED scale: Grade 0  Right ear TEED scale: Grade 0   Pretreatment heart and lung assessment: Pretreatment heart and lung auscltation unremarkable. Patient cleared for HBOT     Treatment details:  LILIAN Rate: 2.5  Started Compression: 1358  Reached Compression: 1411  Total Compression Time: 13 (Minutes)  Total Holding Time: 100 (Minutes)  Started Decompression: 1551  Reached Surface: 1603  Total Decompression Time: 12 (Minutes)  Total Airbreaks:   (Minutes)  Total Time of Treatment: 125 (Minutes)  Symptoms Noted During Treatment: None (Minutes)    Post treatment details:  Left ear clear?: yes  Right ear clear?: yes  Left ears intact?: yes  Right ears intact?: yes        PE Tubes present, Left ear?: no  PE Tubes present, Right ear?: no        Left ear TEED scale: Grade 0  Right ear TEED scale: Grade 0  Post treatment heart and lung assessment: Post treatment heart and lung auscltation unremarkable. Patient cleared for discharge. Tolerated treatment well.  HBO Supervision: Hbo supervised. I was immediately available throughout the entire procedure. Tolerated  Time reflects when diagnosis was documented in both MDM as applicable and the Disposition within this note       Time User Action Codes Description Comment    5/8/2025  1:01 PM Markell Andersen Add [L03.213] Preseptal cellulitis of right eye     5/8/2025  1:01 PM Arminpatricio Tallouisa GRIFFIN Add [E11.65] Hyperglycemia due to diabetes mellitus (HCC)           ED Disposition       ED Disposition   Discharge    Condition   Stable    Date/Time   Thu May 8, 2025  2:06 PM    Comment   Stefano Hassan discharge to home/self care.                   Assessment & Plan       Medical Decision Making  Patient is a 49-year-old male, with a history significant for uncontrolled diabetes per my review the medical record, who presents to the ED today for evaluation of atraumatic, gradual onset, constant, worsening, itching/burning in character right eye pain.  There is associated redness and swelling primarily of the eyelid.  Patient denies contact use, getting anything in his eye such as paint (patient is employed as a ), fever, chest pain, dyspnea, abdominal pain, urinary symptoms.  Patient applied clear eyedrops to try and remit his symptoms.  Eye movement and light exacerbate his discomfort.  Patient is currently afebrile and hemodynamically stable.  Physical exam is notable for no overlying skin defect but erythema/swelling of the right eyelid, no gross conjunctival injection, pain with extraocular movements, no proptosis, no abnormal fluorescein uptake, clear heart and lungs, soft nontender abdomen.  This presentation is concerning for: Allergic conjunctivitis, local irritation.  Iritis also considered.  Patient at risk for preseptal cellulitis and septal cellulitis given uncontrolled diabetes and orbital swelling/erythema.  No reason to suspect globe rupture.  Will investigate with CBC, CMP, CT orbit.  Will manage with antihistamine and further based upon workup.  Will refer to ophthalmology.    Amount and/or Complexity of  Data Reviewed  Labs: ordered. Decision-making details documented in ED Course.  Radiology: ordered.    Risk  OTC drugs.  Prescription drug management.        ED Course as of 05/08/25 1413   Thu May 08, 2025   1248 WBC: 4.77  WNL - patient does not meet SIRS    1300 GLUCOSE(!): 270  Elevated, similar to prior   1300 ANION GAP: 9  WNL    1346 On reevaluation, facial swelling not grossly worsened   1413 Patient has neither questions nor concerns after receiving discharge instructions and return precautions        Medications   fluorescein sodium sterile ophthalmic strip 1 strip (1 strip Right Eye Given by Other 5/8/25 1220)   tetracaine 0.5 % ophthalmic solution 1 drop (1 drop Right Eye Given by Other 5/8/25 1220)   loratadine (CLARITIN) tablet 10 mg (10 mg Oral Given 5/8/25 1235)   acetaminophen (TYLENOL) tablet 650 mg (650 mg Oral Given 5/8/25 1343)   iohexol (OMNIPAQUE) 350 MG/ML injection (MULTI-DOSE) 85 mL (85 mL Intravenous Given 5/8/25 1315)   ketorolac (TORADOL) injection 15 mg (15 mg Intravenous Given 5/8/25 1355)   amoxicillin-clavulanate (AUGMENTIN) 875-125 mg per tablet 1 tablet (1 tablet Oral Given 5/8/25 1355)       ED Risk Strat Scores                    No data recorded        SBIRT 20yo+      Flowsheet Row Most Recent Value   Initial Alcohol Screen: US AUDIT-C     1. How often do you have a drink containing alcohol? 0 Filed at: 05/08/2025 1213   2. How many drinks containing alcohol do you have on a typical day you are drinking?  0 Filed at: 05/08/2025 1213   3a. Male UNDER 65: How often do you have five or more drinks on one occasion? 0 Filed at: 05/08/2025 1213   3b. FEMALE Any Age, or MALE 65+: How often do you have 4 or more drinks on one occassion? 0 Filed at: 05/08/2025 1213   Audit-C Score 0 Filed at: 05/08/2025 1213   JULIA: How many times in the past year have you...    Used an illegal drug or used a prescription medication for non-medical reasons? Never Filed at: 05/08/2025 1213       well.          Vital Signs:  HBO Glucose Reference Range: 100-350 mg/dl   Pre-Treatment Post-Treatment   Time vitals are taken: 1350 Time vitals are taken: 1605   Blood Pressure: 132/70 Blood Pressure: 138/74   Pulse: 70 Pulse: 76   Resp: 16 Resp: 18   Temp: 98.2 °F (36.8 °C) Temp: 97.3 °F (36.3 °C)             No Known Allergies  Patient Active Problem List    Diagnosis Date Noted    Palliative care encounter 02/10/2025    Cancer related pain 02/08/2025    Constipation 02/08/2025    Leukocytosis 02/08/2025    Suprapubic pain 02/06/2025    Projectile vomiting with nausea 07/09/2024    Anemia due to chemotherapy 02/20/2024    Hypomagnesemia 02/05/2024    Cervical cancer (HCC) 01/15/2024    Vaginal bleeding 01/09/2024    Atypical squamous cells of undetermined significance on cytologic smear of cervix (ASC-US) 01/09/2024    ABLA (acute blood loss anemia) 01/09/2024    Tenosynovitis 03/10/2021    Hypothyroidism 03/10/2021    Class 1 obesity due to excess calories without serious comorbidity with body mass index (BMI) of 34.0 to 34.9 in adult 03/10/2021    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     "                      History of Present Illness       Chief Complaint   Patient presents with    Eye Swelling     Right eye swelling and redness since last night. Expresses no vision changes but itchiness to eye       Past Medical History:   Diagnosis Date    Cancer (HCC)     Thyroid cancer    Diabetes mellitus (HCC)     Disease of thyroid gland     Hyperlipidemia     Hypertension     Postoperative urinary retention     Seizures (HCC)     x1 at age of 7      Past Surgical History:   Procedure Laterality Date    NECK SURGERY  04/21/2017    \"fatty tumor removal\"    DE LAPAROSCOPY SURG CHOLECYSTECTOMY N/A 8/29/2022    Procedure: LAPAROSCOPIC CHOLECYSTECTOMY;  Surgeon: Dino Castellon MD;  Location:  MAIN OR;  Service: General    THYROIDECTOMY      WISDOM TOOTH EXTRACTION        Family History   Problem Relation Age of Onset    Diabetes type II Mother         MELLITUS    COPD Father     Cancer Father         MB 2/4/16    LYMPHOMA CANCER      Social History     Tobacco Use    Smoking status: Never    Smokeless tobacco: Never   Vaping Use    Vaping status: Never Used   Substance Use Topics    Alcohol use: Yes     Comment: less than 1 drink per week    Drug use: No      E-Cigarette/Vaping    E-Cigarette Use Never User       E-Cigarette/Vaping Substances    Nicotine No     THC No     CBD No     Flavoring No     Other No     Unknown No       I have reviewed and agree with the history as documented.     Patient is a 49-year-old male, with a history significant for uncontrolled diabetes per my review the medical record, who presents to the ED today for evaluation of atraumatic, gradual onset, constant, worsening, itching/burning in character right eye pain.  There is associated redness and swelling primarily of the eyelid.  Patient denies contact use, getting anything in his eye such as paint (patient is employed as a ), fever, chest pain, dyspnea, abdominal pain, urinary symptoms.  Patient applied clear eyedrops to " try and remit his symptoms.  Eye movement and light exacerbate his discomfort.  Patient is without other concerns at this time.        Review of Systems   Constitutional:  Negative for fever.   HENT:  Negative for trouble swallowing.    Eyes:  Positive for photophobia, pain and itching. Negative for visual disturbance.   Respiratory:  Negative for shortness of breath.    Cardiovascular:  Negative for chest pain.   Gastrointestinal:  Negative for abdominal pain.   Endocrine: Negative for polyuria.   Genitourinary:  Negative for dysuria.   Musculoskeletal:  Negative for gait problem.   Skin:  Negative for rash.   Allergic/Immunologic: Negative for environmental allergies.   Neurological:  Negative for weakness and numbness.   Hematological:  Negative for adenopathy.   Psychiatric/Behavioral:  Negative for confusion.    All other systems reviewed and are negative.          Objective       ED Triage Vitals   Temperature Pulse Blood Pressure Respirations SpO2 Patient Position - Orthostatic VS   05/08/25 1212 05/08/25 1212 05/08/25 1212 05/08/25 1212 05/08/25 1212 05/08/25 1212   97.7 °F (36.5 °C) 94 113/63 18 98 % Sitting      Temp Source Heart Rate Source BP Location FiO2 (%) Pain Score    05/08/25 1212 05/08/25 1212 05/08/25 1212 -- 05/08/25 1343    Oral Monitor Right arm  5      Vitals      Date and Time Temp Pulse SpO2 Resp BP Pain Score FACES Pain Rating User   05/08/25 1343 -- -- -- -- -- 5 -- KL   05/08/25 1212 97.7 °F (36.5 °C) 94 98 % 18 113/63 -- -- KB            Physical Exam  Vitals and nursing note reviewed.   Constitutional:       General: He is not in acute distress.     Appearance: He is not toxic-appearing.      Comments: Patient appears comfortable during my evaluation    HENT:      Head: Normocephalic and atraumatic.      Right Ear: External ear normal.      Left Ear: External ear normal.      Nose: Nose normal. No rhinorrhea.      Mouth/Throat:      Mouth: Mucous membranes are moist.      Pharynx:  Oropharynx is clear. No oropharyngeal exudate or posterior oropharyngeal erythema.      Comments: Uvula midline. No oropharyngeal or submandibular mass/swelling  Eyes:      General: No scleral icterus.        Right eye: No discharge.         Left eye: No discharge.      Extraocular Movements: Extraocular movements intact.      Conjunctiva/sclera: Conjunctivae normal.      Pupils: Pupils are equal, round, and reactive to light.      Comments: Swelling/erythema of the right upper orbit.  Pain with extraocular movements of the right.  Pupil normal.  Negative Rosendo.  No abnormal fluorescein uptake.  Relief of pain with tetracaine   Neck:      Comments: Patient is spontaneously rotating their neck to the left and right during the history and physical exam interaction without difficulty or apparent discomfort    Cardiovascular:      Rate and Rhythm: Normal rate and regular rhythm.      Pulses: Normal pulses.      Heart sounds: Normal heart sounds. No murmur heard.     No friction rub. No gallop.      Comments: 2+ Radial  Pulmonary:      Effort: Pulmonary effort is normal. No respiratory distress.      Breath sounds: Normal breath sounds. No stridor. No wheezing, rhonchi or rales.   Abdominal:      General: Abdomen is flat. There is no distension.      Palpations: Abdomen is soft.      Tenderness: There is no abdominal tenderness. There is no right CVA tenderness, left CVA tenderness, guarding or rebound.   Musculoskeletal:         General: No deformity.      Cervical back: Neck supple. No tenderness. No muscular tenderness.   Lymphadenopathy:      Cervical: No cervical adenopathy.   Skin:     General: Skin is warm and dry.      Capillary Refill: Capillary refill takes less than 2 seconds.   Neurological:      Mental Status: He is alert.      Comments: Patient is speaking clearly in complete sentences.  Patient is answering appropriately and able follow commands.  Patient is moving all four extremities spontaneously.  No  facial droop.  Tongue midline.      Psychiatric:         Mood and Affect: Mood normal.         Behavior: Behavior normal.         Results Reviewed       Procedure Component Value Units Date/Time    Comprehensive metabolic panel [[id number]]  (Abnormal) Collected: 05/08/25 1233    Lab Status: Final result Specimen: Blood from Arm, Left Updated: 05/08/25 1258     Sodium 138 mmol/L      Potassium 4.1 mmol/L      Chloride 104 mmol/L      CO2 25 mmol/L      ANION GAP 9 mmol/L      BUN 19 mg/dL      Creatinine 0.91 mg/dL      Glucose 270 mg/dL      Calcium 8.7 mg/dL      AST 14 U/L      ALT 20 U/L      Alkaline Phosphatase 69 U/L      Total Protein 7.2 g/dL      Albumin 4.4 g/dL      Total Bilirubin 1.07 mg/dL      eGFR 97 ml/min/1.73sq m     Narrative:      National Kid[employer] Foundation guidelines for Chronic Kidney Disease (CKD):     Stage 1 with normal or high GFR (GFR > 90 mL/min/1.73 square meters)    Stage 2 Mild CKD (GFR = 60-89 mL/min/1.73 square meters)    Stage 3A Moderate CKD (GFR = 45-59 mL/min/1.73 square meters)    Stage 3B Moderate CKD (GFR = 30-44 mL/min/1.73 square meters)    Stage 4 Severe CKD (GFR = 15-29 mL/min/1.73 square meters)    Stage 5 End Stage CKD (GFR <15 mL/min/1.73 square meters)  Note: GFR calculation is accurate only with a steady state creatinine    CBC and differential [[id number]] Collected: 05/08/25 1233    Lab Status: Final result Specimen: Blood from Arm, Left Updated: 05/08/25 1246     WBC 4.77 Thousand/uL      RBC 5.41 Million/uL      Hemoglobin 16.0 g/dL      Hematocrit 45.5 %      MCV 84 fL      MCH 29.6 pg      MCHC 35.2 g/dL      RDW 12.5 %      MPV 9.0 fL      Platelets 189 Thousands/uL      nRBC 0 /100 WBCs      Segmented % 69 %      Immature Grans % 1 %      Lymphocytes % 19 %      Monocytes % 7 %      Eosinophils Relative 3 %      Basophils Relative 1 %      Absolute Neutrophils 3.34 Thousands/µL      Absolute Immature Grans 0.03 Thousand/uL      Absolute Lymphocytes  0.90 Thousands/µL      Absolute Monocytes 0.32 Thousand/µL      Eosinophils Absolute 0.12 Thousand/µL      Basophils Absolute 0.06 Thousands/µL             CT Orbits w contrast   Final Interpretation by Power Jackson MD ( 367)      Mild soft tissue swelling along the upper eyelid on the right. No evidence of postseptal extension to suggest orbital cellulitis.         Workstation performed: [id number]             Procedures    ED Medication and Procedure Management   Prior to Admission Medications   Prescriptions Last Dose Informant Patient Reported? Taking?   Continuous Glucose  (FreeStyle Jose 3 [place]) SARWAT   Yes No   Sig: USE 1 EACH 1 (ONE) TIME FOR 1 DOSE   Continuous Glucose Sensor (FreeStyle Jose 3 Sensor) MISC   No No   Sig: Use 1 each every 14 (fourteen) days   Dapagliflozin Pro-metFORMIN ER (Xigduo XR)  MG TB24   No No   Sig: TAKE 1 TABLET BY MOUTH EVERY DAY IN THE MORNING   Insulin Pen Needle (BD Pen Needle Nadiya U/F) 32G X 4 MM MISC   No No   Sig: Use daily as directed with insulin pen   atorvastatin (LIPITOR) 20 mg tablet   No No   Sig: TAKE 1 TABLET BY MOUTH EVERYDAY AT BEDTIME   fluticasone (FLONASE) 50 mcg/act nasal spray   No No   Si spray into each nostril daily   insulin degludec (Tresiba FlexTouch) 100 units/mL injection pen   No No   Sig: Inject 12 Units under the skin daily at bedtime   levothyroxine 200 mcg tablet   No No   Sig: TAKE 1 TABLET BY MOUTH EVERY DAY   levothyroxine 25 mcg tablet   No No   Sig: Take 1 tablet (25 mcg total) by mouth daily   losartan (COZAAR) 50 mg tablet   No No   Sig: Take 1 tablet (50 mg total) by mouth daily   sildenafil (VIAGRA) 100 mg tablet   No No   Sig: Take 1 tablet (100 mg total) by mouth daily as needed for erectile dysfunction      Facility-Administered Medications: None     Patient's Medications   Discharge Prescriptions    AMOXICILLIN-CLAVULANATE (AUGMENTIN) 875-125 MG PER TABLET    Take 1 tablet by mouth every 12 (twelve) hours  for 5 days       Start Date: 5/8/2025  End Date: 5/13/2025       Order Dose: 1 tablet       Quantity: 10 tablet    Refills: 0       ED SEPSIS DOCUMENTATION   Time reflects when diagnosis was documented in both MDM as applicable and the Disposition within this note       Time User Action Codes Description Comment    5/8/2025  1:01 PM Markell Andersen Add [L03.213] Preseptal cellulitis of right eye     5/8/2025  1:01 PM Markell Andersen Add [E11.65] Hyperglycemia due to diabetes mellitus (HCC)                  Markell Andersen MD  05/08/25 0093

## 2025-05-20 ENCOUNTER — RA CDI HCC (OUTPATIENT)
Dept: OTHER | Facility: HOSPITAL | Age: 50
End: 2025-05-20

## 2025-05-28 ENCOUNTER — OFFICE VISIT (OUTPATIENT)
Dept: FAMILY MEDICINE CLINIC | Facility: CLINIC | Age: 50
End: 2025-05-28
Payer: COMMERCIAL

## 2025-05-28 VITALS
DIASTOLIC BLOOD PRESSURE: 78 MMHG | HEART RATE: 102 BPM | WEIGHT: 204.8 LBS | RESPIRATION RATE: 16 BRPM | HEIGHT: 68 IN | BODY MASS INDEX: 31.04 KG/M2 | SYSTOLIC BLOOD PRESSURE: 128 MMHG | OXYGEN SATURATION: 94 % | TEMPERATURE: 99.4 F

## 2025-05-28 DIAGNOSIS — I10 ESSENTIAL HYPERTENSION: Primary | ICD-10-CM

## 2025-05-28 DIAGNOSIS — E11.65 UNCONTROLLED TYPE 2 DIABETES MELLITUS WITH HYPERGLYCEMIA (HCC): ICD-10-CM

## 2025-05-28 DIAGNOSIS — R05.3 CHRONIC COUGH: ICD-10-CM

## 2025-05-28 PROCEDURE — 99214 OFFICE O/P EST MOD 30 MIN: CPT | Performed by: FAMILY MEDICINE

## 2025-05-28 RX ORDER — INSULIN DEGLUDEC 100 U/ML
24 INJECTION, SOLUTION SUBCUTANEOUS
Qty: 15 ML | Refills: 1 | Status: SHIPPED | OUTPATIENT
Start: 2025-05-28

## 2025-05-28 NOTE — PROGRESS NOTES
Name: Stefano Hassan      : 1975      MRN: [id number]  Encounter Provider: Marcus Castano MD  Encounter Date: 2025   Encounter department: [facility]  :  Assessment & Plan  Uncontrolled type 2 diabetes mellitus with hyperglycemia (HCC)  His blood sugar measurements continue to be elevated I am going to increase his Tresiba to 24 units nightly.  He was told to monitor his sugar and if it continues to be more than 140 fasting in a.m. to increase his insulin 2 units every 2 days.  Lab Results   Component Value Date    HGBA1C 9.5 (H) 2025     Orders:  •  insulin degludec (Tresiba FlexTouch) 100 units/mL injection pen; Inject 24 Units under the skin daily at bedtime  •  Hemoglobin A1C; Future  •  CBC and differential; Future  •  Comprehensive metabolic panel; Future  •  TSH, 3rd generation with Free T4 reflex; Future  •  Lipid Panel with Direct LDL reflex; Future    Essential hypertension    - Well-controlled on losartan (Cozaar) 50 mg PO daily   - Plan f/u 1 month          Chronic cough  Improved since we stopped benazepril.  We will continue to monitor.              History of Present Illness   He is here today for follow-up multiple medical problems.  He was started on Tresiba 12 units at nightly and was told to increase his insulin if his fasting glucose continue to be elevated.  He stated he has been using 20 units nightly and his fasting glucose is down to 170-180.  He does not watch his diet.  He denies any episode of hypoglycemia.  His blood pressure has been fairly controlled.  He denies any complaint.    Diabetes  Pertinent negatives for hypoglycemia include no dizziness or headaches. Pertinent negatives for diabetes include no chest pain.     Review of Systems   Constitutional:  Negative for chills and fever.   HENT:  Negative for trouble swallowing.    Eyes:  Negative for visual disturbance.   Respiratory:  Negative for cough and shortness of breath.   "  Cardiovascular:  Negative for chest pain and palpitations.   Gastrointestinal:  Negative for abdominal pain, blood in stool and vomiting.   Endocrine: Negative for cold intolerance and heat intolerance.   Genitourinary:  Negative for difficulty urinating and dysuria.   Musculoskeletal:  Negative for gait problem.   Skin:  Negative for rash.   Neurological:  Negative for dizziness, syncope and headaches.   Hematological:  Negative for adenopathy.   Psychiatric/Behavioral:  Negative for behavioral problems.        Objective   /78 (BP Location: Left arm, Patient Position: Sitting, Cuff Size: Large)   Pulse 102   Temp 99.4 °F (37.4 °C) (Tympanic)   Resp 16   Ht 5' 8\" (1.727 m)   Wt 92.9 kg (204 lb 12.8 oz)   SpO2 94%   BMI 31.14 kg/m²      Physical Exam  Vitals and nursing note reviewed.   Constitutional:       Appearance: He is well-developed.   HENT:      Head: Normocephalic and atraumatic.     Eyes:      Pupils: Pupils are equal, round, and reactive to light.       Cardiovascular:      Rate and Rhythm: Normal rate and regular rhythm.      Heart sounds: Normal heart sounds.   Pulmonary:      Effort: Pulmonary effort is normal.      Breath sounds: Normal breath sounds.   Abdominal:      General: Bowel sounds are normal.      Palpations: Abdomen is soft.     Musculoskeletal:      Cervical back: Normal range of motion and neck supple.   Lymphadenopathy:      Cervical: No cervical adenopathy.     Skin:     General: Skin is warm.      Findings: No rash.     Neurological:      Mental Status: He is alert and oriented to person, place, and time.         "

## 2025-05-28 NOTE — ASSESSMENT & PLAN NOTE
His blood sugar measurements continue to be elevated I am going to increase his Tresiba to 24 units nightly.  He was told to monitor his sugar and if it continues to be more than 140 fasting in a.m. to increase his insulin 2 units every 2 days.  Lab Results   Component Value Date    HGBA1C 9.5 (H) 03/28/2025     Orders:  •  insulin degludec (Tresiba FlexTouch) 100 units/mL injection pen; Inject 24 Units under the skin daily at bedtime  •  Hemoglobin A1C; Future  •  CBC and differential; Future  •  Comprehensive metabolic panel; Future  •  TSH, 3rd generation with Free T4 reflex; Future  •  Lipid Panel with Direct LDL reflex; Future

## 2025-05-30 ENCOUNTER — PATIENT OUTREACH (OUTPATIENT)
Dept: CASE MANAGEMENT | Facility: OTHER | Age: 50
End: 2025-05-30

## 2025-05-30 DIAGNOSIS — E11.65 UNCONTROLLED TYPE 2 DIABETES MELLITUS WITH HYPERGLYCEMIA (HCC): Primary | ICD-10-CM

## 2025-05-30 NOTE — PROGRESS NOTES
Outpatient Care Management Note:  Referral for care management received due to elevated A1C.  Chart review completed.     History includes HTN, postoperative hypothyroidism, DM with recent A1C of 9.8, arthritis, anxiety, and hyperlipidemia.     5/28/2025 PCP visit- Tresiba increased to 24 units. If fasting glucose is greater than 140, he should increase by 2 units every 2 days. Admits to not watching his diet.  BP controlled at 128/78.      Outreach call scheduled.

## 2025-06-03 ENCOUNTER — PATIENT OUTREACH (OUTPATIENT)
Dept: CASE MANAGEMENT | Facility: OTHER | Age: 50
End: 2025-06-03

## 2025-06-03 DIAGNOSIS — E11.65 UNCONTROLLED TYPE 2 DIABETES MELLITUS WITH HYPERGLYCEMIA (HCC): Primary | ICD-10-CM

## 2025-06-03 RX ORDER — KETOROLAC TROMETHAMINE 30 MG/ML
INJECTION, SOLUTION INTRAMUSCULAR; INTRAVENOUS
Qty: 1 EACH | Refills: 1 | Status: SHIPPED | OUTPATIENT
Start: 2025-06-03

## 2025-06-03 NOTE — PROGRESS NOTES
Outpatient Care Management Note:  Outreach call placed to Mr. Hassan.  Introduced myself and my role.      Mr. Hassan stated he was at work, questioned if there was a better time to reach him and he stated he was very busy.  Call ended.

## 2025-06-25 ENCOUNTER — OFFICE VISIT (OUTPATIENT)
Dept: FAMILY MEDICINE CLINIC | Facility: CLINIC | Age: 50
End: 2025-06-25
Payer: COMMERCIAL

## 2025-06-25 VITALS
HEART RATE: 84 BPM | SYSTOLIC BLOOD PRESSURE: 130 MMHG | DIASTOLIC BLOOD PRESSURE: 86 MMHG | RESPIRATION RATE: 16 BRPM | BODY MASS INDEX: 30.25 KG/M2 | WEIGHT: 199.6 LBS | OXYGEN SATURATION: 98 % | TEMPERATURE: 97.7 F | HEIGHT: 68 IN

## 2025-06-25 DIAGNOSIS — E78.2 MIXED HYPERLIPIDEMIA: ICD-10-CM

## 2025-06-25 DIAGNOSIS — E11.65 UNCONTROLLED TYPE 2 DIABETES MELLITUS WITH HYPERGLYCEMIA (HCC): Primary | ICD-10-CM

## 2025-06-25 DIAGNOSIS — Z00.01 ABNORMAL WELLNESS EXAM: ICD-10-CM

## 2025-06-25 DIAGNOSIS — I10 ESSENTIAL HYPERTENSION: ICD-10-CM

## 2025-06-25 PROCEDURE — 99214 OFFICE O/P EST MOD 30 MIN: CPT | Performed by: FAMILY MEDICINE

## 2025-06-25 PROCEDURE — 99396 PREV VISIT EST AGE 40-64: CPT | Performed by: FAMILY MEDICINE

## 2025-06-25 RX ORDER — INSULIN DEGLUDEC 100 U/ML
30 INJECTION, SOLUTION SUBCUTANEOUS
Qty: 15 ML | Refills: 1 | Status: SHIPPED | OUTPATIENT
Start: 2025-06-25

## 2025-06-25 NOTE — ASSESSMENT & PLAN NOTE
- Well-controlled on losartan (Cozaar) 50 mg PO daily   - Plan f/u 1 month   Orders:  •  CBC and differential; Future  •  Comprehensive metabolic panel; Future  •  Lipid Panel with Direct LDL reflex; Future  •  TSH, 3rd generation with Free T4 reflex; Future

## 2025-06-25 NOTE — ASSESSMENT & PLAN NOTE
Not well-controlled.  Going to increase his Tresiba to 30 units nightly.  Continue on Xigduo.  Continue to monitor.  Lab Results   Component Value Date    HGBA1C 9.5 (H) 03/28/2025     Orders:  •  insulin degludec (Tresiba FlexTouch) 100 units/mL injection pen; Inject 30 Units under the skin daily at bedtime  •  Hemoglobin A1C; Future

## 2025-06-25 NOTE — PROGRESS NOTES
Name: Stefano Hassan      : 1975      MRN: [id number]  Encounter Provider: Marcus Castano MD  Encounter Date: 2025   Encounter department: [facility]  :  Assessment & Plan  Uncontrolled type 2 diabetes mellitus with hyperglycemia (HCC)  Not well-controlled.  Going to increase his Tresiba to 30 units nightly.  Continue on Xigduo.  Continue to monitor.  Lab Results   Component Value Date    HGBA1C 9.5 (H) 2025     Orders:  •  insulin degludec (Tresiba FlexTouch) 100 units/mL injection pen; Inject 30 Units under the skin daily at bedtime  •  Hemoglobin A1C; Future    Essential hypertension    - Well-controlled on losartan (Cozaar) 50 mg PO daily   - Plan f/u 1 month   Orders:  •  CBC and differential; Future  •  Comprehensive metabolic panel; Future  •  Lipid Panel with Direct LDL reflex; Future  •  TSH, 3rd generation with Free T4 reflex; Future      Abnormal wellness exam  It was discussed about immunizations, diet, exercise and safety measures.       Mixed hyperlipidemia  Slightly elevated triglyceride but LDL is stable on atorvastatin.  Continue same and continue to follow with low-fat diet and regular exercise.    Orders:  •  CBC and differential; Future  •  Comprehensive metabolic panel; Future  •  Lipid Panel with Direct LDL reflex; Future  •  TSH, 3rd generation with Free T4 reflex; Future           History of Present Illness   Is here today for follow-up multiple medical problems.  Has been taking medication.  He has a side effect.  He stated his fasting sugar has been in the high 180s in the morning.  It can go higher during the day.  He was using 24 units of Tresiba at bedtime.  He will continue on acceptable.  He denies any episodes of hypoglycemia.      Review of Systems   Constitutional:  Negative for chills and fever.   HENT:  Negative for trouble swallowing.    Eyes:  Negative for visual disturbance.   Respiratory:  Negative for cough and shortness of  "breath.    Cardiovascular:  Negative for chest pain and palpitations.   Gastrointestinal:  Negative for abdominal pain, blood in stool and vomiting.   Endocrine: Negative for cold intolerance and heat intolerance.   Genitourinary:  Negative for difficulty urinating and dysuria.   Musculoskeletal:  Negative for gait problem.   Skin:  Negative for rash.   Neurological:  Negative for dizziness, syncope and headaches.   Hematological:  Negative for adenopathy.   Psychiatric/Behavioral:  Negative for behavioral problems.        Objective   /86 (BP Location: Left arm, Patient Position: Sitting, Cuff Size: Large)   Pulse 84   Temp 97.7 °F (36.5 °C) (Tympanic)   Resp 16   Ht 5' 8\" (1.727 m)   Wt 90.5 kg (199 lb 9.6 oz)   SpO2 98%   BMI 30.35 kg/m²      Physical Exam  Vitals and nursing note reviewed.   Constitutional:       Appearance: He is well-developed.   HENT:      Head: Normocephalic and atraumatic.     Eyes:      Pupils: Pupils are equal, round, and reactive to light.       Cardiovascular:      Rate and Rhythm: Normal rate and regular rhythm.      Heart sounds: Normal heart sounds.   Pulmonary:      Effort: Pulmonary effort is normal.      Breath sounds: Normal breath sounds.   Abdominal:      General: Bowel sounds are normal.      Palpations: Abdomen is soft.     Musculoskeletal:      Cervical back: Normal range of motion and neck supple.   Lymphadenopathy:      Cervical: No cervical adenopathy.     Skin:     General: Skin is warm.      Findings: No rash.     Neurological:      Mental Status: He is alert and oriented to person, place, and time.         "

## 2025-06-25 NOTE — ASSESSMENT & PLAN NOTE
Slightly elevated triglyceride but LDL is stable on atorvastatin.  Continue same and continue to follow with low-fat diet and regular exercise.    Orders:  •  CBC and differential; Future  •  Comprehensive metabolic panel; Future  •  Lipid Panel with Direct LDL reflex; Future  •  TSH, 3rd generation with Free T4 reflex; Future

## 2025-07-23 ENCOUNTER — OFFICE VISIT (OUTPATIENT)
Dept: FAMILY MEDICINE CLINIC | Facility: CLINIC | Age: 50
End: 2025-07-23
Payer: COMMERCIAL

## 2025-07-23 VITALS
WEIGHT: 201 LBS | TEMPERATURE: 95.5 F | HEART RATE: 103 BPM | SYSTOLIC BLOOD PRESSURE: 130 MMHG | OXYGEN SATURATION: 99 % | RESPIRATION RATE: 16 BRPM | BODY MASS INDEX: 30.46 KG/M2 | HEIGHT: 68 IN | DIASTOLIC BLOOD PRESSURE: 90 MMHG

## 2025-07-23 DIAGNOSIS — I10 ESSENTIAL HYPERTENSION: ICD-10-CM

## 2025-07-23 DIAGNOSIS — E11.65 UNCONTROLLED TYPE 2 DIABETES MELLITUS WITH HYPERGLYCEMIA (HCC): ICD-10-CM

## 2025-07-23 PROCEDURE — 99213 OFFICE O/P EST LOW 20 MIN: CPT | Performed by: FAMILY MEDICINE

## 2025-07-23 RX ORDER — INSULIN DEGLUDEC 100 U/ML
35 INJECTION, SOLUTION SUBCUTANEOUS
Qty: 15 ML | Refills: 1 | Status: SHIPPED | OUTPATIENT
Start: 2025-07-23

## 2025-07-23 NOTE — ASSESSMENT & PLAN NOTE
Not well-controlled.  I am going to increase his Tresiba to 35 units nightly.  Continue Ondich Xigduo and was discussed with patient about low-carb diet and regular exercise.  Come back in 1 month for follow-up.  Lab Results   Component Value Date    HGBA1C 9.5 (H) 03/28/2025     Orders:  •  insulin degludec (Tresiba FlexTouch) 100 units/mL injection pen; Inject 35 Units under the skin daily at bedtime

## 2025-07-23 NOTE — PROGRESS NOTES
Name: Stefano Hassan      : 1975      MRN: [id number]  Encounter Provider: Marcus Castano MD  Encounter Date: 2025   Encounter department: [facility]  :  Assessment & Plan  Uncontrolled type 2 diabetes mellitus with hyperglycemia (HCC)  Not well-controlled.  I am going to increase his Tresiba to 35 units nightly.  Continue Ondich Xigduo and was discussed with patient about low-carb diet and regular exercise.  Come back in 1 month for follow-up.  Lab Results   Component Value Date    HGBA1C 9.5 (H) 2025     Orders:  •  insulin degludec (Tresiba FlexTouch) 100 units/mL injection pen; Inject 35 Units under the skin daily at bedtime           History of Present Illness    he is here today for follow-up for diabetes mellitus type 2 not well controlled.  His insulin was increased to 30 units daily and he continued to have higher measurement of fasting glucose.  He stated he does not watch his diet very well.  This morning he had high measurement in the morning in the 200s.  He stated the lowest he had was 120.    Diabetes  Pertinent negatives for hypoglycemia include no dizziness or headaches. Pertinent negatives for diabetes include no chest pain.   Hypertension  Pertinent negatives include no chest pain, headaches, palpitations or shortness of breath.     Review of Systems   Constitutional:  Negative for chills and fever.   HENT:  Negative for trouble swallowing.    Eyes:  Negative for visual disturbance.   Respiratory:  Negative for cough and shortness of breath.    Cardiovascular:  Negative for chest pain and palpitations.   Gastrointestinal:  Negative for abdominal pain, blood in stool and vomiting.   Endocrine: Negative for cold intolerance and heat intolerance.   Genitourinary:  Negative for difficulty urinating and dysuria.   Musculoskeletal:  Negative for gait problem.   Skin:  Negative for rash.   Neurological:  Negative for dizziness, syncope and headaches.  "  Hematological:  Negative for adenopathy.   Psychiatric/Behavioral:  Negative for behavioral problems.        Objective   /90 (BP Location: Right arm, Patient Position: Sitting, Cuff Size: Standard)   Pulse 103   Temp (!) 95.5 °F (35.3 °C) (Tympanic)   Resp 16   Ht 5' 8\" (1.727 m)   Wt 91.2 kg (201 lb)   SpO2 99%   BMI 30.56 kg/m²      Physical Exam  Vitals and nursing note reviewed.   Constitutional:       Appearance: He is well-developed.   HENT:      Head: Normocephalic and atraumatic.     Eyes:      Pupils: Pupils are equal, round, and reactive to light.       Cardiovascular:      Rate and Rhythm: Normal rate and regular rhythm.      Heart sounds: Normal heart sounds.   Pulmonary:      Effort: Pulmonary effort is normal.      Breath sounds: Normal breath sounds.   Abdominal:      General: Bowel sounds are normal.      Palpations: Abdomen is soft.     Musculoskeletal:      Cervical back: Normal range of motion and neck supple.   Lymphadenopathy:      Cervical: No cervical adenopathy.     Skin:     General: Skin is warm.      Findings: No rash.     Neurological:      Mental Status: He is alert and oriented to person, place, and time.         "

## 2025-07-24 RX ORDER — LOSARTAN POTASSIUM 50 MG/1
50 TABLET ORAL DAILY
Qty: 30 TABLET | Refills: 5 | Status: SHIPPED | OUTPATIENT
Start: 2025-07-24

## 2025-08-22 LAB
ALBUMIN SERPL BCG-MCNC: 4.3 G/DL (ref 3.5–5)
ALP SERPL-CCNC: 65 U/L (ref 34–104)
ALT SERPL W P-5'-P-CCNC: 20 U/L (ref 7–52)
ANION GAP SERPL CALCULATED.3IONS-SCNC: 3 MMOL/L (ref 4–13)
AST SERPL W P-5'-P-CCNC: 13 U/L (ref 13–39)
BASOPHILS # BLD AUTO: 0.06 THOUSANDS/ÂΜL (ref 0–0.1)
BASOPHILS NFR BLD AUTO: 1 % (ref 0–1)
BILIRUB SERPL-MCNC: 0.88 MG/DL (ref 0.2–1)
BUN SERPL-MCNC: 15 MG/DL (ref 5–25)
CALCIUM SERPL-MCNC: 8.5 MG/DL (ref 8.4–10.2)
CHLORIDE SERPL-SCNC: 105 MMOL/L (ref 96–108)
CHOLEST SERPL-MCNC: 126 MG/DL (ref ?–200)
CO2 SERPL-SCNC: 31 MMOL/L (ref 21–32)
CREAT SERPL-MCNC: 0.86 MG/DL (ref 0.6–1.3)
EOSINOPHIL # BLD AUTO: 0.12 THOUSAND/ÂΜL (ref 0–0.61)
EOSINOPHIL NFR BLD AUTO: 3 % (ref 0–6)
ERYTHROCYTE [DISTWIDTH] IN BLOOD BY AUTOMATED COUNT: 13 % (ref 11.6–15.1)
EST. AVERAGE GLUCOSE BLD GHB EST-MCNC: 217 MG/DL
GFR SERPL CREATININE-BSD FRML MDRD: 100 ML/MIN/1.73SQ M
GLUCOSE P FAST SERPL-MCNC: 202 MG/DL (ref 65–99)
HBA1C MFR BLD: 9.2 %
HCT VFR BLD AUTO: 45.1 % (ref 36.5–49.3)
HDLC SERPL-MCNC: 33 MG/DL
HGB BLD-MCNC: 15.7 G/DL (ref 12–17)
IMM GRANULOCYTES # BLD AUTO: 0.04 THOUSAND/UL (ref 0–0.2)
IMM GRANULOCYTES NFR BLD AUTO: 1 % (ref 0–2)
LDLC SERPL CALC-MCNC: 77 MG/DL (ref 0–100)
LYMPHOCYTES # BLD AUTO: 1.11 THOUSANDS/ÂΜL (ref 0.6–4.47)
LYMPHOCYTES NFR BLD AUTO: 25 % (ref 14–44)
MCH RBC QN AUTO: 29.3 PG (ref 26.8–34.3)
MCHC RBC AUTO-ENTMCNC: 34.8 G/DL (ref 31.4–37.4)
MCV RBC AUTO: 84 FL (ref 82–98)
MONOCYTES # BLD AUTO: 0.38 THOUSAND/ÂΜL (ref 0.17–1.22)
MONOCYTES NFR BLD AUTO: 9 % (ref 4–12)
NEUTROPHILS # BLD AUTO: 2.74 THOUSANDS/ÂΜL (ref 1.85–7.62)
NEUTS SEG NFR BLD AUTO: 61 % (ref 43–75)
NRBC BLD AUTO-RTO: 0 /100 WBCS
PLATELET # BLD AUTO: 158 THOUSANDS/UL (ref 149–390)
PMV BLD AUTO: 9.4 FL (ref 8.9–12.7)
POTASSIUM SERPL-SCNC: 3.9 MMOL/L (ref 3.5–5.3)
PROT SERPL-MCNC: 7.2 G/DL (ref 6.4–8.4)
RBC # BLD AUTO: 5.35 MILLION/UL (ref 3.88–5.62)
SODIUM SERPL-SCNC: 139 MMOL/L (ref 135–147)
TRIGL SERPL-MCNC: 80 MG/DL (ref ?–150)
TSH SERPL DL<=0.05 MIU/L-ACNC: 1.53 UIU/ML (ref 0.45–4.5)
WBC # BLD AUTO: 4.45 THOUSAND/UL (ref 4.31–10.16)

## (undated) DEVICE — SCD SEQUENTIAL COMPRESSION COMFORT SLEEVE MEDIUM KNEE LENGTH: Brand: KENDALL SCD

## (undated) DEVICE — ELECTRODE LAP J HOOK E-Z CLEAN 33CM-0021

## (undated) DEVICE — SUT MONOCRYL 4-0 PS-2 27 IN Y426H

## (undated) DEVICE — TROCAR: Brand: KII FIOS FIRST ENTRY

## (undated) DEVICE — STERILE POLYISOPRENE POWDER-FREE SURGICAL GLOVES: Brand: PROTEXIS

## (undated) DEVICE — PENCIL ELECTROSURG E-Z CLEAN -0035H

## (undated) DEVICE — TROCAR: Brand: KII® SLEEVE

## (undated) DEVICE — TUBING SET W. FILTER, GAS FLOW 30 L/MIN: Brand: N.A.

## (undated) DEVICE — LIGAMAX 5 MM ENDOSCOPIC MULTIPLE CLIP APPLIER: Brand: LIGAMAX

## (undated) DEVICE — DISPOSABLE OR TOWEL: Brand: CARDINAL HEALTH

## (undated) DEVICE — SWABSTCK, BENZOIN TINCTURE, 1/PK, STRL: Brand: APLICARE

## (undated) DEVICE — INTENDED FOR TISSUE SEPARATION, AND OTHER PROCEDURES THAT REQUIRE A SHARP SURGICAL BLADE TO PUNCTURE OR CUT.: Brand: BARD-PARKER SAFETY BLADES SIZE 11, STERILE

## (undated) DEVICE — 3M™ STERI-STRIP™ REINFORCED ADHESIVE SKIN CLOSURES, R1547, 1/2 IN X 4 IN (12 MM X 100 MM), 6 STRIPS/ENVELOPE: Brand: 3M™ STERI-STRIP™

## (undated) DEVICE — SUT VICRYL 0 UR-6 27 IN J603H

## (undated) DEVICE — ALLENTOWN LAP CHOLE APP PACK: Brand: CARDINAL HEALTH

## (undated) DEVICE — CHLORAPREP HI-LITE 26ML ORANGE

## (undated) DEVICE — ENDOPATH PNEUMONEEDLE INSUFFLATION NEEDLES WITH LUER LOCK CONNECTORS 120MM: Brand: ENDOPATH

## (undated) DEVICE — ENDOPATH 5MM CURVED SCISSORS WITH MONOPOLAR CAUTERY: Brand: ENDOPATH

## (undated) DEVICE — NEEDLE BLUNT 18 G X 1 1/2IN

## (undated) DEVICE — SYRINGE 30ML LL

## (undated) DEVICE — STANDARD SURGICAL GOWN, L: Brand: CONVERTORS

## (undated) DEVICE — SINGLE PORT MANIFOLD: Brand: NEPTUNE 2